# Patient Record
Sex: MALE | Race: WHITE | NOT HISPANIC OR LATINO | Employment: UNEMPLOYED
[De-identification: names, ages, dates, MRNs, and addresses within clinical notes are randomized per-mention and may not be internally consistent; named-entity substitution may affect disease eponyms.]

---

## 2020-02-16 ENCOUNTER — HEALTH MAINTENANCE LETTER (OUTPATIENT)
Age: 35
End: 2020-02-16

## 2021-03-04 ENCOUNTER — TRANSFERRED RECORDS (OUTPATIENT)
Dept: HEALTH INFORMATION MANAGEMENT | Facility: CLINIC | Age: 36
End: 2021-03-04

## 2021-03-05 ENCOUNTER — HOSPITAL ENCOUNTER (INPATIENT)
Facility: CLINIC | Age: 36
LOS: 4 days | Discharge: PSYCHIATRIC OR SUBSTANCE USE FACILITY | End: 2021-03-09
Attending: EMERGENCY MEDICINE | Admitting: PSYCHIATRY & NEUROLOGY
Payer: COMMERCIAL

## 2021-03-05 ENCOUNTER — TELEPHONE (OUTPATIENT)
Dept: BEHAVIORAL HEALTH | Facility: CLINIC | Age: 36
End: 2021-03-05

## 2021-03-05 DIAGNOSIS — Z11.52 ENCOUNTER FOR SCREENING LABORATORY TESTING FOR SEVERE ACUTE RESPIRATORY SYNDROME CORONAVIRUS 2 (SARS-COV-2): ICD-10-CM

## 2021-03-05 DIAGNOSIS — F10.930 ALCOHOL WITHDRAWAL SYNDROME WITHOUT COMPLICATION (H): ICD-10-CM

## 2021-03-05 LAB
ALBUMIN SERPL-MCNC: 4.5 G/DL (ref 3.4–5)
ALCOHOL BREATH TEST: 0 (ref 0–0.01)
ALP SERPL-CCNC: 88 U/L (ref 40–150)
ALT SERPL W P-5'-P-CCNC: 265 U/L (ref 0–70)
AMPHETAMINES UR QL SCN: NEGATIVE
ANION GAP SERPL CALCULATED.3IONS-SCNC: 6 MMOL/L (ref 3–14)
AST SERPL W P-5'-P-CCNC: 248 U/L (ref 0–45)
BARBITURATES UR QL: NEGATIVE
BASOPHILS # BLD AUTO: 0.1 10E9/L (ref 0–0.2)
BASOPHILS NFR BLD AUTO: 1.8 %
BENZODIAZ UR QL: POSITIVE
BILIRUB SERPL-MCNC: 0.9 MG/DL (ref 0.2–1.3)
BUN SERPL-MCNC: 24 MG/DL (ref 7–30)
CALCIUM SERPL-MCNC: 10.7 MG/DL (ref 8.5–10.1)
CANNABINOIDS UR QL SCN: POSITIVE
CHLORIDE SERPL-SCNC: 102 MMOL/L (ref 94–109)
CO2 SERPL-SCNC: 33 MMOL/L (ref 20–32)
COCAINE UR QL: NEGATIVE
CREAT SERPL-MCNC: 0.75 MG/DL (ref 0.66–1.25)
DIFFERENTIAL METHOD BLD: ABNORMAL
EOSINOPHIL # BLD AUTO: 0.1 10E9/L (ref 0–0.7)
EOSINOPHIL NFR BLD AUTO: 2.1 %
ERYTHROCYTE [DISTWIDTH] IN BLOOD BY AUTOMATED COUNT: 11.9 % (ref 10–15)
ETHANOL UR QL SCN: NEGATIVE
GFR SERPL CREATININE-BSD FRML MDRD: >90 ML/MIN/{1.73_M2}
GGT SERPL-CCNC: 365 U/L (ref 0–75)
GLUCOSE SERPL-MCNC: 97 MG/DL (ref 70–99)
HCT VFR BLD AUTO: 44 % (ref 40–53)
HGB BLD-MCNC: 15.4 G/DL (ref 13.3–17.7)
IMM GRANULOCYTES # BLD: 0 10E9/L (ref 0–0.4)
IMM GRANULOCYTES NFR BLD: 0.2 %
LABORATORY COMMENT REPORT: NORMAL
LYMPHOCYTES # BLD AUTO: 1.7 10E9/L (ref 0.8–5.3)
LYMPHOCYTES NFR BLD AUTO: 30.2 %
MCH RBC QN AUTO: 34.8 PG (ref 26.5–33)
MCHC RBC AUTO-ENTMCNC: 35 G/DL (ref 31.5–36.5)
MCV RBC AUTO: 100 FL (ref 78–100)
MONOCYTES # BLD AUTO: 0.5 10E9/L (ref 0–1.3)
MONOCYTES NFR BLD AUTO: 9.6 %
NEUTROPHILS # BLD AUTO: 3.1 10E9/L (ref 1.6–8.3)
NEUTROPHILS NFR BLD AUTO: 56.1 %
NRBC # BLD AUTO: 0 10*3/UL
NRBC BLD AUTO-RTO: 0 /100
OPIATES UR QL SCN: NEGATIVE
PLATELET # BLD AUTO: 166 10E9/L (ref 150–450)
POTASSIUM SERPL-SCNC: 3.5 MMOL/L (ref 3.4–5.3)
PROT SERPL-MCNC: 7.9 G/DL (ref 6.8–8.8)
RBC # BLD AUTO: 4.42 10E12/L (ref 4.4–5.9)
SARS-COV-2 RNA RESP QL NAA+PROBE: NEGATIVE
SODIUM SERPL-SCNC: 141 MMOL/L (ref 133–144)
SPECIMEN SOURCE: NORMAL
TSH SERPL DL<=0.005 MIU/L-ACNC: 2.09 MU/L (ref 0.4–4)
WBC # BLD AUTO: 5.6 10E9/L (ref 4–11)

## 2021-03-05 PROCEDURE — 82607 VITAMIN B-12: CPT | Performed by: EMERGENCY MEDICINE

## 2021-03-05 PROCEDURE — 84443 ASSAY THYROID STIM HORMONE: CPT | Performed by: EMERGENCY MEDICINE

## 2021-03-05 PROCEDURE — 128N000004 HC R&B CD ADULT

## 2021-03-05 PROCEDURE — 250N000011 HC RX IP 250 OP 636: Performed by: EMERGENCY MEDICINE

## 2021-03-05 PROCEDURE — 80053 COMPREHEN METABOLIC PANEL: CPT | Performed by: EMERGENCY MEDICINE

## 2021-03-05 PROCEDURE — 250N000013 HC RX MED GY IP 250 OP 250 PS 637: Performed by: EMERGENCY MEDICINE

## 2021-03-05 PROCEDURE — 99285 EMERGENCY DEPT VISIT HI MDM: CPT | Performed by: EMERGENCY MEDICINE

## 2021-03-05 PROCEDURE — 80320 DRUG SCREEN QUANTALCOHOLS: CPT | Performed by: EMERGENCY MEDICINE

## 2021-03-05 PROCEDURE — 82075 ASSAY OF BREATH ETHANOL: CPT | Performed by: EMERGENCY MEDICINE

## 2021-03-05 PROCEDURE — 85025 COMPLETE CBC W/AUTO DIFF WBC: CPT | Performed by: EMERGENCY MEDICINE

## 2021-03-05 PROCEDURE — 82977 ASSAY OF GGT: CPT | Performed by: EMERGENCY MEDICINE

## 2021-03-05 PROCEDURE — 99284 EMERGENCY DEPT VISIT MOD MDM: CPT | Performed by: EMERGENCY MEDICINE

## 2021-03-05 PROCEDURE — 250N000013 HC RX MED GY IP 250 OP 250 PS 637: Performed by: PSYCHIATRY & NEUROLOGY

## 2021-03-05 PROCEDURE — HZ2ZZZZ DETOXIFICATION SERVICES FOR SUBSTANCE ABUSE TREATMENT: ICD-10-PCS | Performed by: PSYCHIATRY & NEUROLOGY

## 2021-03-05 PROCEDURE — 80307 DRUG TEST PRSMV CHEM ANLYZR: CPT | Performed by: EMERGENCY MEDICINE

## 2021-03-05 PROCEDURE — 87635 SARS-COV-2 COVID-19 AMP PRB: CPT | Performed by: EMERGENCY MEDICINE

## 2021-03-05 PROCEDURE — 82306 VITAMIN D 25 HYDROXY: CPT | Performed by: EMERGENCY MEDICINE

## 2021-03-05 PROCEDURE — C9803 HOPD COVID-19 SPEC COLLECT: HCPCS | Performed by: EMERGENCY MEDICINE

## 2021-03-05 RX ORDER — CLONIDINE HYDROCHLORIDE 0.1 MG/1
0.1 TABLET ORAL 2 TIMES DAILY PRN
Status: DISCONTINUED | OUTPATIENT
Start: 2021-03-05 | End: 2021-03-09 | Stop reason: HOSPADM

## 2021-03-05 RX ORDER — FOLIC ACID 1 MG/1
1 TABLET ORAL DAILY
Status: DISCONTINUED | OUTPATIENT
Start: 2021-03-06 | End: 2021-03-05

## 2021-03-05 RX ORDER — MAGNESIUM HYDROXIDE/ALUMINUM HYDROXICE/SIMETHICONE 120; 1200; 1200 MG/30ML; MG/30ML; MG/30ML
30 SUSPENSION ORAL EVERY 4 HOURS PRN
Status: DISCONTINUED | OUTPATIENT
Start: 2021-03-05 | End: 2021-03-09 | Stop reason: HOSPADM

## 2021-03-05 RX ORDER — MULTIVITAMIN,THERAPEUTIC
1 TABLET ORAL DAILY
Status: ON HOLD | COMMUNITY
End: 2021-03-09

## 2021-03-05 RX ORDER — MULTIPLE VITAMINS W/ MINERALS TAB 9MG-400MCG
1 TAB ORAL DAILY
Status: DISCONTINUED | OUTPATIENT
Start: 2021-03-05 | End: 2021-03-09 | Stop reason: HOSPADM

## 2021-03-05 RX ORDER — ONDANSETRON 4 MG/1
4 TABLET, ORALLY DISINTEGRATING ORAL ONCE
Status: COMPLETED | OUTPATIENT
Start: 2021-03-05 | End: 2021-03-05

## 2021-03-05 RX ORDER — TRAZODONE HYDROCHLORIDE 50 MG/1
50 TABLET, FILM COATED ORAL
Status: DISCONTINUED | OUTPATIENT
Start: 2021-03-05 | End: 2021-03-09

## 2021-03-05 RX ORDER — ONDANSETRON 4 MG/1
4 TABLET, ORALLY DISINTEGRATING ORAL EVERY 6 HOURS PRN
Status: DISCONTINUED | OUTPATIENT
Start: 2021-03-05 | End: 2021-03-09 | Stop reason: HOSPADM

## 2021-03-05 RX ORDER — ATENOLOL 50 MG/1
50 TABLET ORAL DAILY PRN
Status: DISCONTINUED | OUTPATIENT
Start: 2021-03-05 | End: 2021-03-09 | Stop reason: HOSPADM

## 2021-03-05 RX ORDER — AMOXICILLIN 250 MG
1 CAPSULE ORAL 2 TIMES DAILY PRN
Status: DISCONTINUED | OUTPATIENT
Start: 2021-03-05 | End: 2021-03-09 | Stop reason: HOSPADM

## 2021-03-05 RX ORDER — LANOLIN ALCOHOL/MO/W.PET/CERES
100 CREAM (GRAM) TOPICAL DAILY
Status: DISCONTINUED | OUTPATIENT
Start: 2021-03-05 | End: 2021-03-09 | Stop reason: HOSPADM

## 2021-03-05 RX ORDER — DIAZEPAM 5 MG
5-20 TABLET ORAL EVERY 30 MIN PRN
Status: DISCONTINUED | OUTPATIENT
Start: 2021-03-05 | End: 2021-03-05 | Stop reason: CLARIF

## 2021-03-05 RX ORDER — FOLIC ACID 1 MG/1
1 TABLET ORAL DAILY
Status: DISCONTINUED | OUTPATIENT
Start: 2021-03-05 | End: 2021-03-09 | Stop reason: HOSPADM

## 2021-03-05 RX ORDER — OLANZAPINE 10 MG/1
10 TABLET ORAL 3 TIMES DAILY PRN
Status: DISCONTINUED | OUTPATIENT
Start: 2021-03-05 | End: 2021-03-09 | Stop reason: HOSPADM

## 2021-03-05 RX ORDER — HYDROXYZINE HYDROCHLORIDE 25 MG/1
25 TABLET, FILM COATED ORAL EVERY 4 HOURS PRN
Status: DISCONTINUED | OUTPATIENT
Start: 2021-03-05 | End: 2021-03-09 | Stop reason: HOSPADM

## 2021-03-05 RX ORDER — ACETAMINOPHEN 325 MG/1
650 TABLET ORAL EVERY 4 HOURS PRN
Status: DISCONTINUED | OUTPATIENT
Start: 2021-03-05 | End: 2021-03-09 | Stop reason: HOSPADM

## 2021-03-05 RX ORDER — NICOTINE 21 MG/24HR
1 PATCH, TRANSDERMAL 24 HOURS TRANSDERMAL DAILY
Status: DISCONTINUED | OUTPATIENT
Start: 2021-03-05 | End: 2021-03-09 | Stop reason: HOSPADM

## 2021-03-05 RX ORDER — LANOLIN ALCOHOL/MO/W.PET/CERES
100 CREAM (GRAM) TOPICAL DAILY
Status: DISCONTINUED | OUTPATIENT
Start: 2021-03-06 | End: 2021-03-05

## 2021-03-05 RX ORDER — LORAZEPAM 1 MG/1
1-4 TABLET ORAL EVERY 30 MIN PRN
Status: DISCONTINUED | OUTPATIENT
Start: 2021-03-05 | End: 2021-03-06

## 2021-03-05 RX ORDER — METHADONE HYDROCHLORIDE 5 MG/5ML
55 SOLUTION ORAL DAILY
COMMUNITY

## 2021-03-05 RX ORDER — IBUPROFEN 200 MG
800 TABLET ORAL EVERY 8 HOURS PRN
Status: ON HOLD | COMMUNITY
End: 2021-03-09

## 2021-03-05 RX ORDER — MULTIPLE VITAMINS W/ MINERALS TAB 9MG-400MCG
1 TAB ORAL DAILY
Status: DISCONTINUED | OUTPATIENT
Start: 2021-03-06 | End: 2021-03-05

## 2021-03-05 RX ORDER — OLANZAPINE 10 MG/2ML
10 INJECTION, POWDER, FOR SOLUTION INTRAMUSCULAR 3 TIMES DAILY PRN
Status: DISCONTINUED | OUTPATIENT
Start: 2021-03-05 | End: 2021-03-09 | Stop reason: HOSPADM

## 2021-03-05 RX ORDER — DIAZEPAM 10 MG
10 TABLET ORAL ONCE
Status: COMPLETED | OUTPATIENT
Start: 2021-03-05 | End: 2021-03-05

## 2021-03-05 RX ADMIN — NICOTINE POLACRILEX 4 MG: 4 GUM, CHEWING BUCCAL at 15:30

## 2021-03-05 RX ADMIN — DIAZEPAM 10 MG: 10 TABLET ORAL at 17:59

## 2021-03-05 RX ADMIN — DIAZEPAM 10 MG: 5 TABLET ORAL at 20:17

## 2021-03-05 RX ADMIN — NICOTINE POLACRILEX 4 MG: 4 GUM, CHEWING BUCCAL at 20:14

## 2021-03-05 RX ADMIN — MULTIPLE VITAMINS W/ MINERALS TAB 1 TABLET: TAB at 15:32

## 2021-03-05 RX ADMIN — NICOTINE 1 PATCH: 21 PATCH, EXTENDED RELEASE TRANSDERMAL at 20:13

## 2021-03-05 RX ADMIN — HYDROXYZINE HYDROCHLORIDE 25 MG: 25 TABLET, FILM COATED ORAL at 22:15

## 2021-03-05 RX ADMIN — THIAMINE HCL TAB 100 MG 100 MG: 100 TAB at 15:33

## 2021-03-05 RX ADMIN — FOLIC ACID 1 MG: 1 TABLET ORAL at 15:33

## 2021-03-05 RX ADMIN — LORAZEPAM 2 MG: 1 TABLET ORAL at 22:15

## 2021-03-05 RX ADMIN — ONDANSETRON 4 MG: 4 TABLET, ORALLY DISINTEGRATING ORAL at 15:30

## 2021-03-05 RX ADMIN — TRAZODONE HYDROCHLORIDE 50 MG: 50 TABLET ORAL at 22:15

## 2021-03-05 ASSESSMENT — ENCOUNTER SYMPTOMS
TREMORS: 1
ABDOMINAL PAIN: 0
SHORTNESS OF BREATH: 0
HEADACHES: 1
FEVER: 0
NAUSEA: 1

## 2021-03-05 ASSESSMENT — ACTIVITIES OF DAILY LIVING (ADL): ADL_ASSESSMENT: WDL
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
## 2021-03-05 NOTE — ED TRIAGE NOTES
Pt states that he went to treatment a few months ago but wasn't given medical help for his withdrawal sx. Pt left that program and tried to manage his drinking on his own. Pt says he drinks a 1.75 of whiskey a day. Last drink was Wednesday at 2100.

## 2021-03-05 NOTE — TELEPHONE ENCOUNTER
Patient cleared and ready for behavioral bed placement: Yes   S:  Call received from MD in [place] ED requesting inpatient detox admission.    B:  35 year old male with a PMH notable for polysubstance abuse, anxiety, depression, BPD, PTSD who presents for alcohol detox. Patient reports that he has been drinking a gallon of whisky daily for 5 months. He reports that he went to treatment in [place] a month ago, but was not medically detoxed prior to being admitted. He states that they would not help him medically detox so he called his friend for a ride and left. He began drinking again immediately. His last drink was 9:30 pm Wednesday. He had marijuana this morning, but denies other drugs. He is now tremulous, nauseous, and has a headache and some visual disturbances. He has a remote history of withdrawal seizure in the past. He states that he has a Rule 25 set up to go to [facility] for treatment after detox. He denies SI and SIB.  Labs pending.  A: Voluntary

## 2021-03-06 LAB — VIT B12 SERPL-MCNC: 1110 PG/ML (ref 193–986)

## 2021-03-06 PROCEDURE — 99221 1ST HOSP IP/OBS SF/LOW 40: CPT | Performed by: PHYSICIAN ASSISTANT

## 2021-03-06 PROCEDURE — H2035 A/D TX PROGRAM, PER HOUR: HCPCS | Mod: HQ

## 2021-03-06 PROCEDURE — 99207 PR CONSULT E&M CHANGED TO INITIAL LEVEL: CPT | Performed by: PHYSICIAN ASSISTANT

## 2021-03-06 PROCEDURE — 250N000013 HC RX MED GY IP 250 OP 250 PS 637: Performed by: EMERGENCY MEDICINE

## 2021-03-06 PROCEDURE — 99223 1ST HOSP IP/OBS HIGH 75: CPT | Mod: AI | Performed by: PSYCHIATRY & NEUROLOGY

## 2021-03-06 PROCEDURE — 128N000004 HC R&B CD ADULT

## 2021-03-06 PROCEDURE — 250N000013 HC RX MED GY IP 250 OP 250 PS 637: Performed by: PSYCHIATRY & NEUROLOGY

## 2021-03-06 RX ORDER — NALOXONE HYDROCHLORIDE 0.4 MG/ML
0.4 INJECTION, SOLUTION INTRAMUSCULAR; INTRAVENOUS; SUBCUTANEOUS
Status: DISCONTINUED | OUTPATIENT
Start: 2021-03-06 | End: 2021-03-09 | Stop reason: HOSPADM

## 2021-03-06 RX ORDER — METHADONE HYDROCHLORIDE 5 MG/5ML
110 SOLUTION ORAL DAILY
Status: DISCONTINUED | OUTPATIENT
Start: 2021-03-06 | End: 2021-03-09 | Stop reason: HOSPADM

## 2021-03-06 RX ORDER — NALOXONE HYDROCHLORIDE 0.4 MG/ML
0.2 INJECTION, SOLUTION INTRAMUSCULAR; INTRAVENOUS; SUBCUTANEOUS
Status: DISCONTINUED | OUTPATIENT
Start: 2021-03-06 | End: 2021-03-09 | Stop reason: HOSPADM

## 2021-03-06 RX ORDER — DIAZEPAM 5 MG
5-20 TABLET ORAL EVERY 30 MIN PRN
Status: DISCONTINUED | OUTPATIENT
Start: 2021-03-06 | End: 2021-03-09 | Stop reason: HOSPADM

## 2021-03-06 RX ADMIN — MULTIPLE VITAMINS W/ MINERALS TAB 1 TABLET: TAB at 08:53

## 2021-03-06 RX ADMIN — DIAZEPAM 5 MG: 5 TABLET ORAL at 16:19

## 2021-03-06 RX ADMIN — NICOTINE 1 PATCH: 21 PATCH, EXTENDED RELEASE TRANSDERMAL at 08:53

## 2021-03-06 RX ADMIN — NICOTINE POLACRILEX 4 MG: 4 GUM, CHEWING BUCCAL at 18:25

## 2021-03-06 RX ADMIN — FOLIC ACID 1 MG: 1 TABLET ORAL at 08:53

## 2021-03-06 RX ADMIN — NICOTINE POLACRILEX 4 MG: 4 GUM, CHEWING BUCCAL at 12:49

## 2021-03-06 RX ADMIN — CLONIDINE HYDROCHLORIDE 0.1 MG: 0.1 TABLET ORAL at 09:43

## 2021-03-06 RX ADMIN — LORAZEPAM 1 MG: 1 TABLET ORAL at 08:53

## 2021-03-06 RX ADMIN — HYDROXYZINE HYDROCHLORIDE 25 MG: 25 TABLET, FILM COATED ORAL at 20:47

## 2021-03-06 RX ADMIN — NICOTINE POLACRILEX 4 MG: 4 GUM, CHEWING BUCCAL at 08:53

## 2021-03-06 RX ADMIN — NICOTINE POLACRILEX 4 MG: 4 GUM, CHEWING BUCCAL at 20:47

## 2021-03-06 RX ADMIN — OLANZAPINE 10 MG: 10 TABLET, FILM COATED ORAL at 21:55

## 2021-03-06 RX ADMIN — METHADONE HYDROCHLORIDE 110 MG: 5 SOLUTION ORAL at 09:42

## 2021-03-06 RX ADMIN — NICOTINE POLACRILEX 4 MG: 4 GUM, CHEWING BUCCAL at 16:19

## 2021-03-06 RX ADMIN — LORAZEPAM 2 MG: 1 TABLET ORAL at 00:53

## 2021-03-06 RX ADMIN — TRAZODONE HYDROCHLORIDE 50 MG: 50 TABLET ORAL at 21:55

## 2021-03-06 RX ADMIN — LORAZEPAM 2 MG: 1 TABLET ORAL at 05:13

## 2021-03-06 RX ADMIN — THIAMINE HCL TAB 100 MG 100 MG: 100 TAB at 08:53

## 2021-03-06 ASSESSMENT — ACTIVITIES OF DAILY LIVING (ADL)
DRESS: STREET CLOTHES
ORAL_HYGIENE: INDEPENDENT
LAUNDRY: WITH SUPERVISION
DRESS: SCRUBS (BEHAVIORAL HEALTH);INDEPENDENT
HYGIENE/GROOMING: INDEPENDENT
ORAL_HYGIENE: INDEPENDENT
LAUNDRY: WITH SUPERVISION
HYGIENE/GROOMING: INDEPENDENT

## 2021-03-06 NOTE — PLAN OF CARE
Pt MSSA score today is an 8, 2 mg ativan administered. Pt denies suicidal ideation plans or intent. Endorses anxiety and depression both rated 7 of 10. Endorses feeling tense and irritable. States was just in [facility] in [place] but was brought there but he was under the impression he was going to tx in [place] and left [place] and continued to drink. States he is interested in getting into [facility] treatment asap. Will continue to monitor and intervene as warranted.
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
## 2021-03-06 NOTE — PROGRESS NOTES
03/05/21 1928   Patient Belongings   Did you bring any home meds/supplements to the hospital?  No   Patient Belongings other (see comments)  (storage bin, medroom bin, security)   Patient Belongings Put in Hospital Secure Location (Security or Locker, etc.) other (see comments)   Belongings Search Yes   Clothing Search Yes   Second Staff Samir Francisco   Storage bin: black tote bag, shoes, gloves, cap, 4 lighters, 3 cigarettes, disposable razor, manicure set  Medroom bin: cell phone, wallet  Security: Med from home sent in envelope                   Env # [id number]: $100 cash, 2 visa, one mastercard **ENVELOPE BROUGHT UP FROM SECURITY, ALL CARDS REMOVED**    Env # [id number]: $100 cash  A               Admission:  I am responsible for any personal items that are not sent to the safe or pharmacy.  [facility] is not responsible for loss, theft or damage of any property in my possession.    Signature:  _________________________________ Date: _______  Time: _____                                              Staff Signature:  ____________________________ Date: ________  Time: _____      2nd Staff person, if patient is unable/unwilling to sign:    Signature: ________________________________ Date: ________  Time: _____     Discharge:  [facility] has returned all of my personal belongings:    Signature: _________________________________ Date: ________  Time: _____                                          Staff Signature:  ____________________________ Date: ________  Time: _____

## 2021-03-06 NOTE — PROVIDER NOTIFICATION
"Spoke with [facility] at [facility] to verify his methadone dose. Per RN Tawana at [facility] he was last at [facility] on 3/1/2021 and was given 6 take home doses. RN Tawana reports methadone dose as 110 mg daily. Per security belonging envelope pt has brought into the hospital methadone 110 mg x 3 doses. Pt reports he took his last dose of methadone Thursday 3/5/21. Per BOBBY Gilliam he is next due in the clinic Monday 3/8/2021. Explained to him he has 1 extra dose based on the number of take homes \"I must have missed one in there, it's been a rough week\". Reports having been on methadone \"since 2015\". Updated Dr. Ayala and Pharmacist Mahesh about this information.    Addendum: TORMARYLIN received from Dr. Ayala to order 110mg methadone daily.  " Telephone Encounter by Robin Rutledge RN at 02/27/17 09:45 AM     Author:  Robin Rutledge RN Service:  (none) Author Type:  Registered Nurse     Filed:  02/27/17 09:52 AM Encounter Date:  2/27/2017 Status:  Signed     :  Robin Rutledge RN (Registered Nurse)            Patient is complaining of UTI symptoms as follows -     How long has patient had symptoms?[JG1.1T] About a week[JG1.1M]  History of UTI's?[JG1.1T] She has them in the past but none recently[JG1.1M]   Urgency/frequency?[JG1.1T] both[JG1.1M]  Vaginal Discharge?[JG1.1T] no[JG1.1M]  Odor to Urine?[JG1.1T] Little bit[JG1.1M]  Burning/stinging?[JG1.1T] Yes[JG1.1M]   Blood in urine?[JG1.1T] Just a little streak of bright red, menstrual cycle is happening now but does not believe the blood is from this[JG1.1M]  Abdominal pain/pressure?[JG1.1T] Bladder spasms - after/during she is urinating[JG1.1M]  Lower back/flank pain?[JG1.1T] no[JG1.1M]  Able to empty bladder?[JG1.1T] Not emptying completely[JG1.1M]  Fever/chills?[JG1.1T] No[JG1.1M]   Taken anything OTC?[JG1.1T] No, drinking extra fluids.    UTI screen ordered for patient. Will route to stats for results.[JG1.1M] Essie verbalized understanding of information given.[JG1.1T]         Revision History        User Key Date/Time User Provider Type Action    > JG1.1 02/27/17 09:52 AM Robin Rutledge RN Registered Nurse Sign    M - Manual, T - Template
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
## 2021-03-06 NOTE — ED NOTES
Pt appears anxious, stated that he usually takes his anti-anxiety medications at home during this time. Pt was given diazepam for anxiety.
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
## 2021-03-06 NOTE — PHARMACY-ADMISSION MEDICATION HISTORY
Admission Medication History Completed by Pharmacy    See [facility] Admission Navigator for allergy information, preferred outpatient pharmacy, prior to admission medications and immunization status.     Medication History Sources:     Patient    Changes made to PTA medication list (reason):    Added:   o Methadone (per patient)  o Multivitamin (per patient)  o Tiger Balm (per patient)    Deleted:  o Acetaminophen 325 mg tablet - take 325-650 mg by mouth ever 6 hours as needed (per patient)  o Clonazepam 1 mg tablet - take 1 mg by mouth 2 times daily (per patient)  o Clotrimazole-betamethasone lotion - apply topically 2 times daily (per patient)  o Gabapentin 300 mg capsule - take 2 capsules by mouth 3 times daily (per patient)  o Losartan 50 mg tablet - take 50 mg by mouth daily (per patient)  o Prazosin 5 mg capsule - take 15 mg by mouth at bedtime (per patient)  o Viibryd 20 mg tablet - take 20 mg by mouth daily (per patient)    Changed: None    Additional Information:    Patient reported taking methadone 110 mg by mouth daily and he last took it 3/5/2021. He has it filled at [facility] in [place] ([phone]). We called and left a callback number to verify the dose.     Patient reported taking an antihistamine last night to help him sleep that he takes occasionally as needed but he did not remember the name or strength of it.    Patient reported taking ativan 1 mg tablets - 2 mg by mouth three times a day for the past few days that he got from a friend.    Patient denied the use of any other prescription or over the counter medications.     Prior to Admission medications    Medication Sig Last Dose Taking? Auth Provider   Camphor-Menthol (TIGER BALM EX) Externally apply topically as needed Past Week Yes Reported, Patient   ibuprofen (ADVIL/MOTRIN) 200 MG tablet Take 800 mg by mouth every 8 hours as needed for mild pain Past Week Yes Reported, Patient   methadone (DOLPHINE) 5 MG/5ML solution Take 110 mg by mouth  daily 3/5/2021 at AM Yes Reported, Patient   multivitamin, therapeutic (THERA-VIT) TABS tablet Take 1 tablet by mouth daily Past Week Yes Reported, Patient       Date completed: 03/05/21    Medication history completed by: Miguel Rico

## 2021-03-06 NOTE — PROGRESS NOTES
MSSA scores of 8/8, pt receives 4mg of ativan this shift. Bp elevated@ 158/102but no meeting parameters for prn clonidine. Pt s offering no complaints and is observed to sleep throughout the noc.

## 2021-03-07 LAB
ALBUMIN SERPL-MCNC: 3.4 G/DL (ref 3.4–5)
ALP SERPL-CCNC: 70 U/L (ref 40–150)
ALT SERPL W P-5'-P-CCNC: 233 U/L (ref 0–70)
AST SERPL W P-5'-P-CCNC: 187 U/L (ref 0–45)
BILIRUB DIRECT SERPL-MCNC: 0.2 MG/DL (ref 0–0.2)
BILIRUB SERPL-MCNC: 0.6 MG/DL (ref 0.2–1.3)
PROT SERPL-MCNC: 6.5 G/DL (ref 6.8–8.8)

## 2021-03-07 PROCEDURE — 128N000004 HC R&B CD ADULT

## 2021-03-07 PROCEDURE — 36415 COLL VENOUS BLD VENIPUNCTURE: CPT | Performed by: PHYSICIAN ASSISTANT

## 2021-03-07 PROCEDURE — 250N000013 HC RX MED GY IP 250 OP 250 PS 637: Performed by: PSYCHIATRY & NEUROLOGY

## 2021-03-07 PROCEDURE — 250N000013 HC RX MED GY IP 250 OP 250 PS 637: Performed by: EMERGENCY MEDICINE

## 2021-03-07 PROCEDURE — 80076 HEPATIC FUNCTION PANEL: CPT | Performed by: PHYSICIAN ASSISTANT

## 2021-03-07 RX ORDER — LISINOPRIL 10 MG/1
10 TABLET ORAL DAILY
Status: DISCONTINUED | OUTPATIENT
Start: 2021-03-07 | End: 2021-03-09 | Stop reason: HOSPADM

## 2021-03-07 RX ADMIN — NICOTINE POLACRILEX 4 MG: 4 GUM, CHEWING BUCCAL at 16:45

## 2021-03-07 RX ADMIN — NICOTINE 1 PATCH: 21 PATCH, EXTENDED RELEASE TRANSDERMAL at 18:29

## 2021-03-07 RX ADMIN — FOLIC ACID 1 MG: 1 TABLET ORAL at 08:51

## 2021-03-07 RX ADMIN — NICOTINE 1 PATCH: 21 PATCH, EXTENDED RELEASE TRANSDERMAL at 08:51

## 2021-03-07 RX ADMIN — CLONIDINE HYDROCHLORIDE 0.1 MG: 0.1 TABLET ORAL at 00:02

## 2021-03-07 RX ADMIN — TRAZODONE HYDROCHLORIDE 50 MG: 50 TABLET ORAL at 22:07

## 2021-03-07 RX ADMIN — TRAZODONE HYDROCHLORIDE 50 MG: 50 TABLET ORAL at 00:02

## 2021-03-07 RX ADMIN — CLONIDINE HYDROCHLORIDE 0.1 MG: 0.1 TABLET ORAL at 08:50

## 2021-03-07 RX ADMIN — NICOTINE POLACRILEX 4 MG: 4 GUM, CHEWING BUCCAL at 12:55

## 2021-03-07 RX ADMIN — NICOTINE POLACRILEX 4 MG: 4 GUM, CHEWING BUCCAL at 08:50

## 2021-03-07 RX ADMIN — OLANZAPINE 10 MG: 10 TABLET, FILM COATED ORAL at 22:07

## 2021-03-07 RX ADMIN — MULTIPLE VITAMINS W/ MINERALS TAB 1 TABLET: TAB at 08:51

## 2021-03-07 RX ADMIN — NICOTINE POLACRILEX 4 MG: 4 GUM, CHEWING BUCCAL at 18:24

## 2021-03-07 RX ADMIN — ACETAMINOPHEN 650 MG: 325 TABLET, FILM COATED ORAL at 20:24

## 2021-03-07 RX ADMIN — NICOTINE POLACRILEX 4 MG: 4 GUM, CHEWING BUCCAL at 10:25

## 2021-03-07 RX ADMIN — HYDROXYZINE HYDROCHLORIDE 25 MG: 25 TABLET, FILM COATED ORAL at 10:25

## 2021-03-07 RX ADMIN — METHADONE HYDROCHLORIDE 110 MG: 5 SOLUTION ORAL at 08:50

## 2021-03-07 RX ADMIN — THIAMINE HCL TAB 100 MG 100 MG: 100 TAB at 08:50

## 2021-03-07 RX ADMIN — HYDROXYZINE HYDROCHLORIDE 25 MG: 25 TABLET, FILM COATED ORAL at 16:45

## 2021-03-07 ASSESSMENT — ACTIVITIES OF DAILY LIVING (ADL)
HYGIENE/GROOMING: INDEPENDENT
LAUNDRY: WITH SUPERVISION
LAUNDRY: WITH SUPERVISION
HYGIENE/GROOMING: INDEPENDENT
DRESS: STREET CLOTHES
ORAL_HYGIENE: INDEPENDENT
ORAL_HYGIENE: INDEPENDENT
DRESS: INDEPENDENT;SCRUBS (BEHAVIORAL HEALTH)

## 2021-03-07 NOTE — PLAN OF CARE
"S:  Brendan has been visible in the milieu.  He has participated in Art group.  He is eating and drinking normally.  He denies any thoughts of self harm, suicide or thoughts of harming others. He rated his anxiety at a \"10\" and his depression at an \"8\"  He stated that his mood was \"miserable\".    He had an elevated BP reading at 16:00, that improved at the 20:00 check: 140/108 and 152/98 respectively.  He walked around the unit wearing his neck pillow (an order was obtained for this last night).  He was mildly agitated at the end of the night.  B: Pt admitted for alcohol withdrawal and detoxification.  A:  Pt in moderate to minimal alcohol withdrawal AEB MSSA scores of 8 & 5.  R:  Administered diazepam 5 mg once, hydroxyzine 25 mg and one piece of nicotine gum.  For a sleep aide he was given olanzapine 10 mg and trazodone 50 mg.  Continue to monitor and medicate as ordered and indicated.   "

## 2021-03-07 NOTE — PROGRESS NOTES
MSSA scores of 5/5 pt requires no valium this shift. Recieves trazadone and clonidine for HTN with good results and trazadone for sleep, pt is observed to sleep throughout the noc.   N/A

## 2021-03-07 NOTE — PROGRESS NOTES
Brief Medicine Progress Note    Internal Medicine following peripherally for monitoring of patient's transaminitis and alcohol withdrawal.  Patient's liver enzymes elevated on admission with , .  Repeat hepatic panel today with improved function.  , .  Elevation likely secondary to ongoing alcohol use.      Patient also with elevated blood pressures today, highest 179/133.  Elevation secondary to alcohol withdrawal.     -Add lisinopril 10mg daily, hold for SBP <110.   -Continue to use clonidine 0.1mg BID PRN per withdrawal   -Repeat hepatic panel in one week.    -Patient to follow up with PCP on discharge.     Internal medicine will sign off.  Please call or page with any questions or concerns.     Silvia Duarte PA-C  Hospitalist Service  [facility], [place]  Pager: [phone]

## 2021-03-07 NOTE — PROGRESS NOTES
03/06/2021   Groups   Details    (Psychotherapy)   Number of patients attending the group:  9  Group Length:  1 Hours     Group Therapy Type: Psychotherapy     Summary of Group / Topics Discussed:      The  Psychotherapy group goal is to promote insight to positive choice and change. Group processing is within a supportive and safe environment. Patients will process emotions using verbal group and expressive psychotherapy interventions including visual art/writing interventions.     Group interventions support patients by: Support / process group step 1     Subjective -patient report of mood today-   anxious     Group Response- 9 members engaged.       Patient Response-Pt spoke about there being more to an addict than addiction, people are complex.   He was pleasant, cooperative and engaged.     Jonny Collazo, BONIFACIO, ATR-BC

## 2021-03-08 LAB — DEPRECATED CALCIDIOL+CALCIFEROL SERPL-MC: 15 UG/L (ref 20–75)

## 2021-03-08 PROCEDURE — 250N000013 HC RX MED GY IP 250 OP 250 PS 637: Performed by: EMERGENCY MEDICINE

## 2021-03-08 PROCEDURE — H2035 A/D TX PROGRAM, PER HOUR: HCPCS | Mod: HQ

## 2021-03-08 PROCEDURE — 250N000013 HC RX MED GY IP 250 OP 250 PS 637: Performed by: PSYCHIATRY & NEUROLOGY

## 2021-03-08 PROCEDURE — 99232 SBSQ HOSP IP/OBS MODERATE 35: CPT | Performed by: PSYCHIATRY & NEUROLOGY

## 2021-03-08 PROCEDURE — 128N000004 HC R&B CD ADULT

## 2021-03-08 RX ORDER — OLANZAPINE 2.5 MG/1
2.5 TABLET, FILM COATED ORAL 2 TIMES DAILY
Status: DISCONTINUED | OUTPATIENT
Start: 2021-03-08 | End: 2021-03-09 | Stop reason: HOSPADM

## 2021-03-08 RX ORDER — OLANZAPINE 2.5 MG/1
2.5 TABLET, FILM COATED ORAL 2 TIMES DAILY
Status: DISCONTINUED | OUTPATIENT
Start: 2021-03-08 | End: 2021-03-08

## 2021-03-08 RX ADMIN — CLONIDINE HYDROCHLORIDE 0.1 MG: 0.1 TABLET ORAL at 08:31

## 2021-03-08 RX ADMIN — MULTIPLE VITAMINS W/ MINERALS TAB 1 TABLET: TAB at 08:31

## 2021-03-08 RX ADMIN — METHADONE HYDROCHLORIDE 110 MG: 5 SOLUTION ORAL at 08:31

## 2021-03-08 RX ADMIN — ATENOLOL 50 MG: 50 TABLET ORAL at 18:24

## 2021-03-08 RX ADMIN — THIAMINE HCL TAB 100 MG 100 MG: 100 TAB at 08:31

## 2021-03-08 RX ADMIN — NICOTINE POLACRILEX 8 MG: 4 GUM, CHEWING BUCCAL at 18:25

## 2021-03-08 RX ADMIN — NICOTINE POLACRILEX 4 MG: 4 GUM, CHEWING BUCCAL at 12:24

## 2021-03-08 RX ADMIN — OLANZAPINE 2.5 MG: 2.5 TABLET ORAL at 20:24

## 2021-03-08 RX ADMIN — HYDROXYZINE HYDROCHLORIDE 25 MG: 25 TABLET, FILM COATED ORAL at 20:24

## 2021-03-08 RX ADMIN — HYDROXYZINE HYDROCHLORIDE 25 MG: 25 TABLET, FILM COATED ORAL at 16:18

## 2021-03-08 RX ADMIN — HYDROXYZINE HYDROCHLORIDE 25 MG: 25 TABLET, FILM COATED ORAL at 01:15

## 2021-03-08 RX ADMIN — TRAZODONE HYDROCHLORIDE 50 MG: 50 TABLET ORAL at 22:34

## 2021-03-08 RX ADMIN — NICOTINE 1 PATCH: 21 PATCH, EXTENDED RELEASE TRANSDERMAL at 08:33

## 2021-03-08 RX ADMIN — NICOTINE POLACRILEX 4 MG: 4 GUM, CHEWING BUCCAL at 08:31

## 2021-03-08 RX ADMIN — TRAZODONE HYDROCHLORIDE 50 MG: 50 TABLET ORAL at 01:15

## 2021-03-08 RX ADMIN — HYDROXYZINE HYDROCHLORIDE 25 MG: 25 TABLET, FILM COATED ORAL at 12:24

## 2021-03-08 RX ADMIN — DOCUSATE SODIUM 50MG AND SENNOSIDES 8.6MG 1 TABLET: 8.6; 5 TABLET, FILM COATED ORAL at 16:18

## 2021-03-08 RX ADMIN — NICOTINE POLACRILEX 8 MG: 4 GUM, CHEWING BUCCAL at 22:34

## 2021-03-08 RX ADMIN — HYDROXYZINE HYDROCHLORIDE 25 MG: 25 TABLET, FILM COATED ORAL at 08:31

## 2021-03-08 RX ADMIN — TRAZODONE HYDROCHLORIDE 50 MG: 50 TABLET ORAL at 22:37

## 2021-03-08 RX ADMIN — OLANZAPINE 2.5 MG: 2.5 TABLET ORAL at 15:29

## 2021-03-08 RX ADMIN — NICOTINE POLACRILEX 4 MG: 4 GUM, CHEWING BUCCAL at 13:16

## 2021-03-08 RX ADMIN — NICOTINE POLACRILEX 8 MG: 4 GUM, CHEWING BUCCAL at 20:26

## 2021-03-08 RX ADMIN — FOLIC ACID 1 MG: 1 TABLET ORAL at 08:31

## 2021-03-08 ASSESSMENT — ACTIVITIES OF DAILY LIVING (ADL)
LAUNDRY: WITH SUPERVISION
ORAL_HYGIENE: INDEPENDENT
LAUNDRY: WITH SUPERVISION
DRESS: INDEPENDENT
HYGIENE/GROOMING: INDEPENDENT
ORAL_HYGIENE: INDEPENDENT
DRESS: SCRUBS (BEHAVIORAL HEALTH)
HYGIENE/GROOMING: INDEPENDENT

## 2021-03-08 NOTE — PROGRESS NOTES
Pt receives trazadone and vistaril prn as he has each noc of the last several,early in the shift and is observed to sleep throughout the noc.

## 2021-03-08 NOTE — PLAN OF CARE
"Patient has not required any valium for alcohol detox since 03/06/21 1621. All MSSA scores since that time have been less than 8. Pt is now removed from alcohol detox monitoring status. Pt is highly anxious today about discharge and getting into contact with [facility] for treatment (attempts made over the weekend to contact them to no avail). States he \"really, really want a cigarette\". Pt denies suicidal ideation plans or intent. Endorses anxiety and depression both rated 10 of 10 in severity. States wants to discharge today \"no matter what\". Encouraged him to be patient and allow our team time to solidify his discharge plan. States he is also considering [facility] as 2nd option. /126, prn clonidine administered. Pt again declines lisinopril stating he does not want to start this medication. Will recheck vitals in 1 hour. Plan-continue to assess and intervene as warranted.    Addendum: BP recheck 141/96.      BP (!) 141/96   Pulse 57   Temp 97.6  F (36.4  C) (Temporal)   Resp 16   SpO2 96%        BP (!) 164/126   Pulse 76   Temp 97.9  F (36.6  C) (Temporal)   Resp 16   SpO2 96%       "

## 2021-03-08 NOTE — PLAN OF CARE
Behavioral Team Discussion: (3/8/2021)    Continued Stay Criteria/Rationale: Patient admitted for alcohol withdrawal, complicated.  Plan: The following services will be provided to the patient; psychiatric assessment, medication management, therapeutic milieu, individual and group support, and skills groups.   Participants: 3A Provider: Dr. Tomi MD; 3A RN's: Efren Sabillon, RN; 3A CM's: Charlotte Acuna [facility], Leslye Reyna MA [facility] and Milady Perkins [facility]   Summary/Recommendation: Providers will assess today for treatment recommendations, discharge planning, and aftercare plans. CM will meet with pt for discharge planning.   Medical/Physical: Internal medicine consult completed 3/6/2021.  Precautions:   Behavioral Orders   Procedures     Code 1 - Restrict to Unit     Routine Programming     As clinically indicated     Seizure precautions     Status 15     Every 15 minutes.     Withdrawal precautions     Rationale for change in precautions or plan: N/A  Progress: Improving.

## 2021-03-08 NOTE — PROGRESS NOTES
"Met with pt to discuss aftercare plans. Pt states that he was referred to [facility] and [facility]. Pt states he is anxious and frustrated, states he feels as though his needs were ignored all weekend long. Pt is frustrated that phone call was not placed to [facility] when admitted for case management despite being admitted over the weekend. Placed call to [facility] who confirms pt is on the wait list and there is no current availability, reports there are \"a few people\" in front of pt on wait list so did not give specific date for availability. Pt appeared frustrated with this answer. Placed call to [facility] ([facility]) in [facility] who reports immediate availability however they have not yet received his assessment from [facility]. Spoke with intake who will fax ISABEL to [facility]. Will have pt sign ISABEL then fax back to [facility] so they can request copy of assessment from [facility]. Pt appeared to de-escalate once it became obvious that writer was assisting with discharge plans. Pt states he is craving a cigarette and feeling frustrated about not being able to smoke. Pt was encouraged to remain hospitalized until admission date is secured at a treatment facility, [facility] or [facility]. Pt agreeable and thanked writer for assistance. Pt has Mirian MA for insurance. AVS initiated.   CM continues with coordinate intake.     Update: ISABEL received from [facility] for STS. Pt signed and copy sent to [facility] to have the assessment sent to [facility]. Pt reports [facility] is still his first choice. Will follow-up on 3/9.  "

## 2021-03-08 NOTE — PROGRESS NOTES
"[facility], [place]   Psychiatric Progress Note        Interim History:   The patient's care was discussed with the treatment team during the daily team meeting and/or staff's chart notes were reviewed.  Staff report patient had a good weekend, though, complained of severe withdrawal from nicotine. Was disappointed that bed at [facility] was not immediately available and even considered signing himself out. Completed alcohol withdrawal.    Met with patient: he was slightly agitated, but, overall, cooperative. Denied Suicidal ideation, Homicidal thoughts. Said that he needed some meds for his anxiety and depression. Stated that he had tried all SSRIs, SNRIs, Tricyclics and neither of them helped. We went over most of them, he declined trying any of them again. Said that meds didn't work even when he was not drinking. Said that Klonopin was helpful. I reminded him that Klonopin was a controlled substance and could not be prescribed to him in light of his severe chemical addiction. He was somewhat receptive to my words. Said that he would appreciate some info about ECT, however, was not ready to do it now. Declined Gabapentin: \"it is a joke. I have at home few bottles of this med. It doesn't work\". Agreed with my suggestion to try on a small dose of Zyprexa.   Reported above mentioned cravings to smoke. Agreed with my suggestion to increase his nicotine gum dose and start on Zyprexa for severe anxiety and had no further questions or concerns.          Medications:       folic acid  1 mg Oral Daily     lisinopril  10 mg Oral Daily     methadone  110 mg Oral Daily     multivitamin w/minerals  1 tablet Oral Daily     nicotine  1 patch Transdermal Daily     nicotine   Transdermal Q8H     OLANZapine  2.5 mg Oral BID     thiamine  100 mg Oral Daily          Allergies:     Allergies   Allergen Reactions     Penicillins      Possibly rash noted-- unsure     Penicillins      Sulfa Drugs      Possibly " rash - uncertain     Sulfa Drugs           Labs:   No results found for this or any previous visit (from the past 24 hour(s)).       Psychiatric Examination:     BP (!) 141/96   Pulse 57   Temp 97.6  F (36.4  C) (Temporal)   Resp 16   SpO2 96%   Weight is 0 lbs 0 oz  There is no height or weight on file to calculate BMI.  Orthostatic Vitals     None            Appearance: awake, alert, dressed in hospital scrubs and appeared as age stated  Attitude:  somewhat cooperative  Eye Contact:  fair  Mood:  anxious and sad   Affect:  appropriate and in normal range  Speech:  clear, coherent  Psychomotor Behavior:  no evidence of tardive dyskinesia, dystonia, or tics  Throught Process:  linear and goal oriented  Associations:  no loose associations  Thought Content:  no evidence of suicidal ideation or homicidal ideation  Insight:  partial  Judgement:  fair  Oriented to:  time, person, and place  Attention Span and Concentration:  fair  Recent and Remote Memory:  fair    Clinical Global Impressions  First: 5/3 3/8/2021      Most recent:            Precautions:     Behavioral Orders   Procedures     Code 1 - Restrict to Unit     Routine Programming     As clinically indicated     Seizure precautions     Status 15     Every 15 minutes.     Withdrawal precautions          DIagnoses:   Alcohol use disorder severe  Alcohol withdrawal severe  Major depressive disorder recurrent severe without psychosis  PTSD chronic  Anxiety disorder other specified  Nicotine use disorder severe  Opiate use disorder severe on methadone maintenance         Plan:   Will start on Zyprexa 2.5 mg bid. No other med changes. Will, likely, be discharged back to community within 1 day or 2. Will be seen by Dr. Ayala tomorrow.

## 2021-03-09 VITALS
DIASTOLIC BLOOD PRESSURE: 87 MMHG | RESPIRATION RATE: 16 BRPM | OXYGEN SATURATION: 95 % | SYSTOLIC BLOOD PRESSURE: 155 MMHG | HEART RATE: 59 BPM | TEMPERATURE: 97.4 F

## 2021-03-09 PROCEDURE — 93010 ELECTROCARDIOGRAM REPORT: CPT | Performed by: INTERNAL MEDICINE

## 2021-03-09 PROCEDURE — 250N000013 HC RX MED GY IP 250 OP 250 PS 637: Performed by: PSYCHIATRY & NEUROLOGY

## 2021-03-09 PROCEDURE — 99239 HOSP IP/OBS DSCHRG MGMT >30: CPT | Performed by: PSYCHIATRY & NEUROLOGY

## 2021-03-09 PROCEDURE — 250N000013 HC RX MED GY IP 250 OP 250 PS 637: Performed by: EMERGENCY MEDICINE

## 2021-03-09 RX ORDER — TRAZODONE HYDROCHLORIDE 50 MG/1
50-150 TABLET, FILM COATED ORAL
Status: DISCONTINUED | OUTPATIENT
Start: 2021-03-09 | End: 2021-03-09 | Stop reason: HOSPADM

## 2021-03-09 RX ORDER — NICOTINE 21 MG/24HR
1 PATCH, TRANSDERMAL 24 HOURS TRANSDERMAL DAILY
Qty: 30 PATCH | Refills: 0 | Status: SHIPPED | OUTPATIENT
Start: 2021-03-10 | End: 2022-01-12

## 2021-03-09 RX ORDER — OLANZAPINE 2.5 MG/1
2.5 TABLET, FILM COATED ORAL 2 TIMES DAILY
Qty: 60 TABLET | Refills: 0 | Status: SHIPPED | OUTPATIENT
Start: 2021-03-09 | End: 2022-01-12

## 2021-03-09 RX ORDER — TRAZODONE HYDROCHLORIDE 50 MG/1
50-150 TABLET, FILM COATED ORAL
Qty: 60 TABLET | Refills: 1 | Status: SHIPPED | OUTPATIENT
Start: 2021-03-09 | End: 2022-01-12

## 2021-03-09 RX ORDER — MULTIVITAMIN,THERAPEUTIC
1 TABLET ORAL DAILY
Qty: 30 TABLET | Refills: 0 | Status: ON HOLD | OUTPATIENT
Start: 2021-03-09 | End: 2022-04-04

## 2021-03-09 RX ORDER — LISINOPRIL 10 MG/1
10 TABLET ORAL DAILY
Qty: 30 TABLET | Refills: 0 | Status: SHIPPED | OUTPATIENT
Start: 2021-03-09 | End: 2022-01-12

## 2021-03-09 RX ORDER — LANOLIN ALCOHOL/MO/W.PET/CERES
100 CREAM (GRAM) TOPICAL DAILY
Qty: 30 TABLET | Refills: 0 | Status: ON HOLD | OUTPATIENT
Start: 2021-03-10 | End: 2022-04-04

## 2021-03-09 RX ADMIN — FOLIC ACID 1 MG: 1 TABLET ORAL at 08:13

## 2021-03-09 RX ADMIN — THIAMINE HCL TAB 100 MG 100 MG: 100 TAB at 08:13

## 2021-03-09 RX ADMIN — OLANZAPINE 2.5 MG: 2.5 TABLET ORAL at 08:13

## 2021-03-09 RX ADMIN — HYDROXYZINE HYDROCHLORIDE 25 MG: 25 TABLET, FILM COATED ORAL at 01:18

## 2021-03-09 RX ADMIN — NICOTINE 1 PATCH: 21 PATCH, EXTENDED RELEASE TRANSDERMAL at 08:13

## 2021-03-09 RX ADMIN — HYDROXYZINE HYDROCHLORIDE 25 MG: 25 TABLET, FILM COATED ORAL at 08:13

## 2021-03-09 RX ADMIN — NICOTINE POLACRILEX 8 MG: 4 GUM, CHEWING BUCCAL at 08:21

## 2021-03-09 RX ADMIN — METHADONE HYDROCHLORIDE 110 MG: 5 SOLUTION ORAL at 08:14

## 2021-03-09 RX ADMIN — NICOTINE POLACRILEX 4 MG: 4 GUM, CHEWING BUCCAL at 10:40

## 2021-03-09 RX ADMIN — MULTIPLE VITAMINS W/ MINERALS TAB 1 TABLET: TAB at 08:13

## 2021-03-09 ASSESSMENT — ACTIVITIES OF DAILY LIVING (ADL)
LAUNDRY: WITH SUPERVISION
ORAL_HYGIENE: INDEPENDENT
DRESS: STREET CLOTHES
HYGIENE/GROOMING: INDEPENDENT

## 2021-03-09 NOTE — PLAN OF CARE
"Pt states having a poor time sleeping for \"about a week\" \"I just can't sleep taking a normal dose of sleeping pills, reading, even not having slept for a week\".  Per chart review sleep hours are:  3/9  5.5  hours  3/8  6     hours  3/7  6.5  hours  3/6  5.75 hours    Instructed pt if he is awake to alert staff so they can try and help him get a full night of sleep with sleepy time tea, food, books or medications.    Overall pt states feeling better today compared to yesterday. Anxiety rated an 8, depression a 6 of 10 out of severity. Pt denies suicidal ideation plans or intent. Again declines lisinopril, pt given handout from ayo on demand on lisinopril Will continue to monitor and intervene as warranted.  "

## 2021-03-09 NOTE — DISCHARGE INSTRUCTIONS
Behavioral Discharge Planning and Instructions  THANK YOU FOR CHOOSING [facility]  3A  [phone]    Summary: You were admitted to Station 3A on 3/5/2021 for detoxification from alcohol.  A medical exam was performed that included lab work. You have met with a  and opted to attend residential treatment at [facility].  Please take care and make your recovery a daily priority, Shahram! It was a pleasure working with you and the entire treatment team here wishes you the very best in your recovery!     Recommendation:  Residential Treatment    Main Diagnoses:  Per Dr. Toby Ayala;  Alcohol use disorder severe  Alcohol withdrawal severe  Major depressive disorder recurrent severe without psychosis  PTSD chronic  Anxiety disorder other specified  Nicotine use disorder severe  Opiate use disorder severe on methadone maintenance    Major Treatments, Procedures and Findings:  You were treated for alcohol detoxification using [facility] protocol. You previously a chemical dependency assessment. You had labs drawn and those results were reviewed with you. Please take a copy of your lab work with you to your next primary care provider appointment.    Symptoms to Report:  If you experience more anxiety, confusion, sleeplessness, deep sadness or thoughts of suicide, notify your treatment team or notify your primary care provider. IF ANY OF THE SYMPTOMS YOU ARE EXPERIENCING ARE A MEDICAL EMERGENCY CALL 911 IMMEDIATELY.     Lifestyle Adjustment: Adjust your lifestyle to get enough sleep, relaxation, exercise and good nutrition. Continue to develop healthy coping skills to decrease stress and promote a sober living environment. Do not use mood altering substances including alcohol, illegal drugs or addictive medications other than what is currently prescribed.     Disposition: [facility]    Facts about COVID19 at www.cdc.gov/COVID19 and www.[place].gov/covid19    Keeping hands clean is one of the most important steps we can  take to avoid getting sick and spreading germs to others.  Please wash your hands frequently and lather with soap for at least 20 seconds!    Medical Follow-Up:  At noted on page 1 of this document with Dr. Cesar Aldrich on Tuesday Mar 23 @ 1:20 PM at [facility] [address] [facility] [id number] [phone]    Treatment Follow-Up:  [facility]  Admission: Tuesday March 9th at 1:00 pm  [address] [place] [address]  [phone]    Recovery apps for your phone to locate current in person and zoom recovery meetings  [place] - meeting itz  AA  - meeting itz  Meeting guide - meeting itz  Quick NA meeting - meeting itz  Jane- has various apps    Resources:  *due to covid-19 most AA/NA meetings are being held online*  AA meetings online search for them at: https://aa-intergroup.org (worldwide meeting listings)  AA meetings for [place] area can be found online at: https://a[place].org (click local online meetings listings)  NA meetings for [place] area can be found online at: https://www.nam[place].org  (click find a meeting)  AA and NA Sponsors are excellent resources for support and you can find one at any support group meeting.   Alcoholics Anonymous (https://aa.org/): for information 24 hours/day  AA Intergroup service office in [place] (http://www.aastpaul.org/) 951.979.1636  AA Intergroup service office in [facility]: [phone]. (http://www.a[place].org/)  Narcotics Anonymous (www.nam[place].org) (440) 892-6876  https://aafairviewriverside.org/meetings  SMART Recovery - self management for addiction recovery:  www.smartrecovery.org  Pathways ~ A Health Crisis Resource & Support Center:  199.262.5160.  https://prescribetoprevent.org/patient-education/videos/  http://www.harmreduction.org  [facility] 200-127-3534  Support Group:  AA/NA and Sponsor/support.  National [place] on Mental Illness (www.[place].colette.org):  670.319.1930 or 922-024-6008.  Alcoholics Anonymous (www.alcoholics-anonymous.org): Check your phone book for your local chapter.  Suicide Awareness Voices of Education (SAVE) (www.save.org): 869-721-BYCP (3040)  National Suicide Prevention Line (www.mentalhealthmn.org): 819-836-HGIE (8868)  Mental Health Consumer/Survivor Network of [place] (www.mhcsn.net): 337.772.6269 or 822-026-1336  Mental Health Association of [place] (www.mentalhealth.org): 863.512.7855 or 590-278-1060   Substance Abuse and Mental Health Services (www.samhsa.gov)  [place] Opioid Prevention Coalition: www.opioidcoalition.org    [place] Recovery Connection (MRC)  [facility] connects people seeking recovery to resources that help foster and sustain long-term recovery.  Whether you are seeking resources for treatment, transportation, housing, job training, education, health care or other pathways to recovery, [facility] is a great place to start.  645.608.8890.  www.[employer].[place] Pod casts for nutrition and wellness  Listen on Apple Podcasts  [employer]   Nutrition       Understand the connection between what you eat and how you feel. Hosted by licensed nutritionists and dietitians from [employer] we share practical, real-life solutions for healthier living through nutrition.     General Medication Instructions:   See your medication sheet(s) for instructions.   Take all medications as prescribed.  Make no changes unless your primary care provider suggests them.   Go to all your primary care provider visits.  Be sure to have all your required lab tests. This way, your medicines can be refilled on time.  Do not use any forms of alcohol.    Please Note:  If you have any questions at anytime after you are discharged please call [facility] detox unit [facility] at [phone].   Breanna Magaña, Behavioral Intake [phone]  Medical Records call [phone]  Outpatient Behavioral  Intake call [phone]  LP+ Wait List/Bed Availability call [phone]    Please remember to take all of your behavioral discharge planning and lab paperwork to any follow up appointments, it contains your lab results, diagnosis, medication list and discharge recommendations.      THANK YOU FOR CHOOSING [facility]

## 2021-03-09 NOTE — PROGRESS NOTES
" 03/08/2021   Groups   Details    (Psychotherapy)   Number of patients attending the group:  5  Group Length:  1 Hours     Group Therapy Type: Psychotherapy     Summary of Group / Topics Discussed: DBT House      The  Psychotherapy group goal is to promote insight to positive choice and change. Group processing is within a supportive and safe environment. Patients will process emotions using verbal group and expressive psychotherapy interventions including visual art/writing interventions.     Group interventions support patients by: process group-  DBT house     Subjective -patient report of mood today-\"not great, the day sucked, noone is doing anything to help me get to [facility]\"    Group Response- 5 members- engaged       Patient Response-Pt was cooperative, pleasant and engaged in the DBT house activity and therapeutic conversation.  He started out irritable and pessimistic, but he seemed to benefit from the activity and he shifted some of his negative thinking in the exercise. He was future and goal oriented in his responses.    Jonny Collazo, BONIFACIO, ATR-BC              "

## 2021-03-09 NOTE — DISCHARGE SUMMARY
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 for additional details.          Hospital Course:   PATIENT was admitted to Station 3Awith attending  under DR ayala, please review the detailed admit note on 3/6/2021   The patient was placed under status 15 (15 minute checks) to ensure patient safety.   MSSA protocol was initiated due to the patient's history of alcohol abuse and concern for withdrawal symptoms.  CBC, BMP and utox obtained.    All outpatient medications were continued patient for his depression has tried several SSRIs and SNRIs he has not tried ECT and is not open to trying it.    He was seen by Bhavin Kapoor  covering for me on 3/8/2021 Zyprexa was added  Patient was continued on methadone maintenance  Patient was started on Chantix he wants to quit smoking  EKG was done to see and patient does not have any QTC prolongation at this time  PATIENTdid participate in groups and was visible in the milieu.     The patient's symptoms of alcohol withdrawal improved improved.     Patients energy motivation , sleep appetite improved.  Pt completed detox . It was un eventful.      Discussed with patient medications for craving.  Spoke with patient about triggers coping skills relapse prevention.    CONSULTS DONE DURING PATIENTS HOSPITALIZATION.  Patient was seen by medicine on date    This as per their medical consult      Assessment & Recommendations  Shahram COREAS  Hector is a 35 year old man with a past medical history of depression, anxiety, PTSD, polysubstance abuse who is admitted to station 3A with alcohol withdrawal       Alcohol Withdrawal - Endorses drinking a gallon of whiskey daily for 5 months. No history of withdrawal seizures or DTs. Management per psychiatry team.      Transaminitis - , .  Suspect elevation related to alcohol consumption.  Patient denies exposure to hepatitis B or C.   -Repeat hepatic panel tomorrow  -If not downtrending, will consider abdominal ultrasound and hepatitis B and C labs.      Medicine will continue to follow,  please page with any additional concerns.       Pt was seen by cm  As per recommendations from cm  Placed call to [facility], intake reports that there are two beds available today and have been offered to the other individuals ahead of pt on the wait list. If one or both do not show then bed will be offered to pt and can admit today, 3/9. [facility] will call back as soon as more information develops with update. Pt and team notified. Pt appears happy with this update and is hopeful for discharge to [facility]. Will continue with care coordination.      Update: Bed available for pt today, 3/9. Intake scheduled for 1:00 pm. [facility] will send cab to pick-up pt at 1:00 pm and will call the unit when they arrive. Pt and team notified. AVS complete.            Labs:reviewed with patient       Recent Results (from the past 48 hour(s))   EKG 12-lead, complete    Collection Time: 03/09/21 10:07 AM   Result Value Ref Range    Interpretation ECG Click View Image link to view waveform and result          Recent Results (from the past 240 hour(s))   Drug abuse screen 6 urine (chem dep)    Collection Time: 03/05/21  2:27 PM   Result Value Ref Range    Amphetamine Qual Urine Negative NEG^Negative    Barbiturates Qual Urine Negative NEG^Negative    Benzodiazepine Qual Urine Positive (A) NEG^Negative    Cannabinoids Qual Urine Positive (A)  NEG^Negative    Cocaine Qual Urine Negative NEG^Negative    Ethanol Qual Urine Negative NEG^Negative    Opiates Qualitative Urine Negative NEG^Negative   CBC with platelets differential    Collection Time: 03/05/21  3:04 PM   Result Value Ref Range    WBC 5.6 4.0 - 11.0 10e9/L    RBC Count 4.42 4.4 - 5.9 10e12/L    Hemoglobin 15.4 13.3 - 17.7 g/dL    Hematocrit 44.0 40.0 - 53.0 %     78 - 100 fl    MCH 34.8 (H) 26.5 - 33.0 pg    MCHC 35.0 31.5 - 36.5 g/dL    RDW 11.9 10.0 - 15.0 %    Platelet Count 166 150 - 450 10e9/L    Diff Method Automated Method     % Neutrophils 56.1 %    % Lymphocytes 30.2 %    % Monocytes 9.6 %    % Eosinophils 2.1 %    % Basophils 1.8 %    % Immature Granulocytes 0.2 %    Nucleated RBCs 0 0 /100    Absolute Neutrophil 3.1 1.6 - 8.3 10e9/L    Absolute Lymphocytes 1.7 0.8 - 5.3 10e9/L    Absolute Monocytes 0.5 0.0 - 1.3 10e9/L    Absolute Eosinophils 0.1 0.0 - 0.7 10e9/L    Absolute Basophils 0.1 0.0 - 0.2 10e9/L    Abs Immature Granulocytes 0.0 0 - 0.4 10e9/L    Absolute Nucleated RBC 0.0    Comprehensive metabolic panel    Collection Time: 03/05/21  3:04 PM   Result Value Ref Range    Sodium 141 133 - 144 mmol/L    Potassium 3.5 3.4 - 5.3 mmol/L    Chloride 102 94 - 109 mmol/L    Carbon Dioxide 33 (H) 20 - 32 mmol/L    Anion Gap 6 3 - 14 mmol/L    Glucose 97 70 - 99 mg/dL    Urea Nitrogen 24 7 - 30 mg/dL    Creatinine 0.75 0.66 - 1.25 mg/dL    GFR Estimate >90 >60 mL/min/[1.73_m2]    GFR Estimate If Black >90 >60 mL/min/[1.73_m2]    Calcium 10.7 (H) 8.5 - 10.1 mg/dL    Bilirubin Total 0.9 0.2 - 1.3 mg/dL    Albumin 4.5 3.4 - 5.0 g/dL    Protein Total 7.9 6.8 - 8.8 g/dL    Alkaline Phosphatase 88 40 - 150 U/L     (H) 0 - 70 U/L     (H) 0 - 45 U/L   Asymptomatic SARS-CoV-2 COVID-19 Virus (Coronavirus) by PCR    Collection Time: 03/05/21  3:04 PM    Specimen: Nasopharyngeal   Result Value Ref Range    SARS-CoV-2 Virus Specimen Source Nasopharyngeal     SARS-CoV-2 PCR  Result NEGATIVE     SARS-CoV-2 PCR Comment (Note)    GGT    Collection Time: 03/05/21  3:04 PM   Result Value Ref Range     (H) 0 - 75 U/L   TSH with free T4 reflex    Collection Time: 03/05/21  3:04 PM   Result Value Ref Range    TSH 2.09 0.40 - 4.00 mU/L   Vitamin B12    Collection Time: 03/05/21  3:04 PM   Result Value Ref Range    Vitamin B12 1,110 (H) 193 - 986 pg/mL   Vitamin D Deficiency    Collection Time: 03/05/21  3:04 PM   Result Value Ref Range    Vitamin D Deficiency screening 15 (L) 20 - 75 ug/L   Alcohol breath test POCT    Collection Time: 03/05/21  3:13 PM   Result Value Ref Range    Alcohol Breath Test 0.000 0.00 - 0.01   Hepatic panel    Collection Time: 03/07/21  6:45 AM   Result Value Ref Range    Bilirubin Direct 0.2 0.0 - 0.2 mg/dL    Bilirubin Total 0.6 0.2 - 1.3 mg/dL    Albumin 3.4 3.4 - 5.0 g/dL    Protein Total 6.5 (L) 6.8 - 8.8 g/dL    Alkaline Phosphatase 70 40 - 150 U/L     (H) 0 - 70 U/L     (H) 0 - 45 U/L   EKG 12-lead, complete    Collection Time: 03/09/21 10:07 AM   Result Value Ref Range    Interpretation ECG Click View Image link to view waveform and result             Because this patient meets criteria for an Alcohol Use Disorder, I performed the following brief intervention on the date of this note:              1) Expressed concern that the patient is drinking at unhealthy levels known to increase their risk of alcohol related problems              2) Gave feedback linking alcohol use and health, including personalized feedback explaining how alcohol use can interact with their medical and/or psychiatric problems, and with prescribed medications.              3) Advised patient to abstain.    PT counseled on nicotine cessation and nicotine replacement provided    Discussed with patient many issues of addiction,triggers, relapse, and establishing a solid recovery program.    DISCHARGE MENTAL STATUS EXAMINATION:  The patient is alert, oriented x3.  Good  fund of knowledge.  Good use of language.  Recent and remote memory, language, fund of knowledge are all adequate.  Euthymic mood congruent affect  Speech normal rate/rhythm linear tp no loose asso,The patient does not have any active suicidal or homicidal ideation.  Does not have any auditory or visual hallucination.  Fair insight/judgment At this time, the patient was stable to be discharged.        Pt was not determined to not be a danger to himself or others. At the current time of discharge, the patient does not meet criteria for involuntary hospitalization. On the day of discharge, the patient reports that they do not have suicidal or homicidal ideation and would never hurt themselves or others. Steps taken to minimize risk include: assessing patient s behavior and thought process daily during hospital stay, discharging patient with adequate plan for follow up for mental and physical health and discussing safety plan of returning to the hospital should the patient ever have thoughts of harming themselves or others. Therefore, based on all available evidence including the factors cited above, the patient does not appear to be at imminent risk for self-harm, and is appropriate for outpatient level of care.     Educated about side effects/risk vs benefits /alternative including non treatment.Pt consented to be on medication.     .Total time spent on discharge summary more than 35 min  More than  20 min  planning, coordination of care, medication reconciliation and performance of physical exam on day of discharge.Care was coordinated with unit RN and unit therapist       Shahram Young   Home Medication Instructions KUSH:[id number]    Printed on:03/09/21 1032   Medication Information                      lisinopril (ZESTRIL) 10 MG tablet  Take 1 tablet (10 mg) by mouth daily             methadone (DOLPHINE) 5 MG/5ML solution  Take 110 mg by mouth daily             multivitamin, therapeutic (THERA-VIT) TABS  "tablet  Take 1 tablet by mouth daily             nicotine (NICODERM CQ) 21 MG/24HR 24 hr patch  Place 1 patch onto the skin daily             OLANZapine (ZYPREXA) 2.5 MG tablet  Take 1 tablet (2.5 mg) by mouth 2 times daily             thiamine (B-1) 100 MG tablet  Take 1 tablet (100 mg) by mouth daily             traZODone (DESYREL) 50 MG tablet  Take 1-3 tablets ( mg) by mouth nightly as needed for sleep (may repeat after 60 minutes)             varenicline (CHANTIX XAVI) 0.5 MG X 11 & 1 MG X 42 tablet  Take 0.5 mg tab daily for 3 days, THEN 0.5 mg tab twice daily for 4 days, THEN 1 mg twice daily.                  Disposition: [facility] treatment program     Facts about COVID19 at www.cdc.gov/COVID19 and www.[place].gov/covid19     Keeping hands clean is one of the most important steps we can take to avoid getting sick and spreading germs to others.  Please wash your hands frequently and lather with soap for at least 20 seconds!     Medical Follow-Up: He was asked to make a primary care appointment within 7 to 10 days       Treatment Follow-Up: [facility] treatment program will continue methadone at [facility]  .        \"Much or all of the text in this note was generated through the use of Dragon Dictate voice to text software. Errors in spelling or words which appear to be out of contact are unintentional, may be present due having escaped editing\"     "

## 2021-03-09 NOTE — PLAN OF CARE
"Late entry for [id number] shift on 3-8-2021  Patient visible in milieu, social with peers. Patient affect tense, mood is anxious and tense. Patient denies SI, SIB, HI, or hallucinations. Patient is out of acute alcohol detox. Patient Blood pressure remains elevated, however did not meet parameters for prn clonidine. Patient was given prn atenolol 50 mg times one for elevated pulse. Patient continues to rate anxiety as \"so high, it's always this high.\" Patient reports the only thing that worked and kept him from drinking alcohol was Clonazepam. Patient informed that the doctors do not prescribe that here, and patient verbally acknowledged understanding.Patient was started on scheduled Zyprexa 2.5 mg BID. Patient reports not much of an effect on his anxiety. Patient was given 25 mg of prn hydroxyzine times 2 for anxiety, was given 50 mg of trazodone times one this evening for sleep. Patient denies any additional concerns. BP (!) 154/109 (BP Location: Right arm)   Pulse 67   Temp 97.4  F (36.3  C) (Temporal)   Resp 16   SpO2 96%    "

## 2021-03-09 NOTE — PROGRESS NOTES
Placed call to [facility], intake reports that there are two beds available today and have been offered to the other individuals ahead of pt on the wait list. If one or both do not show then bed will be offered to pt and can admit today, 3/9. [facility] will call back as soon as more information develops with update. Pt and team notified. Pt appears happy with this update and is hopeful for discharge to [facility]. Will continue with care coordination.     Update: Bed available for pt today, 3/9. Intake scheduled for 1:00 pm. [facility] will send cab to pick-up pt at 1:00 pm and will call the unit when they arrive. Pt and team notified. AVS complete.

## 2021-03-10 LAB — INTERPRETATION ECG - MUSE: NORMAL

## 2021-03-23 ENCOUNTER — OFFICE VISIT (OUTPATIENT)
Dept: FAMILY MEDICINE | Facility: CLINIC | Age: 36
End: 2021-03-23
Payer: COMMERCIAL

## 2021-03-23 VITALS
DIASTOLIC BLOOD PRESSURE: 86 MMHG | HEART RATE: 80 BPM | SYSTOLIC BLOOD PRESSURE: 120 MMHG | HEIGHT: 71 IN | WEIGHT: 259 LBS | BODY MASS INDEX: 36.26 KG/M2 | TEMPERATURE: 97.6 F

## 2021-03-23 DIAGNOSIS — F11.21 OPIOID DEPENDENCE IN REMISSION (H): ICD-10-CM

## 2021-03-23 DIAGNOSIS — R74.01 TRANSAMINITIS: ICD-10-CM

## 2021-03-23 DIAGNOSIS — E66.01 MORBID OBESITY (H): ICD-10-CM

## 2021-03-23 DIAGNOSIS — G89.29 CHRONIC PAIN OF BOTH SHOULDERS: Primary | ICD-10-CM

## 2021-03-23 DIAGNOSIS — F32.0 MILD MAJOR DEPRESSION (H): ICD-10-CM

## 2021-03-23 DIAGNOSIS — M25.511 CHRONIC PAIN OF BOTH SHOULDERS: Primary | ICD-10-CM

## 2021-03-23 DIAGNOSIS — M25.512 CHRONIC PAIN OF BOTH SHOULDERS: Primary | ICD-10-CM

## 2021-03-23 LAB
BASOPHILS # BLD AUTO: 0.2 10E9/L (ref 0–0.2)
BASOPHILS NFR BLD AUTO: 2.4 %
DIFFERENTIAL METHOD BLD: ABNORMAL
EOSINOPHIL # BLD AUTO: 0.1 10E9/L (ref 0–0.7)
EOSINOPHIL NFR BLD AUTO: 1.8 %
ERYTHROCYTE [DISTWIDTH] IN BLOOD BY AUTOMATED COUNT: 12.3 % (ref 10–15)
HCT VFR BLD AUTO: 42.2 % (ref 40–53)
HGB BLD-MCNC: 14.4 G/DL (ref 13.3–17.7)
LYMPHOCYTES # BLD AUTO: 2.4 10E9/L (ref 0.8–5.3)
LYMPHOCYTES NFR BLD AUTO: 36.5 %
MCH RBC QN AUTO: 33.4 PG (ref 26.5–33)
MCHC RBC AUTO-ENTMCNC: 34.1 G/DL (ref 31.5–36.5)
MCV RBC AUTO: 98 FL (ref 78–100)
MONOCYTES # BLD AUTO: 0.7 10E9/L (ref 0–1.3)
MONOCYTES NFR BLD AUTO: 10.8 %
NEUTROPHILS # BLD AUTO: 3.3 10E9/L (ref 1.6–8.3)
NEUTROPHILS NFR BLD AUTO: 48.5 %
PLATELET # BLD AUTO: 419 10E9/L (ref 150–450)
RBC # BLD AUTO: 4.31 10E12/L (ref 4.4–5.9)
WBC # BLD AUTO: 6.7 10E9/L (ref 4–11)

## 2021-03-23 PROCEDURE — 36415 COLL VENOUS BLD VENIPUNCTURE: CPT | Performed by: FAMILY MEDICINE

## 2021-03-23 PROCEDURE — 99204 OFFICE O/P NEW MOD 45 MIN: CPT | Performed by: FAMILY MEDICINE

## 2021-03-23 PROCEDURE — 85025 COMPLETE CBC W/AUTO DIFF WBC: CPT | Performed by: FAMILY MEDICINE

## 2021-03-23 PROCEDURE — 80053 COMPREHEN METABOLIC PANEL: CPT | Performed by: FAMILY MEDICINE

## 2021-03-23 RX ORDER — NAPROXEN 500 MG/1
500 TABLET ORAL 2 TIMES DAILY PRN
Qty: 60 TABLET | Refills: 1 | Status: SHIPPED | OUTPATIENT
Start: 2021-03-23 | End: 2022-03-31

## 2021-03-23 SDOH — ECONOMIC STABILITY: FOOD INSECURITY: WITHIN THE PAST 12 MONTHS, THE FOOD YOU BOUGHT JUST DIDN'T LAST AND YOU DIDN'T HAVE MONEY TO GET MORE.: NOT ASKED

## 2021-03-23 SDOH — HEALTH STABILITY: MENTAL HEALTH: HOW OFTEN DO YOU HAVE 6 OR MORE DRINKS ON ONE OCCASION?: NOT ASKED

## 2021-03-23 SDOH — ECONOMIC STABILITY: TRANSPORTATION INSECURITY
IN THE PAST 12 MONTHS, HAS THE LACK OF TRANSPORTATION KEPT YOU FROM MEDICAL APPOINTMENTS OR FROM GETTING MEDICATIONS?: NOT ASKED

## 2021-03-23 SDOH — ECONOMIC STABILITY: FOOD INSECURITY: WITHIN THE PAST 12 MONTHS, YOU WORRIED THAT YOUR FOOD WOULD RUN OUT BEFORE YOU GOT MONEY TO BUY MORE.: NOT ASKED

## 2021-03-23 SDOH — HEALTH STABILITY: MENTAL HEALTH: HOW MANY STANDARD DRINKS CONTAINING ALCOHOL DO YOU HAVE ON A TYPICAL DAY?: NOT ASKED

## 2021-03-23 SDOH — HEALTH STABILITY: MENTAL HEALTH: HOW OFTEN DO YOU HAVE A DRINK CONTAINING ALCOHOL?: NOT ASKED

## 2021-03-23 SDOH — ECONOMIC STABILITY: TRANSPORTATION INSECURITY
IN THE PAST 12 MONTHS, HAS LACK OF TRANSPORTATION KEPT YOU FROM MEETINGS, WORK, OR FROM GETTING THINGS NEEDED FOR DAILY LIVING?: NOT ASKED

## 2021-03-23 SDOH — ECONOMIC STABILITY: INCOME INSECURITY: HOW HARD IS IT FOR YOU TO PAY FOR THE VERY BASICS LIKE FOOD, HOUSING, MEDICAL CARE, AND HEATING?: NOT ASKED

## 2021-03-23 ASSESSMENT — MIFFLIN-ST. JEOR: SCORE: 2124.82

## 2021-03-23 NOTE — PROGRESS NOTES
Assessment & Plan     Chronic pain of both shoulders  He has now had 2 to 3 months of pain in the shoulders bilaterally.  The area he points to is consistent with rotator cuff and so is his exam today.  I suggested he could see orthopedics for an opinion and sent a prescription to the pharmacy for naproxen.  Physical therapy would be a great option but hampered a little bit by the patient currently being in inpatient chemical dependency treatment.  - Orthopedic & Spine  Referral; Future  - naproxen (NAPROSYN) 500 MG tablet; Take 1 tablet (500 mg) by mouth 2 times daily as needed for moderate pain    Opioid dependence in remission (H)  The patient was interested in referral and establishment of mental health care outside of treatment.  He has had history with psychiatry in the past.  He reports that he is tried numerous different medications for treatment of depression and other mood disorders without benefit.  He brought up the possibility of an ADHD evaluation as well as genetic testing as these were suggested by somebody in treatment.  I think that those are reasonable options in the future.  I did discuss briefly the limitations of the genetic testing.  I provided a referral to mental health so that he can establish care with psychiatry upon discharge.  I have also provided a care coordination referral because he is some of the things he needs our help with psychosocial issues around transportation, housing, and the care transition from inpatient treatment to an outpatient setting.  - MENTAL HEALTH REFERRAL  - Adult; Psychiatry; [place]iat[place] [facility] [place] Collaborative [place] Psychiatry Service/Bridge to Long-Term Psychiatry as indicated ([phone]); Yes; Chronic Mental Health without improvement; Yes; We will contact you to schedule ...  - CARE COORDINATION REFERRAL    Morbid obesity (H)  Briefly discussed today.    Mild major depression (H)  As above.  - MENTAL HEALTH REFERRAL  - Adult; Psychiatry;  "Psychiatry; [facility]: Collaborative Care Psychiatry Service/Bridge to Long-Term Psychiatry as indicated ([phone]); Yes; Chronic Mental Health without improvement; Yes; We will contact you to schedule ...  - CARE COORDINATION REFERRAL    Transaminitis  Patient had some elevated liver enzymes that were trending down during his hospital stay.  Suggested a recheck today to make sure that those are continuing to improve during this period of abstinence.  If they remain elevated then he should consider evaluation for other possible causes.  - **Comprehensive metabolic panel FUTURE anytime; Future  - CBC with platelets and differential; Future             Tobacco Cessation:   reports that he has been smoking cigarettes. He has a 10.00 pack-year smoking history. He has quit using smokeless tobacco.  Tobacco Cessation Action Plan: Information offered: Patient not interested at this time  I would recommend that we focus on abstinence from alcohol and opiates at this time.    BMI:   Estimated body mass index is 36.26 kg/m  as calculated from the following:    Height as of this encounter: 1.8 m (5' 10.87\").    Weight as of this encounter: 117.5 kg (259 lb).   Weight management plan: Discussed healthy diet and exercise guidelines        Return in about 6 weeks (around 5/4/2021) for Physical Exam.    Cesar Aldrich MD  [facility]    Patrick Omalley is a 35 year old who presents for the following health issues     HPI     Musculoskeletal problem/pain  Onset/Duration: a couple months   Description  Location: shoulders - bilateral  Joint Swelling: no  Redness: no  Pain: YES, have not been able to lift arms pften without feeling pain   Warmth: no  Intensity:  mild, moderate, severe, varies   Progression of Symptoms:  same and constant  Accompanying signs and symptoms:   Fevers: no  Numbness/tingling/weakness: no  History  Trauma to the area: YES- might be from the fall a few months ago   Recent " "illness:  no  Previous similar problem: no  Previous evaluation:  no  Precipitating or alleviating factors:  Aggravating factors include: lifting, exercise and overuse, movement   Therapies tried and outcome: rest/inactivity, stretching and Ibuprofen    Questions/Concerns: would go over a few things with provider     The shoulder pain is been bothering him for a few months.  It is primarily with elevation and abduction.  He does not recall any particular injury but does report that he fell backwards while intoxicated and wonder if he reached backwards to brace his fall.  He has not seen anybody for this.  He is limited to Tylenol for pain control at this time.  He points to the deltoid region as the location of the pain without radiation further down the arm or back into the neck.  This bilateral, but the left shoulder seems to bother him a bit more.    He is also interested in establishing care for his mental health with psychiatry.  He also had a few questions about transitions in care coming up as well as transportation and housing issues.  I have suggested that care coordination referral might be beneficial for that and he is interested in pursuing that.  Right now he is currently in inpatient treatment so it may be difficult to get a hold of him.      Review of Systems   Constitutional, HEENT, cardiovascular, pulmonary, gi and gu systems are negative, except as otherwise noted.      Objective    /86   Pulse 80   Temp 97.6  F (36.4  C) (Temporal)   Ht 1.8 m (5' 10.87\")   Wt 117.5 kg (259 lb)   BMI 36.26 kg/m    Body mass index is 36.26 kg/m .  Physical Exam   GENERAL: healthy, alert and no distress  EYES: Eyes grossly normal to inspection, PERRL and conjunctivae and sclerae normal  HENT: ear canals and TM's normal, nose and mouth without ulcers or lesions  NECK: no adenopathy, no asymmetry, masses, or scars and thyroid normal to palpation  RESP: lungs clear to auscultation - no rales, rhonchi or " wheezes  CV: regular rate and rhythm, normal S1 S2, no S3 or S4, no murmur, click or rub, no peripheral edema and peripheral pulses strong  MS: There is no particular focal tenderness to palpation along the clavicle, AC joint, or proximal humerus bilaterally.  Range of motion through forward elevation, abduction, external rotation, and internal rotation with adduction is grossly normal but he has significant pain through the mid arc with forward elevation and abduction.  There is also reproduction of the patient's pain but relatively preserved strength with isolated bicep tendon and supraspinatus testing.  Some pain with resisted external rotation as well.  SKIN: no suspicious lesions or rashes  NEURO: Normal strength and tone, mentation intact and speech normal  PSYCH: mentation appears normal, affect normal/bright

## 2021-03-24 ENCOUNTER — PATIENT OUTREACH (OUTPATIENT)
Dept: CARE COORDINATION | Facility: CLINIC | Age: 36
End: 2021-03-24

## 2021-03-24 LAB
ALBUMIN SERPL-MCNC: 3.8 G/DL (ref 3.4–5)
ALP SERPL-CCNC: 69 U/L (ref 40–150)
ALT SERPL W P-5'-P-CCNC: 63 U/L (ref 0–70)
ANION GAP SERPL CALCULATED.3IONS-SCNC: 5 MMOL/L (ref 3–14)
AST SERPL W P-5'-P-CCNC: 45 U/L (ref 0–45)
BILIRUB SERPL-MCNC: 0.3 MG/DL (ref 0.2–1.3)
BUN SERPL-MCNC: 18 MG/DL (ref 7–30)
CALCIUM SERPL-MCNC: 9 MG/DL (ref 8.5–10.1)
CHLORIDE SERPL-SCNC: 104 MMOL/L (ref 94–109)
CO2 SERPL-SCNC: 27 MMOL/L (ref 20–32)
CREAT SERPL-MCNC: 0.78 MG/DL (ref 0.66–1.25)
GFR SERPL CREATININE-BSD FRML MDRD: >90 ML/MIN/{1.73_M2}
GLUCOSE SERPL-MCNC: 83 MG/DL (ref 70–99)
POTASSIUM SERPL-SCNC: 4.5 MMOL/L (ref 3.4–5.3)
PROT SERPL-MCNC: 7.1 G/DL (ref 6.8–8.8)
SODIUM SERPL-SCNC: 136 MMOL/L (ref 133–144)

## 2021-03-24 NOTE — PROGRESS NOTES
"Clinic Care Coordination Contact  [facility]/Voicemail       Clinical Data: Care Coordinator Outreach  Outreach attempted x 1.  Left message on patient's voicemail with call back information and requested return call.  Plan: Care Coordinator will try to reach patient again in 1-2 business days.    Routing to provider who saw pt yesterday to ask if there are any other phone numbers or ways to contact the pt. Removed the only number in pt's chart. Person who picked up the phone said \"there is no Shahram here and this isn't his phone number.\" From chart review, appears that pt is currently at [facility] for inpatient treatment.  "

## 2021-03-25 NOTE — PROGRESS NOTES
Clinic Care Coordination Contact  [facility]/Voicemail       Clinical Data: Care Coordinator Outreach  Outreach attempted x 2.  Left message on patient's voicemail with call back information and requested return call.  Plan: Care Coordinator will send care coordination introduction letter with care coordinator contact information and explanation of care coordination services via [employer]. Care Coordinator will do no further outreaches at this time.    Lvm with [facility] staff and asked them to pass it along to pt. Sending pt a [employer] message to see if we can connect.

## 2022-01-01 NOTE — PROGRESS NOTES
PDMP as of 3/8/2021:     Shahram Young   Risk Indicators   NARX SCORES  Narcotic  050  Sedative  110  Stimulant  000  Explanation and Guidance  OVERDOSE RISK SCORE     150    (Range 000-999)  Explanation and Guidance  ADDITIONAL RISK INDICATORS ( 0 )     Explanation and Guidance   This NarxCare report is based on search criteria supplied and the data entered by the dispensing pharmacy. For more information about any prescription, please contact the dispensing pharmacy or the prescriber. NarxCare scores and reports are intended to aid, not replace, medical decision making. None of the information presented should be used as sole justification for providing or refusing to provide medications. The information on this report is not warranted as accurate or complete.   Graphs   RX GRAPH  Narcotic Buprenorphine Sedative Stimulant Other     Prescribers    1 - GRECIA Carlin    Timeline  03/08  2m  6m  1y  2y    Buprenorphine mg    16    4    0  28    Timeline  03/08  2m  6m  1y  2y  Morphine MgEq (MME)    200    80    0  320    Timeline  03/08  2m  6m  1y  2y  Lorazepam MgEq (LME)    10    2    0  18    Timeline  03/08  2m  6m  1y  2y    *Per CDC guidance, the MME conversion factors prescribed or provided as part of the medication-assisted treatment for opioid use disorder should not be used to benchmark against dosage thresholds meant for opioids prescribed for pain. Buprenorphine products have no agreed upon morphine equivalency, and as partial opioid agonists, are not expected to be associated with overdose risk in the same dose-dependent manner as doses for full agonist opioids. MME = morphine milligram equivalents. LME = Lorazepam milligram equivalents. mg = dose in milligrams.     Summary     Summary  Total Prescriptions:     1   Total Prescribers:     1   Total Pharmacies:     1   Narcotics* (excluding Buprenorphine)  Current Qty:     0   Current MME/day:     0.00  30 Day Avg MME/day:     0.00    Sedatives*  Current Qty:     0   Current LME/day:    0.00  30 Day Avg LME/day:    0.00  Buprenorphine*  Current Qty:     0   Current mg/day:    0.00  30 Day Avg mg/day:    0.00  Rx Data   PRESCRIPTIONS  Total Prescriptions:  1  Total Private Pay:  0  Fill Date  ID  Written  Sold  Drug  Qty  Days  Prescriber  Rx #  Pharmacy  Refill  Daily Dose *  Pymt Type    12/30/2020   1   12/30/2020 12/30/2020   Diazepam 10 Mg Tablet   30.00  8  Er Ronquillo   [id number]   Omn (0396)   0/0  3.75 LME  Medicaid   [place]  *Per CDC guidance, the MME conversion factors prescribed or provided as part of the medication-assisted treatment for opioid use disorder should not be used to benchmark against dosage thresholds meant for opioids prescribed for pain. Buprenorphine products have no agreed upon morphine equivalency, and as partial opioid agonists, are not expected to be associated with overdose risk in the same dose-dependent manner as doses for full agonist opioids. MME = morphine milligram equivalents. LME = Lorazepam milligram equivalents. mg = dose in milligrams.     Providers  Total Providers: 1   Name   Address   City   State   Zipcode   Phone   Yunier Sweeney MD  [address]  [place]  [address] [phone]  Pharmacies  Total Pharmacies: 1   Name   Address   City   State   Zipcode   Phone   [facility] [phone]  [place]  [address] [phone]    The report provided is based upon the search criteria entered and the corresponding data as it has been reported by dispenser(s). If erroneous information is identified or additional information is needed, please contact the dispenser or the prescriber provided on the report. Date Sold signifies the date the prescription was sold (left the pharmacy). The absence of Date Sold does not necessarily indicate the prescription was not dispensed. Fill Date represents the date the medication was filled or prepared by the pharmacy. Note,  federal regulation (CFR Title 42: Part 2) requires patient consent prior to releasing certain patient data from federally funded opioid treatment programs (OTPs). As such, controlled substances dispensed from OTPs for medication-assisted treatment may not appear in the [place]  report. Morphine milligram equivalent (MME) conversion factors published by the CDC are used in the MME calculation. Per the CDC, the MME conversion factor is intended only for analytic purposes where prescription data are used to retrospectively calculate daily MME to inform analyses of risks associated with opioid prescribing. This value does not constitute clinical guidance or recommendations for converting patients from one form of opioid analgesic to another. Per the CDC, the conversion factors for drugs prescribed or provided, as part of medication-assisted treatment for opioid use disorder should not be used to benchmark against MME dosage thresholds meant for opioids prescribed for pain. Buprenorphine products listed in the CDC s MME file do not have an associated conversion factor. Lastly, the CDC notes, in clinical practice, calculating MME for methadone often involves a sliding-scale approach, whereby the conversion factor increases with increasing dose. The conversion factor of 3 for methadone presented in this file could underestimate MME for a given patient. This report contains confidential information, including patient identifiers, and is not a public record. The information on this report must be treated as protected health information and is only to be disclosed to others as authorized by applicable state and Federal regulations.         Powered By       [place] Prescription Monitoring Program  [place] [employer]  [address] [place] [address] [address]  [place] [address]  [phone]     [employer] 2021. All Rights Reserved. Privacy Policy    General: well appearing, NAD  Head: NC/AT  Cardiac: RRR, no m/r/g, 3+ pitting edema b/l LEs, extending into buttocks and back  Respiratory: diffuse crackles b/l, equal chest wall expansions, no conversational dyspnea  Abdomen: soft, ND, NT  Neuro: AAOx3,coordinated movement of all 4 extremities  Psych: calm, cooperative, normal affect  Skin/MSK: skin tear L forearm, area of erythema surrounding it and extending superiorly with 1+ pitting edema

## 2022-01-12 ENCOUNTER — OFFICE VISIT (OUTPATIENT)
Dept: FAMILY MEDICINE | Facility: CLINIC | Age: 37
End: 2022-01-12
Payer: COMMERCIAL

## 2022-01-12 VITALS
WEIGHT: 271.6 LBS | SYSTOLIC BLOOD PRESSURE: 156 MMHG | BODY MASS INDEX: 38.02 KG/M2 | DIASTOLIC BLOOD PRESSURE: 102 MMHG | TEMPERATURE: 97.7 F | HEART RATE: 89 BPM | OXYGEN SATURATION: 94 %

## 2022-01-12 DIAGNOSIS — F10.10 ALCOHOL ABUSE: Primary | ICD-10-CM

## 2022-01-12 DIAGNOSIS — Z71.6 ENCOUNTER FOR SMOKING CESSATION COUNSELING: ICD-10-CM

## 2022-01-12 DIAGNOSIS — R10.84 ABDOMINAL PAIN, GENERALIZED: ICD-10-CM

## 2022-01-12 DIAGNOSIS — F32.2 CURRENT SEVERE EPISODE OF MAJOR DEPRESSIVE DISORDER WITHOUT PSYCHOTIC FEATURES, UNSPECIFIED WHETHER RECURRENT (H): ICD-10-CM

## 2022-01-12 DIAGNOSIS — I10 HTN, GOAL BELOW 140/90: ICD-10-CM

## 2022-01-12 DIAGNOSIS — F10.930 ALCOHOL WITHDRAWAL SYNDROME WITHOUT COMPLICATION (H): ICD-10-CM

## 2022-01-12 LAB
ALBUMIN SERPL-MCNC: 4.1 G/DL (ref 3.4–5)
ALP SERPL-CCNC: 63 U/L (ref 40–150)
ALT SERPL W P-5'-P-CCNC: 143 U/L (ref 0–70)
AMPHETAMINES UR QL SCN: ABNORMAL
ANION GAP SERPL CALCULATED.3IONS-SCNC: 9 MMOL/L (ref 3–14)
AST SERPL W P-5'-P-CCNC: 121 U/L (ref 0–45)
BARBITURATES UR QL: ABNORMAL
BENZODIAZ UR QL: ABNORMAL
BILIRUB SERPL-MCNC: 0.3 MG/DL (ref 0.2–1.3)
BUN SERPL-MCNC: 19 MG/DL (ref 7–30)
CALCIUM SERPL-MCNC: 8.9 MG/DL (ref 8.5–10.1)
CANNABINOIDS UR QL SCN: ABNORMAL
CHLORIDE BLD-SCNC: 105 MMOL/L (ref 94–109)
CO2 SERPL-SCNC: 27 MMOL/L (ref 20–32)
COCAINE UR QL: ABNORMAL
CREAT SERPL-MCNC: 0.8 MG/DL (ref 0.66–1.25)
ERYTHROCYTE [DISTWIDTH] IN BLOOD BY AUTOMATED COUNT: 14 % (ref 10–15)
ETHANOL UR QL SCN: ABNORMAL
FOLATE SERPL-MCNC: 4.5 NG/ML
GFR SERPL CREATININE-BSD FRML MDRD: >90 ML/MIN/1.73M2
GGT SERPL-CCNC: 130 U/L (ref 0–75)
GLUCOSE BLD-MCNC: 91 MG/DL (ref 70–99)
HCT VFR BLD AUTO: 44.8 % (ref 40–53)
HGB BLD-MCNC: 15.2 G/DL (ref 13.3–17.7)
LIPASE SERPL-CCNC: 88 U/L (ref 73–393)
MCH RBC QN AUTO: 32.1 PG (ref 26.5–33)
MCHC RBC AUTO-ENTMCNC: 33.9 G/DL (ref 31.5–36.5)
MCV RBC AUTO: 95 FL (ref 78–100)
OPIATES UR QL SCN: ABNORMAL
PLATELET # BLD AUTO: 245 10E3/UL (ref 150–450)
POTASSIUM BLD-SCNC: 4 MMOL/L (ref 3.4–5.3)
PROT SERPL-MCNC: 7.8 G/DL (ref 6.8–8.8)
RBC # BLD AUTO: 4.73 10E6/UL (ref 4.4–5.9)
SODIUM SERPL-SCNC: 141 MMOL/L (ref 133–144)
WBC # BLD AUTO: 6.8 10E3/UL (ref 4–11)

## 2022-01-12 PROCEDURE — 99215 OFFICE O/P EST HI 40 MIN: CPT | Performed by: NURSE PRACTITIONER

## 2022-01-12 PROCEDURE — 36415 COLL VENOUS BLD VENIPUNCTURE: CPT | Performed by: NURSE PRACTITIONER

## 2022-01-12 PROCEDURE — 85027 COMPLETE CBC AUTOMATED: CPT | Performed by: NURSE PRACTITIONER

## 2022-01-12 PROCEDURE — 82746 ASSAY OF FOLIC ACID SERUM: CPT | Performed by: NURSE PRACTITIONER

## 2022-01-12 PROCEDURE — 96127 BRIEF EMOTIONAL/BEHAV ASSMT: CPT | Performed by: NURSE PRACTITIONER

## 2022-01-12 PROCEDURE — 80307 DRUG TEST PRSMV CHEM ANLYZR: CPT | Performed by: NURSE PRACTITIONER

## 2022-01-12 PROCEDURE — 2894A DRUG ABUSE SCREEN 6 CHEM DEP URINE (UMMC): CPT | Performed by: NURSE PRACTITIONER

## 2022-01-12 PROCEDURE — 83690 ASSAY OF LIPASE: CPT | Performed by: NURSE PRACTITIONER

## 2022-01-12 PROCEDURE — 82306 VITAMIN D 25 HYDROXY: CPT | Performed by: NURSE PRACTITIONER

## 2022-01-12 PROCEDURE — 80053 COMPREHEN METABOLIC PANEL: CPT | Performed by: NURSE PRACTITIONER

## 2022-01-12 PROCEDURE — 82607 VITAMIN B-12: CPT | Performed by: NURSE PRACTITIONER

## 2022-01-12 PROCEDURE — 82977 ASSAY OF GGT: CPT | Performed by: NURSE PRACTITIONER

## 2022-01-12 RX ORDER — LISINOPRIL 10 MG/1
10 TABLET ORAL DAILY
Qty: 90 TABLET | Refills: 1 | Status: ON HOLD | OUTPATIENT
Start: 2022-01-12 | End: 2022-03-16

## 2022-01-12 RX ORDER — NICOTINE 21 MG/24HR
1 PATCH, TRANSDERMAL 24 HOURS TRANSDERMAL DAILY
Qty: 30 PATCH | Refills: 0 | Status: ON HOLD | OUTPATIENT
Start: 2022-01-12 | End: 2022-04-04

## 2022-01-12 ASSESSMENT — ANXIETY QUESTIONNAIRES
3. WORRYING TOO MUCH ABOUT DIFFERENT THINGS: NEARLY EVERY DAY
5. BEING SO RESTLESS THAT IT IS HARD TO SIT STILL: NEARLY EVERY DAY
4. TROUBLE RELAXING: NEARLY EVERY DAY
6. BECOMING EASILY ANNOYED OR IRRITABLE: NEARLY EVERY DAY
IF YOU CHECKED OFF ANY PROBLEMS ON THIS QUESTIONNAIRE, HOW DIFFICULT HAVE THESE PROBLEMS MADE IT FOR YOU TO DO YOUR WORK, TAKE CARE OF THINGS AT HOME, OR GET ALONG WITH OTHER PEOPLE: EXTREMELY DIFFICULT
1. FEELING NERVOUS, ANXIOUS, OR ON EDGE: NEARLY EVERY DAY
GAD7 TOTAL SCORE: 21
7. FEELING AFRAID AS IF SOMETHING AWFUL MIGHT HAPPEN: NEARLY EVERY DAY
2. NOT BEING ABLE TO STOP OR CONTROL WORRYING: NEARLY EVERY DAY

## 2022-01-12 ASSESSMENT — PATIENT HEALTH QUESTIONNAIRE - PHQ9: SUM OF ALL RESPONSES TO PHQ QUESTIONS 1-9: 27

## 2022-01-12 NOTE — PROGRESS NOTES
Assessment & Plan     Alcohol abuse  Checking labs, referring to Mental health for possible in patient treatment, OP treatment as indicated. Currently drinking up to 1 L whiskey/day since the weather gotcold.  - Drug abuse screen 6 urine (chem dep) ([facility])  - Vitamin B12  - Folate  - Vitamin D Deficiency  - Adult Mental Health Referral  - Drug abuse screen 6 urine (chem dep) ([facility])  - Vitamin B12  - Folate  - Vitamin D Deficiency    HTN, goal below 140/90  Restart Lisinopril which he has not been taking , home BP monitor, goal BP<140/90, follow back 3 weeks for BP check.  - Home Blood Pressure Monitor Order for DME - ONLY FOR DME  - lisinopril (ZESTRIL) 10 MG tablet  Dispense: 90 tablet; Refill: 1    Current severe episode of major depressive disorder without psychotic features, unspecified whether recurrent (H)  Starting Prozac. Declined to start on Zyprexa as he's on Methadoneand this combination can prolong QTc, referring to psychiatry for stabilization, reviewed safety plan/emergency contact numbers, go to ER for new/worsening symptoms.  - FLUoxetine (PROZAC) 20 MG capsule  Dispense: 90 capsule; Refill: 1  - Adult Mental Health Referral    Abdominal pain, generalized  Large differential- checking labs, I will call with abnormals/plan. Consider imaging based on labs.  - CBC with platelets  - Comprehensive metabolic panel (BMP + Alb, Alk Phos, ALT, AST, Total. Bili, TP)  - GGT  - Lipase  - Fecal colorectal cancer screen (FIT)  - CBC with platelets  - Comprehensive metabolic panel (BMP + Alb, Alk Phos, ALT, AST, Total. Bili, TP)  - GGT  - Lipase    Encounter for smoking cessation counseling  Declined to write for Chantix given severe depression as it can worsen depressive symptoms. Deferring to mental health to restart this, wrote for nicoderm patches, cut out 1 cigarette/day.  - nicotine (NICODERM CQ) 21 MG/24HR 24 hr patch  Dispense: 30 patch; Refill: 0    Ordering of each unique test  Prescription drug  "management  40 minutes spent on the date of the encounter doing chart review, history and exam, documentation and further activities per the note       BMI:   Estimated body mass index is 38.02 kg/m  as calculated from the following:    Height as of 3/23/21: 1.8 m (5' 10.87\").    Weight as of this encounter: 123.2 kg (271 lb 9.6 oz).   Weight management plan: Discussed healthy diet and exercise guidelines    Depression Screening Follow Up    PHQ 1/12/2022   PHQ-9 Total Score 27   Q9: Thoughts of better off dead/self-harm past 2 weeks Nearly every day     Last PHQ-9 1/12/2022   1.  Little interest or pleasure in doing things 3   2.  Feeling down, depressed, or hopeless 3   3.  Trouble falling or staying asleep, or sleeping too much 3   4.  Feeling tired or having little energy 3   5.  Poor appetite or overeating 3   6.  Feeling bad about yourself 3   7.  Trouble concentrating 3   8.  Moving slowly or restless 3   Q9: Thoughts of better off dead/self-harm past 2 weeks 3   PHQ-9 Total Score 27   Difficulty at work, home, or with people Extremely dIfficult           Follow Up Actions Taken  Crisis resource information provided in the After Visit Summary  Referred patient back to mental health provider  Mental Health Referral placed    Discussed the following ways the patient can remain in a safe environment:  remove alcohol, remove drugs, secure medications: ,, remove access to firearms: ,, remove things I could use to hurt myself: , and be around others  Work on weight loss  Regular exercise  See Patient Instructions    Return in about 3 years (around 1/12/2025), or if symptoms worsen or fail to improve, for Follow up.    NACHO Green CNP  [facility]    Patrick Omalley is a 36 year old who presents for the following health issues       HPI     Depression Followup    How are you doing with your depression since your last visit? Worsened - has difficulty during the Winter " "months    Are you having other symptoms that might be associated with depression? Yes:  .    Have you had a significant life event?  Relationship Concerns, Job Concerns, Financial Concerns, Housing Concerns and Health Concerns     Are you feeling anxious or having panic attacks?   Yes:  daily    Do you have any concerns with your use of alcohol or other drugs? Yes:  Will drink up to 1 l WHISKEY/NOC, ALSO SMOKES MARIJUANA DAILY  Requesting refill of Zyprexa- reports he's tried \"all the SSRI's, SNRI's and they do not work.\"  He last took Zyprexa in march after discharge from alcohol treatment program. He endorses suicidal ideation but has no plan or intent, states it's a sin to kill yourself. He has psychiatry appt scheduled for 2/8/21. PHQ-9=20, JALEN-7=27 today.  Social History     Tobacco Use     Smoking status: Current Every Day Smoker     Packs/day: 1.00     Years: 10.00     Pack years: 10.00     Types: Cigarettes     Smokeless tobacco: Former User     Tobacco comment: Planning use of Nicotrol for stopping cigarettes   Substance Use Topics     Alcohol use: Not Currently     Comment: Pt has been drinking 1.75L whiskey per day     Drug use: Yes     Frequency: 7.0 times per week     Types: Marijuana     Comment: usually has 1-2 hits at night to sleep   Requests refill of Chantix to quit smoking.    PHQ 1/12/2022   PHQ-9 Total Score 27   Q9: Thoughts of better off dead/self-harm past 2 weeks Nearly every day     JALEN-7 SCORE 7/28/2011 1/12/2022   Total Score 21 -   Total Score - 21     Last PHQ-9 1/12/2022   1.  Little interest or pleasure in doing things 3   2.  Feeling down, depressed, or hopeless 3   3.  Trouble falling or staying asleep, or sleeping too much 3   4.  Feeling tired or having little energy 3   5.  Poor appetite or overeating 3   6.  Feeling bad about yourself 3   7.  Trouble concentrating 3   8.  Moving slowly or restless 3   Q9: Thoughts of better off dead/self-harm past 2 weeks 3   PHQ-9 Total Score " "27   Difficulty at work, home, or with people Extremely dIfficult     JALEN-7  1/12/2022   1. Feeling nervous, anxious, or on edge 3   2. Not being able to stop or control worrying 3   3. Worrying too much about different things 3   4. Trouble relaxing 3   5. Being so restless that it is hard to sit still 3   6. Becoming easily annoyed or irritable 3   7. Feeling afraid, as if something awful might happen 3   JALEN-7 Total Score 21   If you checked any problems, how difficult have they made it for you to do your work, take care of things at home, or get along with other people? Extremely difficult       Suicide Assessment Five-step Evaluation and Treatment (SAFE-T)      How many servings of fruits and vegetables do you eat daily?  2-3    On average, how many sweetened beverages do you drink each day (Examples: soda, juice, sweet tea, etc.  Do NOT count diet or artificially sweetened beverages)?   0    How many days per week do you exercise enough to make your heart beat faster? 3 or less    How many minutes a day do you exercise enough to make your heart beat faster? 9 or less    How many days per week do you miss taking your medication? 0    ABDOMINAL PAIN- HISTORY OF COLON RESECTION FOR CHRONIC DIVERTICULITIS 2013, complains of intermittent LLQ, LUQ, RUQ abdominal pain X 2 weeks, comes and goes, burning\"cutting pain,\" feels like \"eating broken glass,\" stool was red the other day but no blood in toilet after defecation.  He's had nausea and vomited a few times over the last 2 weeks, appetite is decreased- will not eat for a few days, then overeats/pain increases and then stops eating for several days.  C/o fatigue. No weakness, dizziness, palpitations, or chest pain.        How many servings of fruits and vegetables do you eat daily?  0-1    On average, how many sweetened beverages do you drink each day (Examples: soda, juice, sweet tea, etc.  Do NOT count diet or artificially sweetened beverages)?   0    How many days " per week do you exercise enough to make your heart beat faster? 3 or less    How many minutes a day do you exercise enough to make your heart beat faster? 9 or less    How many days per week do you miss taking your medication? 0      Review of Systems   Constitutional, HEENT, cardiovascular, pulmonary, gi and gu systems are negative, except as otherwise noted.      Objective    BP (!) 156/102   Pulse 89   Temp 97.7  F (36.5  C) (Tympanic)   Wt 123.2 kg (271 lb 9.6 oz)   SpO2 94%   BMI 38.02 kg/m    Body mass index is 38.02 kg/m .  Physical Exam   GENERAL: healthy, alert and no distress  EYES: Eyes grossly normal to inspection, PERRL and conjunctivae and sclerae normal  HENT: ear canals and TM's normal, nose and mouth without ulcers or lesions  NECK: no adenopathy, no asymmetry, masses, or scars and thyroid normal to palpation  RESP: lungs clear to auscultation - no rales, rhonchi or wheezes  CV: regular rate and rhythm, normal S1 S2, no S3 or S4, no murmur, click or rub, no peripheral edema and peripheral pulses strong  ABDOMEN: tenderness RUQ, LUQ and LLQ, no organomegaly or masses, bowel sounds normal and no bruits heard  MS: no gross musculoskeletal defects noted, no edema  SKIN: no suspicious lesions or rashes  NEURO: Normal strength and tone, mentation intact and speech normal  BACK: no CVA tenderness, no paralumbar tenderness  PSYCH: mentation appears normal, affect normal/bright  LYMPH: normal ant/post cervical, supraclavicular nodes    Results for orders placed or performed in visit on 01/12/22 (from the past 24 hour(s))   CBC with platelets   Result Value Ref Range    WBC Count 6.8 4.0 - 11.0 10e3/uL    RBC Count 4.73 4.40 - 5.90 10e6/uL    Hemoglobin 15.2 13.3 - 17.7 g/dL    Hematocrit 44.8 40.0 - 53.0 %    MCV 95 78 - 100 fL    MCH 32.1 26.5 - 33.0 pg    MCHC 33.9 31.5 - 36.5 g/dL    RDW 14.0 10.0 - 15.0 %    Platelet Count 245 150 - 450 10e3/uL

## 2022-01-12 NOTE — PATIENT INSTRUCTIONS
Patient Education     Abdominal Pain  Abdominal pain is pain in the stomach or belly area. Everyone has this pain from time to time. In many cases it goes away on its own. But abdominal pain can sometimes be due to a serious problem, such as appendicitis. So it s important to know when to get help.    Causes of abdominal pain  There are many possible causes of abdominal pain. Common causes in adults include:    Constipation, diarrhea, or gas    Stomach acid flowing back up into the esophagus (acid reflux or heartburn)    Severe acid reflux, called GERD (gastroesophageal reflux disease)    A sore in the lining of the stomach or small intestine (peptic ulcer)    Inflammation of the gallbladder, liver, or pancreas    Gallstones or kidney stones    Appendicitis     Intestinal blockage     An internal organ pushing through a muscle or other tissue (hernia)    Urinary tract infections    In women, menstrual cramps, fibroids, ovarian cysts, pelvic inflammatory disease, or endometriosis    Inflammation or infection of the intestines, including Crohn's disease and ulcerative colitis    Irritable bowel syndrome  Diagnosing the cause of abdominal pain  Your healthcare provider will give you a physical exam help find the cause of your pain. If needed, you will have tests. Belly pain has many possible causes. So it can be hard to find the reason for your pain. Giving details about your pain can help. Tell your provider where and when you feel the pain, and what makes it better or worse. Also let your provider know if you have other symptoms such as:    Fever    Tiredness    Upset stomach (nausea)    Vomiting    Changes in bathroom habits    Blood in the stool or black, tarry stool    Weight loss that you can't explain (involuntary weight loss?)  Also report any family history of stomach or intestinal problems, or cancers. Tell your provider about all your alcohol use and drug use. Tell your provider about all medicines you  use, including herbs, vitamins, and supplements.  Treating abdominal pain  Some causes of pain need emergency medical treatment right away. These include appendicitis or a bowel blockage. Other problems can be treated with rest, fluids, or medicines. Your healthcare provider can give you specific instructions for treatment or self-care based on what is causing your pain.     If you have vomiting or diarrhea, sip water or other clear fluids. When you are ready to eat solid foods again, start with small amounts of easy-to-digest, low-fat foods. These include apple sauce, toast, or crackers.  When to get medical care  Call 911 or go to the hospital right away if you:    Can t pass stool and are vomiting    Are vomiting blood or have bloody diarrhea or black, tarry diarrhea    Have chest, neck, or shoulder pain    Feel like you might pass out    Have pain in your shoulder blades with nausea    Have sudden, severe belly pain    Have new, severe pain unlike any you have felt before    Have a belly that is rigid, hard, and hurts to touch  Call your healthcare provider if you have:    Pain for more than 5 days    Bloating for more than 2 days    Diarrhea for more than 5 days    A fever of 100.4 F (38 C) or higher, or as directed by your healthcare provider    Pain that gets worse    Weight loss for no reason    Continued lack of appetite    Blood in your stool  How to prevent abdominal pain  Here are some tips to help prevent abdominal pain:    Eat smaller amounts of food at each meal.    Don't eat greasy, fried, or other high-fat foods.    Don't eat foods that give you gas.    Exercise regularly.    Drink plenty of fluids.  To help prevent GERD symptoms:    Quit smoking.    Reduce alcohol and foods that increase stomach acid.    Don't use aspirin or over-the-counter pain and fever medicines, if possible. This includes nonsteroidal anti-inflammatory drugs (NSAIDs).    Lose excess weight.    Finish eating at least 2 hours  before you go to bed or lie down.    Raise the head of your bed.  [employer] last reviewed this educational content on 4/1/2019 2000-2021 [employer]. All rights reserved. This information is not intended as a substitute for professional medical care. Always follow your healthcare professional's instructions.           Patient Education     Alcohol Addiction  How many times in the past year have you had 5 drinks in a day (men) or 4 drinks in a day (women)? Does your drinking harm yourself or others? Or has it led to other problems with your daily life? If so, you may be addicted to alcohol.  You may have what's called an alcohol use disorder. Your healthcare provider may make this diagnosis if you have had at least 2 of these problems in a year:    You drink alcohol in larger amounts or for a longer period than you planned.    You often want to cut down or control how much you drink. Or you have often failed to do so.    You spend a lot of time getting alcohol, using it, or recovering from its use.    You crave or have a strong desire or urge to drink.    Your drinking makes it hard for you to be responsible at work, school, or home.    You keep on drinking even though you have had problems in relationships or social settings because of it.    You give up or miss important social, work, or other activities because of your drinking.    You drink alcohol at times when it's not physically safe, such as drinking then driving.    You keep on drinking even though you know it has caused physical or emotional problems.    You need more and more alcohol to get the same effects.    You hide how much you drink from family and friends.    You have withdrawal symptoms or use alcohol to avoid such symptoms.    You have a drink the first thing in the morning to get rid of a hangover or calm yourself  [employer] reviewed this educational content on 6/1/2020 2000-2021 [employer]. All rights  reserved. This information is not intended as a substitute for professional medical care. Always follow your healthcare professional's instructions.           Patient Education     Prozac Oral Capsule 20 mg  Uses  This medicine is used for the following purposes:    anxiety    depression    eating disorders    menopausal symptoms    muscle weakness    obsessive compulsive disorder    post-traumatic stress disorder  Instructions  This medicine may be taken with or without food.  It is very important that you take the medicine at about the same time every day. It will work best if you do this.  Keep the medicine at room temperature. Avoid heat and direct light.  It is important that you keep taking each dose of this medicine on time even if you are feeling well.  If you forget to take a dose on time, take it as soon as you remember. If it is almost time for the next dose, do not take the missed dose. Return to your normal dosing schedule. Do not take 2 doses of this medicine at one time.  Please tell your doctor and pharmacist about all the medicines you take. Include both prescription and over-the-counter medicines. Also tell them about any vitamins, herbal medicines, or anything else you take for your health.  Do not suddenly stop taking this medicine. Check with your doctor before stopping.  Cautions  Tell your doctor and pharmacist if you ever had an allergic reaction to a medicine. Symptoms of an allergic reaction can include trouble breathing, skin rash, itching, swelling, or severe dizziness.  Do not use the medication any more than instructed.  Your ability to stay alert or to react quickly may be impaired by this medicine. Do not drive or operate machinery until you know how this medicine will affect you.  Please check with your doctor before drinking alcohol while on this medicine.  Contact your doctor if you notice a change in the amount or darkening of your urine.  Family should check on the patient often.  Call the doctor if patient becomes more depressed, has thoughts of suicide, or shows changes in behavior.  Call the doctor if there are any signs of confusion or unusual changes in behavior.  Tell the doctor or pharmacist if you are pregnant, planning to be pregnant, or breastfeeding.  Ask your pharmacist if this medicine can interact with any of your other medicines. Be sure to tell them about all the medicines you take.  Do not start or stop any other medicines without first speaking to your doctor or pharmacist.  If you have painful erection or an erection for more than 4 hours, seek medical care right away.  Do not share this medicine with anyone who has not been prescribed this medicine.  This medicine can cause serious side effects in some patients. Important information from the [place] Food and Drug Administration (FDA) is available from your pharmacist. Please review it carefully with your pharmacist to understand the risks associated with this medicine.  Side Effects  The following is a list of some common side effects from this medicine. Please speak with your doctor about what you should do if you experience these or other side effects.    agitated feeling or trouble sleeping    decreased appetite    dizziness    drowsiness or sedation    lack of energy and tiredness    nausea    sweating  Call your doctor or get medical help right away if you notice any of these more serious side effects:    bleeding that is severe or takes longer to stop    unusual bruising or discoloration on skin    confusion    coughing up blood or vomit that looks like coffee grounds    swelling of the legs, feet, and hands    pain in the eye    fainting    hallucinations (unusual thoughts, seeing or hearing things that are not real)    fast or irregular heart beats    muscle aches, spasms or abnormal movements    muscle weakness    dilation of the pupils    problems with sexual functions or desire    blood in stool    dark, tarry  stool    suicidal thoughts    blurring or changes of vision    severe or persistent vomiting    unexpected or extreme weight loss  A few people may have an allergic reactions to this medicine. Symptoms can include difficulty breathing, skin rash, itching, swelling, or severe dizziness. If you notice any of these symptoms, seek medical help quickly.  Extra  Please speak with your doctor, nurse, or pharmacist if you have any questions about this medicine.  [email or web address]  IMPORTANT NOTE: This document tells you briefly how to take your medicine, but it does not tell you all there is to know about it.Your doctor or pharmacist may give you other documents about your medicine. Please talk to them if you have any questions.Always follow their advice. There is a more complete description of this medicine available in English.Scan this code on your smartphone or tablet or use the web address below. You can also ask your pharmacist for a printout. If you have any questions, please ask your pharmacist.     2021 [email or web address].    If you are in crisis, you can call the Crisis Connection Hotline 24/7 at [phone]  [facility] open 24/7 for anyone in crisis for assessment, evaluation and care: [phone]  If you are thinking of hurting yourself or someone else, you can also go to the emergency department or call [phone]

## 2022-01-13 LAB
DEPRECATED CALCIDIOL+CALCIFEROL SERPL-MC: 18 UG/L (ref 20–75)
VIT B12 SERPL-MCNC: 741 PG/ML (ref 193–986)

## 2022-01-13 ASSESSMENT — ANXIETY QUESTIONNAIRES: GAD7 TOTAL SCORE: 21

## 2022-01-17 RX ORDER — FOLIC ACID 1 MG/1
1 TABLET ORAL DAILY
Qty: 30 TABLET | Refills: 1 | Status: ON HOLD | OUTPATIENT
Start: 2022-01-17 | End: 2022-04-04

## 2022-02-08 ENCOUNTER — DOCUMENTATION ONLY (OUTPATIENT)
Dept: PSYCHIATRY | Facility: CLINIC | Age: 37
End: 2022-02-08
Payer: COMMERCIAL

## 2022-02-09 NOTE — PROGRESS NOTES
Patient late vahid for initial [facility] assessment.  He will call to reschedule    Dalila Sorto, MSN, APRN, CNP, [facility],   [facility]

## 2022-03-10 ENCOUNTER — HOSPITAL ENCOUNTER (EMERGENCY)
Facility: CLINIC | Age: 37
Discharge: HOME | End: 2022-03-10
Attending: EMERGENCY MEDICINE | Admitting: EMERGENCY MEDICINE
Payer: COMMERCIAL

## 2022-03-10 ENCOUNTER — TRANSFERRED RECORDS (OUTPATIENT)
Dept: HEALTH INFORMATION MANAGEMENT | Facility: CLINIC | Age: 37
End: 2022-03-10
Payer: COMMERCIAL

## 2022-03-10 VITALS
OXYGEN SATURATION: 95 % | RESPIRATION RATE: 16 BRPM | DIASTOLIC BLOOD PRESSURE: 107 MMHG | HEART RATE: 85 BPM | WEIGHT: 245 LBS | BODY MASS INDEX: 34.3 KG/M2 | TEMPERATURE: 98.1 F | SYSTOLIC BLOOD PRESSURE: 165 MMHG

## 2022-03-10 DIAGNOSIS — F10.10 ALCOHOL ABUSE: ICD-10-CM

## 2022-03-10 LAB — ALCOHOL BREATH TEST: 0.27 (ref 0–0.01)

## 2022-03-10 PROCEDURE — 99283 EMERGENCY DEPT VISIT LOW MDM: CPT | Performed by: EMERGENCY MEDICINE

## 2022-03-10 PROCEDURE — 99282 EMERGENCY DEPT VISIT SF MDM: CPT | Performed by: EMERGENCY MEDICINE

## 2022-03-10 PROCEDURE — 250N000013 HC RX MED GY IP 250 OP 250 PS 637: Performed by: EMERGENCY MEDICINE

## 2022-03-10 PROCEDURE — 82075 ASSAY OF BREATH ETHANOL: CPT | Performed by: EMERGENCY MEDICINE

## 2022-03-10 RX ORDER — NICOTINE 21 MG/24HR
1 PATCH, TRANSDERMAL 24 HOURS TRANSDERMAL ONCE
Status: DISCONTINUED | OUTPATIENT
Start: 2022-03-10 | End: 2022-03-11 | Stop reason: HOSPADM

## 2022-03-10 RX ADMIN — NICOTINE 1 PATCH: 21 PATCH, EXTENDED RELEASE TRANSDERMAL at 21:30

## 2022-03-11 ENCOUNTER — HOSPITAL ENCOUNTER (INPATIENT)
Facility: CLINIC | Age: 37
LOS: 1 days | Discharge: ANOTHER HOSPITAL | End: 2022-03-13
Attending: FAMILY MEDICINE | Admitting: PSYCHIATRY & NEUROLOGY
Payer: COMMERCIAL

## 2022-03-11 ENCOUNTER — PATIENT OUTREACH (OUTPATIENT)
Dept: CARE COORDINATION | Facility: CLINIC | Age: 37
End: 2022-03-11
Payer: COMMERCIAL

## 2022-03-11 DIAGNOSIS — Z20.822 COVID-19 RULED OUT BY LABORATORY TESTING: ICD-10-CM

## 2022-03-11 DIAGNOSIS — Z71.89 OTHER SPECIFIED COUNSELING: ICD-10-CM

## 2022-03-11 DIAGNOSIS — F10.29 ALCOHOL DEPENDENCE WITH UNSPECIFIED ALCOHOL-INDUCED DISORDER (H): ICD-10-CM

## 2022-03-11 LAB
ALBUMIN SERPL-MCNC: 4.2 G/DL (ref 3.4–5)
ALCOHOL BREATH TEST: 0.26 (ref 0–0.01)
ALP SERPL-CCNC: 82 U/L (ref 40–150)
ALT SERPL W P-5'-P-CCNC: 263 U/L (ref 0–70)
AMPHETAMINES UR QL SCN: ABNORMAL
ANION GAP SERPL CALCULATED.3IONS-SCNC: 10 MMOL/L (ref 3–14)
AST SERPL W P-5'-P-CCNC: 364 U/L (ref 0–45)
BARBITURATES UR QL: ABNORMAL
BASOPHILS # BLD AUTO: 0.1 10E3/UL (ref 0–0.2)
BASOPHILS NFR BLD AUTO: 1 %
BENZODIAZ UR QL: ABNORMAL
BILIRUB SERPL-MCNC: 0.8 MG/DL (ref 0.2–1.3)
BUN SERPL-MCNC: 13 MG/DL (ref 7–30)
CALCIUM SERPL-MCNC: 9.4 MG/DL (ref 8.5–10.1)
CANNABINOIDS UR QL SCN: ABNORMAL
CHLORIDE BLD-SCNC: 100 MMOL/L (ref 94–109)
CO2 SERPL-SCNC: 30 MMOL/L (ref 20–32)
COCAINE UR QL: ABNORMAL
CREAT SERPL-MCNC: 0.79 MG/DL (ref 0.66–1.25)
EOSINOPHIL # BLD AUTO: 0.1 10E3/UL (ref 0–0.7)
EOSINOPHIL NFR BLD AUTO: 1 %
ERYTHROCYTE [DISTWIDTH] IN BLOOD BY AUTOMATED COUNT: 13.6 % (ref 10–15)
ETHANOL SERPL-MCNC: 0.33 G/DL
GFR SERPL CREATININE-BSD FRML MDRD: >90 ML/MIN/1.73M2
GLUCOSE BLD-MCNC: 175 MG/DL (ref 70–99)
HCT VFR BLD AUTO: 43.1 % (ref 40–53)
HGB BLD-MCNC: 15 G/DL (ref 13.3–17.7)
IMM GRANULOCYTES # BLD: 0 10E3/UL
IMM GRANULOCYTES NFR BLD: 0 %
LIPASE SERPL-CCNC: 100 U/L (ref 73–393)
LYMPHOCYTES # BLD AUTO: 1.2 10E3/UL (ref 0.8–5.3)
LYMPHOCYTES NFR BLD AUTO: 16 %
MCH RBC QN AUTO: 33.5 PG (ref 26.5–33)
MCHC RBC AUTO-ENTMCNC: 34.8 G/DL (ref 31.5–36.5)
MCV RBC AUTO: 96 FL (ref 78–100)
MONOCYTES # BLD AUTO: 0.9 10E3/UL (ref 0–1.3)
MONOCYTES NFR BLD AUTO: 12 %
NEUTROPHILS # BLD AUTO: 5 10E3/UL (ref 1.6–8.3)
NEUTROPHILS NFR BLD AUTO: 70 %
NRBC # BLD AUTO: 0 10E3/UL
NRBC BLD AUTO-RTO: 0 /100
OPIATES UR QL SCN: ABNORMAL
PLATELET # BLD AUTO: 223 10E3/UL (ref 150–450)
POTASSIUM BLD-SCNC: 3.1 MMOL/L (ref 3.4–5.3)
PROT SERPL-MCNC: 7.7 G/DL (ref 6.8–8.8)
RBC # BLD AUTO: 4.48 10E6/UL (ref 4.4–5.9)
SARS-COV-2 RNA RESP QL NAA+PROBE: NEGATIVE
SODIUM SERPL-SCNC: 140 MMOL/L (ref 133–144)
WBC # BLD AUTO: 7.3 10E3/UL (ref 4–11)

## 2022-03-11 PROCEDURE — 99285 EMERGENCY DEPT VISIT HI MDM: CPT | Performed by: FAMILY MEDICINE

## 2022-03-11 PROCEDURE — 96361 HYDRATE IV INFUSION ADD-ON: CPT | Performed by: FAMILY MEDICINE

## 2022-03-11 PROCEDURE — 82040 ASSAY OF SERUM ALBUMIN: CPT | Performed by: FAMILY MEDICINE

## 2022-03-11 PROCEDURE — 99285 EMERGENCY DEPT VISIT HI MDM: CPT | Mod: 25 | Performed by: FAMILY MEDICINE

## 2022-03-11 PROCEDURE — 250N000013 HC RX MED GY IP 250 OP 250 PS 637: Performed by: FAMILY MEDICINE

## 2022-03-11 PROCEDURE — 80053 COMPREHEN METABOLIC PANEL: CPT | Performed by: FAMILY MEDICINE

## 2022-03-11 PROCEDURE — 36415 COLL VENOUS BLD VENIPUNCTURE: CPT | Performed by: FAMILY MEDICINE

## 2022-03-11 PROCEDURE — 82077 ASSAY SPEC XCP UR&BREATH IA: CPT | Performed by: FAMILY MEDICINE

## 2022-03-11 PROCEDURE — 82075 ASSAY OF BREATH ETHANOL: CPT | Performed by: FAMILY MEDICINE

## 2022-03-11 PROCEDURE — 85025 COMPLETE CBC W/AUTO DIFF WBC: CPT | Performed by: FAMILY MEDICINE

## 2022-03-11 PROCEDURE — 83690 ASSAY OF LIPASE: CPT | Performed by: FAMILY MEDICINE

## 2022-03-11 PROCEDURE — C9803 HOPD COVID-19 SPEC COLLECT: HCPCS | Performed by: FAMILY MEDICINE

## 2022-03-11 PROCEDURE — U0005 INFEC AGEN DETEC AMPLI PROBE: HCPCS | Performed by: FAMILY MEDICINE

## 2022-03-11 PROCEDURE — 96360 HYDRATION IV INFUSION INIT: CPT | Performed by: FAMILY MEDICINE

## 2022-03-11 PROCEDURE — 80307 DRUG TEST PRSMV CHEM ANLYZR: CPT | Performed by: FAMILY MEDICINE

## 2022-03-11 PROCEDURE — 258N000003 HC RX IP 258 OP 636: Performed by: FAMILY MEDICINE

## 2022-03-11 RX ORDER — LORAZEPAM 1 MG/1
1-4 TABLET ORAL EVERY 30 MIN PRN
Status: DISCONTINUED | OUTPATIENT
Start: 2022-03-11 | End: 2022-03-12

## 2022-03-11 RX ORDER — NICOTINE 21 MG/24HR
1 PATCH, TRANSDERMAL 24 HOURS TRANSDERMAL ONCE
Status: DISCONTINUED | OUTPATIENT
Start: 2022-03-11 | End: 2022-03-12

## 2022-03-11 RX ORDER — POTASSIUM CHLORIDE 1.5 G/1.58G
40 POWDER, FOR SOLUTION ORAL ONCE
Status: COMPLETED | OUTPATIENT
Start: 2022-03-11 | End: 2022-03-11

## 2022-03-11 RX ADMIN — POTASSIUM CHLORIDE 40 MEQ: 1.5 FOR SOLUTION ORAL at 15:34

## 2022-03-11 RX ADMIN — LORAZEPAM 2 MG: 1 TABLET ORAL at 20:01

## 2022-03-11 RX ADMIN — SODIUM CHLORIDE 1000 ML: 9 INJECTION, SOLUTION INTRAVENOUS at 18:39

## 2022-03-11 RX ADMIN — LORAZEPAM 2 MG: 1 TABLET ORAL at 18:30

## 2022-03-11 RX ADMIN — NICOTINE 1 PATCH: 21 PATCH, EXTENDED RELEASE TRANSDERMAL at 17:09

## 2022-03-11 RX ADMIN — LORAZEPAM 1 MG: 1 TABLET ORAL at 23:06

## 2022-03-11 ASSESSMENT — ENCOUNTER SYMPTOMS
HALLUCINATIONS: 1
DYSPHORIC MOOD: 1

## 2022-03-11 ASSESSMENT — ACTIVITIES OF DAILY LIVING (ADL)
DRESS: INDEPENDENT
HYGIENE/GROOMING: INDEPENDENT
LAUNDRY: WITH SUPERVISION
ORAL_HYGIENE: INDEPENDENT

## 2022-03-11 NOTE — ED NOTES
Pt complained he is missing $146. Searched by Cesar SMITH. Per Cesar pt had no money. All belongings returned to pt.
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
## 2022-03-11 NOTE — PROGRESS NOTES
Clinic Care Coordination Contact  [facility]: Post-Discharge Note  SITUATION                                                      Admission:    Admission Date: 03/10/22   Reason for Admission: Alcohol Problem  Discharge:   Discharge Date: 03/10/22  Discharge Diagnosis: Alcohol Problem    BACKGROUND                                                      Per hospital discharge summary and inpatient provider notes:    Shahram Young is a 36 year old male with history of  polysubstance abuse, anxiety, depression, BPD, PTSD presenting to the ED seeking alcohol detox.  Patient states he drinks about a liter of whiskey daily.  He recently tried to detox and experienced hallucinations or started drinking again.  Last intake was just prior to arrival.  He also uses cannabis, no other drugs.  No suicidal or homicidal ideation.  No recent illness.  No other symptoms noted.       ASSESSMENT      Enrollment  Primary Care Care Coordination Status: Declined    Discharge Assessment  How are you doing now that you are home?: Patient reports he is doing fine.  He is headed back to the ED to hopefully get admitted for detox. He claims he did not get his money returned to him.  Offered number for Patient Relations but he said he already has it and will call but right now he is focused on detoxing  How are your symptoms? (Red Flag symptoms escalate to triage hotline per guidelines): Improved  Do you feel your condition is stable enough to be safe at home until your provider visit?: No (see comment) (plans to return to the ed to detox)  Does the patient have their discharge instructions? : Yes  Does the patient have questions regarding their discharge instructions? : No  Were you started on any new medications or were there changes to any of your previous medications? : No  Discharge follow-up appointment scheduled within 14 calendar days? : No  Is patient agreeable to assistance with scheduling? : No                  PLAN                                                       Outpatient Plan:     Follow-up care  Follow up with your healthcare provider, or as advised.    No future appointments.      For any urgent concerns, please contact our 24 hour nurse triage line: [phone] ([phone])         MARCUS Pathak  [phone]  [facility]

## 2022-03-12 PROBLEM — F10.29 ALCOHOL DEPENDENCE WITH UNSPECIFIED ALCOHOL-INDUCED DISORDER (H): Status: ACTIVE | Noted: 2022-03-12

## 2022-03-12 LAB — POTASSIUM BLD-SCNC: 3.9 MMOL/L (ref 3.4–5.3)

## 2022-03-12 PROCEDURE — 36415 COLL VENOUS BLD VENIPUNCTURE: CPT | Performed by: PHYSICIAN ASSISTANT

## 2022-03-12 PROCEDURE — 250N000013 HC RX MED GY IP 250 OP 250 PS 637: Performed by: PSYCHIATRY & NEUROLOGY

## 2022-03-12 PROCEDURE — 250N000011 HC RX IP 250 OP 636: Performed by: PSYCHIATRY & NEUROLOGY

## 2022-03-12 PROCEDURE — 250N000013 HC RX MED GY IP 250 OP 250 PS 637: Performed by: CLINICAL NURSE SPECIALIST

## 2022-03-12 PROCEDURE — 99207 PR CONSULT E&M CHANGED TO INITIAL LEVEL: CPT | Performed by: PHYSICIAN ASSISTANT

## 2022-03-12 PROCEDURE — 250N000013 HC RX MED GY IP 250 OP 250 PS 637: Performed by: PHYSICIAN ASSISTANT

## 2022-03-12 PROCEDURE — 128N000004 HC R&B CD ADULT

## 2022-03-12 PROCEDURE — 99222 1ST HOSP IP/OBS MODERATE 55: CPT | Mod: AI | Performed by: CLINICAL NURSE SPECIALIST

## 2022-03-12 PROCEDURE — 99222 1ST HOSP IP/OBS MODERATE 55: CPT | Performed by: PHYSICIAN ASSISTANT

## 2022-03-12 PROCEDURE — 84132 ASSAY OF SERUM POTASSIUM: CPT | Performed by: PHYSICIAN ASSISTANT

## 2022-03-12 RX ORDER — METHADONE HYDROCHLORIDE 5 MG/5ML
125 SOLUTION ORAL DAILY
Status: DISCONTINUED | OUTPATIENT
Start: 2022-03-12 | End: 2022-03-13 | Stop reason: HOSPADM

## 2022-03-12 RX ORDER — OLANZAPINE 10 MG/1
10 TABLET ORAL 3 TIMES DAILY PRN
Status: DISCONTINUED | OUTPATIENT
Start: 2022-03-12 | End: 2022-03-12

## 2022-03-12 RX ORDER — ONDANSETRON 4 MG/1
4 TABLET, FILM COATED ORAL EVERY 6 HOURS PRN
Status: DISCONTINUED | OUTPATIENT
Start: 2022-03-12 | End: 2022-03-13 | Stop reason: HOSPADM

## 2022-03-12 RX ORDER — IBUPROFEN 400 MG/1
400 TABLET, FILM COATED ORAL EVERY 6 HOURS PRN
Status: DISCONTINUED | OUTPATIENT
Start: 2022-03-12 | End: 2022-03-13 | Stop reason: HOSPADM

## 2022-03-12 RX ORDER — HYDROXYZINE HYDROCHLORIDE 25 MG/1
25 TABLET, FILM COATED ORAL EVERY 4 HOURS PRN
Status: DISCONTINUED | OUTPATIENT
Start: 2022-03-12 | End: 2022-03-13 | Stop reason: HOSPADM

## 2022-03-12 RX ORDER — OLANZAPINE 10 MG/2ML
5 INJECTION, POWDER, FOR SOLUTION INTRAMUSCULAR 3 TIMES DAILY PRN
Status: DISCONTINUED | OUTPATIENT
Start: 2022-03-12 | End: 2022-03-13 | Stop reason: HOSPADM

## 2022-03-12 RX ORDER — TRAZODONE HYDROCHLORIDE 50 MG/1
50 TABLET, FILM COATED ORAL
Status: DISCONTINUED | OUTPATIENT
Start: 2022-03-12 | End: 2022-03-13 | Stop reason: HOSPADM

## 2022-03-12 RX ORDER — POLYETHYLENE GLYCOL 3350 17 G/17G
17 POWDER, FOR SOLUTION ORAL DAILY
Status: DISCONTINUED | OUTPATIENT
Start: 2022-03-12 | End: 2022-03-13 | Stop reason: HOSPADM

## 2022-03-12 RX ORDER — AMOXICILLIN 250 MG
1 CAPSULE ORAL 2 TIMES DAILY PRN
Status: DISCONTINUED | OUTPATIENT
Start: 2022-03-12 | End: 2022-03-13 | Stop reason: HOSPADM

## 2022-03-12 RX ORDER — DIAZEPAM 5 MG
5-20 TABLET ORAL EVERY 30 MIN PRN
Status: DISCONTINUED | OUTPATIENT
Start: 2022-03-12 | End: 2022-03-12

## 2022-03-12 RX ORDER — MAGNESIUM HYDROXIDE/ALUMINUM HYDROXICE/SIMETHICONE 120; 1200; 1200 MG/30ML; MG/30ML; MG/30ML
30 SUSPENSION ORAL EVERY 4 HOURS PRN
Status: DISCONTINUED | OUTPATIENT
Start: 2022-03-12 | End: 2022-03-13 | Stop reason: HOSPADM

## 2022-03-12 RX ORDER — NICOTINE 21 MG/24HR
1 PATCH, TRANSDERMAL 24 HOURS TRANSDERMAL DAILY
Status: DISCONTINUED | OUTPATIENT
Start: 2022-03-12 | End: 2022-03-13 | Stop reason: HOSPADM

## 2022-03-12 RX ORDER — MULTIPLE VITAMINS W/ MINERALS TAB 9MG-400MCG
1 TAB ORAL DAILY
Status: DISCONTINUED | OUTPATIENT
Start: 2022-03-12 | End: 2022-03-13 | Stop reason: HOSPADM

## 2022-03-12 RX ORDER — LISINOPRIL 10 MG/1
10 TABLET ORAL DAILY
Status: DISCONTINUED | OUTPATIENT
Start: 2022-03-12 | End: 2022-03-13 | Stop reason: HOSPADM

## 2022-03-12 RX ORDER — NALOXONE HYDROCHLORIDE 0.4 MG/ML
0.4 INJECTION, SOLUTION INTRAMUSCULAR; INTRAVENOUS; SUBCUTANEOUS
Status: DISCONTINUED | OUTPATIENT
Start: 2022-03-12 | End: 2022-03-13 | Stop reason: HOSPADM

## 2022-03-12 RX ORDER — FOLIC ACID 1 MG/1
1 TABLET ORAL DAILY
Status: DISCONTINUED | OUTPATIENT
Start: 2022-03-12 | End: 2022-03-13 | Stop reason: HOSPADM

## 2022-03-12 RX ORDER — OLANZAPINE 10 MG/2ML
10 INJECTION, POWDER, FOR SOLUTION INTRAMUSCULAR 3 TIMES DAILY PRN
Status: DISCONTINUED | OUTPATIENT
Start: 2022-03-12 | End: 2022-03-12

## 2022-03-12 RX ORDER — OLANZAPINE 10 MG/2ML
5 INJECTION, POWDER, FOR SOLUTION INTRAMUSCULAR 3 TIMES DAILY PRN
Status: DISCONTINUED | OUTPATIENT
Start: 2022-03-12 | End: 2022-03-12

## 2022-03-12 RX ORDER — POLYETHYLENE GLYCOL 3350 17 G
2-4 POWDER IN PACKET (EA) ORAL
Status: DISCONTINUED | OUTPATIENT
Start: 2022-03-12 | End: 2022-03-13 | Stop reason: HOSPADM

## 2022-03-12 RX ORDER — NALOXONE HYDROCHLORIDE 0.4 MG/ML
0.2 INJECTION, SOLUTION INTRAMUSCULAR; INTRAVENOUS; SUBCUTANEOUS
Status: DISCONTINUED | OUTPATIENT
Start: 2022-03-12 | End: 2022-03-13 | Stop reason: HOSPADM

## 2022-03-12 RX ORDER — BENZOCAINE/MENTHOL 6 MG-10 MG
LOZENGE MUCOUS MEMBRANE 2 TIMES DAILY PRN
Status: DISCONTINUED | OUTPATIENT
Start: 2022-03-12 | End: 2022-03-13 | Stop reason: HOSPADM

## 2022-03-12 RX ORDER — OLANZAPINE 2.5 MG/1
2.5-5 TABLET, FILM COATED ORAL 3 TIMES DAILY PRN
Status: DISCONTINUED | OUTPATIENT
Start: 2022-03-12 | End: 2022-03-13 | Stop reason: HOSPADM

## 2022-03-12 RX ORDER — ATENOLOL 50 MG/1
50 TABLET ORAL DAILY PRN
Status: DISCONTINUED | OUTPATIENT
Start: 2022-03-12 | End: 2022-03-13 | Stop reason: HOSPADM

## 2022-03-12 RX ORDER — IBUPROFEN 200 MG
200 TABLET ORAL EVERY 6 HOURS PRN
Status: DISCONTINUED | OUTPATIENT
Start: 2022-03-12 | End: 2022-03-12

## 2022-03-12 RX ORDER — LORAZEPAM 1 MG/1
1-4 TABLET ORAL EVERY 30 MIN PRN
Status: DISCONTINUED | OUTPATIENT
Start: 2022-03-12 | End: 2022-03-13

## 2022-03-12 RX ORDER — ACETAMINOPHEN 325 MG/1
650 TABLET ORAL EVERY 4 HOURS PRN
Status: DISCONTINUED | OUTPATIENT
Start: 2022-03-12 | End: 2022-03-12

## 2022-03-12 RX ADMIN — LISINOPRIL 10 MG: 10 TABLET ORAL at 09:31

## 2022-03-12 RX ADMIN — OLANZAPINE 5 MG: 2.5 TABLET, FILM COATED ORAL at 16:30

## 2022-03-12 RX ADMIN — THIAMINE HCL TAB 100 MG 100 MG: 100 TAB at 08:17

## 2022-03-12 RX ADMIN — NICOTINE POLACRILEX 4 MG: 2 LOZENGE ORAL at 20:14

## 2022-03-12 RX ADMIN — HYDROXYZINE HYDROCHLORIDE 25 MG: 25 TABLET, FILM COATED ORAL at 01:19

## 2022-03-12 RX ADMIN — METHADONE HYDROCHLORIDE 125 MG: 5 SOLUTION ORAL at 09:52

## 2022-03-12 RX ADMIN — FOLIC ACID 1 MG: 1 TABLET ORAL at 08:17

## 2022-03-12 RX ADMIN — TRAZODONE HYDROCHLORIDE 50 MG: 50 TABLET ORAL at 23:05

## 2022-03-12 RX ADMIN — HYDROXYZINE HYDROCHLORIDE 25 MG: 25 TABLET, FILM COATED ORAL at 20:14

## 2022-03-12 RX ADMIN — HYDROXYZINE HYDROCHLORIDE 25 MG: 25 TABLET, FILM COATED ORAL at 08:17

## 2022-03-12 RX ADMIN — OLANZAPINE 5 MG: 2.5 TABLET, FILM COATED ORAL at 20:59

## 2022-03-12 RX ADMIN — FLUOXETINE 20 MG: 20 CAPSULE ORAL at 10:28

## 2022-03-12 RX ADMIN — HYDROXYZINE HYDROCHLORIDE 25 MG: 25 TABLET, FILM COATED ORAL at 12:18

## 2022-03-12 RX ADMIN — TRAZODONE HYDROCHLORIDE 50 MG: 50 TABLET ORAL at 01:19

## 2022-03-12 RX ADMIN — LORAZEPAM 2 MG: 1 TABLET ORAL at 16:30

## 2022-03-12 RX ADMIN — NICOTINE 1 PATCH: 21 PATCH, EXTENDED RELEASE TRANSDERMAL at 09:31

## 2022-03-12 RX ADMIN — TRAZODONE HYDROCHLORIDE 50 MG: 50 TABLET ORAL at 20:59

## 2022-03-12 RX ADMIN — IBUPROFEN 400 MG: 400 TABLET, FILM COATED ORAL at 23:05

## 2022-03-12 RX ADMIN — ONDANSETRON HYDROCHLORIDE 4 MG: 4 TABLET, FILM COATED ORAL at 03:48

## 2022-03-12 RX ADMIN — MULTIPLE VITAMINS W/ MINERALS TAB 1 TABLET: TAB at 08:17

## 2022-03-12 RX ADMIN — DIAZEPAM 10 MG: 5 TABLET ORAL at 03:48

## 2022-03-12 RX ADMIN — HYDROCORTISONE: 1 CREAM TOPICAL at 12:20

## 2022-03-12 RX ADMIN — POLYETHYLENE GLYCOL 3350 17 G: 17 POWDER, FOR SOLUTION ORAL at 12:38

## 2022-03-12 RX ADMIN — IBUPROFEN 400 MG: 400 TABLET, FILM COATED ORAL at 16:30

## 2022-03-12 ASSESSMENT — ACTIVITIES OF DAILY LIVING (ADL)
HYGIENE/GROOMING: INDEPENDENT
DRESS: INDEPENDENT
DRESS: INDEPENDENT
HYGIENE/GROOMING: INDEPENDENT
ORAL_HYGIENE: INDEPENDENT
LAUNDRY: WITH SUPERVISION
ORAL_HYGIENE: INDEPENDENT
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
## 2022-03-12 NOTE — PROGRESS NOTES
03/11/22 8826   Patient Belongings   Did you bring any home meds/supplements to the hospital?  Yes   Disposition of meds  Other (see comment)   Patient Belongings other (see comments)   Patient Belongings Put in Hospital Secure Location (Security or Locker, etc.) cash/credit card;cell phone/electronics;clothing;medication(s);wallet;suitcase;shoes   Belongings Search Yes   Clothing Search Yes   Second Staff Dru ROSALES and Johny   Comment Medication given to nurse, items searched   MED BIN: Lighters (x2), wallet, cellphone and ciggs   SECURITY: #[id number]  Visa....[id number]  Visa....[id number]  Master card ....[id number]  [employer].... [id number]    BIN:   Jacket, sweatpants, socks (x13), sneakers, jeans and belt, twizzlers, floss, body wash, tooth paste and tooth brush (x2), Deoderant (x2), pants and belt, sweatpants, tshirt (x4), long sleeve (x2), notebook, cord, underwear (x7), mask, shorts and lock box (unable to unlock) pt stated its ciggs- RN oked.     A               Admission:  I am responsible for any personal items that are not sent to the safe or pharmacy.  [facility] is not responsible for loss, theft or damage of any property in my possession.    Signature:  _________________________________ Date: _______  Time: _____                                              Staff Signature:  ____________________________ Date: ________  Time: _____      2nd Staff person, if patient is unable/unwilling to sign:    Signature: ________________________________ Date: ________  Time: _____     Discharge:  [facility] has returned all of my personal belongings:    Signature: _________________________________ Date: ________  Time: _____                                          Staff Signature:  ____________________________ Date: ________  Time: _____

## 2022-03-12 NOTE — PROVIDER NOTIFICATION
"Had patient sign ISABEL for [facility]. Verified Methadone dose as 125 mg daily, last dose 3/11/2022 with \"Astrid.\" Paged on call provider per Bob \"[facility] [employer],\" have not yet received return call.       "

## 2022-03-12 NOTE — H&P
"Admitted: 03/11/2022    CHIEF COMPLAINT:  Seeking detox from alcohol.    IDENTIFYING INFORMATION:  Shahram Young is a 36-year-old  male seeking detox from alcohol.    HISTORY OF PRESENT ILLNESS:  Shahram Yuong is a 36-year-old  male with a history of polysubstance abuse, depression, anxiety, PTSD, and is seeking detox from alcohol.  The patient reports that he has been drinking a liter of alcohol daily during the winter months.  The patient states, \"I hate winter.\"  The patient reports prior to coming to the hospital, he tried to \"go cold turkey.\" The patient states that for 3 days, he started hallucinating.  He was hearing things.  The patient reports he then thought he needed to come into the hospital.  The patient also is on methadone 125 mg.  The patient reports he has been on methadone for the past 5 years.  The patient denies seizure history.  The patient is displaying hand tremors.    The patient has tolerance, withdrawal, progressive use, loss of control, spending more time using alcohol, and spending more time than intended using alcohol.  The patient has made attempts to quit, experiencing cravings and negative consequences.    Provider reviewed admission labs with the patient.  Goal for this hospitalization is detox from alcohol.    PSYCHIATRIC REVIEW OF SYSTEMS:  The patient reports that he is depressed.  The patient denies suicidal ideation.  The patient denies homicidal ideation.  The patient does not endorse any symptoms of sheng.  Does not endorse any symptoms of psychosis, including auditory or visual hallucinations or feelings of paranoia.  The patient reports anxiety is very high.  The patient has a PTSD history.  The patient denies any symptoms at this time.  The patient denies any symptoms corresponding to eating disorder or OCD.    PSYCHIATRIC HISTORY:  The patient reports being hospitalized for suicidal ideation as a young adult.  The patient denies any suicide attempts.  The " patient denies any self-injurious behavior history.    PAST MEDICAL HISTORY:  The patient reports chronic pain.  Reviewed admission labs.  Potassium level 3.1, replaced in the ED, 40 mEq.  , , both significantly elevated.    ALLERGIES:  PENICILLIN, SULFA DRUGS.    SUBSTANCE ABUSE HISTORY:  The patient is positive for cannabinoids.  Alcohol 0.263.  The patient reports he is a daily user of cannabis.  The patient states it helps with a lot of things, especially his pain.  The patient denies any gambling history.  The patient denies any other drug use.  The patient reports he was in detox about 1 year ago.    FAMILY HISTORY:  The patient reports maternal side of his family endorsed alcohol use disorder.  The patient reports father endorses depression.    SOCIAL HISTORY:  The patient is homeless.  He is single and unemployed.    MEDICAL REVIEW OF SYSTEMS:  A complete review of systems was performed with pertinent positives and negatives noted in the HPI, and all other systems negative as documented by Justen Garcia MD, dated 03/10/2022.  No changes noted.    PHYSICAL EXAMINATION:  VITAL SIGNS:  Blood pressure 140/107, pulse 95, temperature 98.6 Fahrenheit, respirations 16, weight 245 pounds, SpO2 at 95%.  Reviewed documentation for physical examination completed by Justen Garcia MD, dated 03/10/2022.  No changes noted.    MENTAL STATUS EXAMINATION:  The patient appears his stated age.  He is disheveled.  He is wearing scrubs.  The patient was cooperative in meeting with provider in the interview room.  He was calm and cooperative throughout the interview.  Eye contact is adequate.  The patient displayed hand tremors.  Speech was spontaneous, very soft in volume.  He elaborated appropriately.  Mood is described as depressed.  Affect:  Blunted, congruent.  Thought process:  Linear and logical.  Associations: Intact.  Thought content: Did not display any evidence of psychosis.  He denies passive suicidal  thinking.  No active intent.  He denies homicidal thinking.  Insight and judgment appear to be fair.  Cognition appears intact to interviewing including orientation to person, place, time, and situation, use of language, and fund of knowledge.  Recent and remote memory are grossly intact.  Muscle strength, tone, and gait appeared to be within normal upon observation.    ASSESSMENT:  1.  Alcohol use disorder, severe.  2.  Alcohol withdrawal, severe, complicated with hypertension and possible DT's.  3.  Opiate use disorder, severe, methadone maintenance.  4.  History of major depressive disorder, severe, without psychosis.  5.  History of posttraumatic stress disorder.  6.  History of anxiety disorder, unspecified.  7.  Nicotine use disorder, severe.    PLAN:  1.  The patient has been admitted to Detox Unit 3A on a voluntary basis.  2.  Discussed medications with the patient.  Provider discussed fluoxetine or other options to treat depression.  The patient decided he wanted to remain on fluoxetine 20 mg to treat both depressive and anxiety symptoms.  We discussed risks, benefits, and side effects of medication with the patient.  3.  The patient will continue on methadone 125 mg as methadone maintenance.  4.  The patient is placed on MSSA protocol.  The patient will be using lorazepam due to elevated liver enzymes.  5.  Psychosocial treatments to be addressed with CTC.  6.  Estimated length of stay 3 to 5 days.    Debra A. Naegele, APRN, CNS        D: 2022   T: 2022   MT: manuel    Name:     GAY MILNER  MRN:      [id number]        Account:     [id number]   :      1985           Admitted:    2022       Document: [id number]

## 2022-03-12 NOTE — PROVIDER NOTIFICATION
"Pt on PTA lisinopril, still waiting on return from [facility] as this was not ordered on admission.   BP (!) 163/126   Pulse 92   Temp 97.7  F (36.5  C) (Temporal)   Resp 16   Ht 1.803 m (5' 11\")   Wt 111.1 kg (245 lb)   SpO2 92%   BMI 34.17 kg/m      Paged internal medicine and requested order to start lisinopril ASAP.  "

## 2022-03-12 NOTE — PROGRESS NOTES
SPIRITUAL HEALTH SERVICES  SPIRITUAL ASSESSMENT Progress Note  [facility] ([facility]) [facility]   ON-CALL    REFERRAL SOURCE: Admission request    Spoke with pt's nurse and determined that Sunday or Monday would be a better day for a visit.     PLAN: Will communicate request to spiritual care team. .Spiritual health services remains available for any follow-up or requests    Brigida Tenorio MTS  Associate   Pager: [phone]

## 2022-03-12 NOTE — ED NOTES
ED to Behavioral Floor Handoff    SITUATION  Shahram Young is a 36 year old male who speaks English and lives in a home with others The patient arrived in the ED by private car from home with a complaint of Alcohol Problem  .The patient's current symptoms started/worsened 6 day(s) ago and during this time the symptoms have stayed the same   In the ED, pt was diagnosed with   Final diagnoses:   Alcohol dependence with unspecified alcohol-induced disorder (H)        Initial vitals were: BP: (!) 143/79  Pulse: 89  Temp: 98.8  F (37.1  C)  Resp: 18  SpO2: 96 %   --------  Is the patient diabetic? No   If yes, last blood glucose? --     If yes, was this treated in the ED? --  --------  Is the patient inebriated (ETOH) Yes or Impaired on other substances? Yes  MSSA done? Yes  Last MSSA score: --    Were withdrawal symptoms treated? yes  Does the patient have a seizure history? No. If yes, date of most recent seizure--  --------  Is the patient patient experiencing suicidal ideation? denies current or recent suicidal ideation     Homicidal ideation? denies current or recent homicidal ideation or behaviors.    Self-injurious behavior/urges? denies current or recent self injurious behavior or ideation.  ------  Was pt aggressive in the ED No  Was a code called No  Is the pt now cooperative? Yes  -------  Meds given in ED:   Medications   nicotine (NICODERM CQ) 21 MG/24HR 24 hr patch 1 patch (1 patch Transdermal Patch/Med Applied 3/11/22 1709)   LORazepam (ATIVAN) tablet 1-4 mg (2 mg Oral Given 3/11/22 2001)   potassium chloride (KLOR-CON) Packet 40 mEq (40 mEq Oral Given 3/11/22 1024)   0.9% sodium chloride BOLUS (0 mLs Intravenous Stopped 3/11/22 2148)      Family present during ED course? No  Family currently present? No    BACKGROUND  Does the patient have a cognitive impairment or developmental disability? No  Allergies:   Allergies   Allergen Reactions     Pcn [Penicillins]      Possibly rash noted-- unsure     Pcn  [Penicillins]      Sulfa Drugs      Possibly rash - uncertain     Sulfa Drugs    .   Social demographics are   Social History     Socioeconomic History     Marital status: Single     Spouse name: None     Number of children: None     Years of education: None     Highest education level: None   Occupational History     Comment: Student [employer]     Occupation: [employer]     Employer: [employer]   Tobacco Use     Smoking status: Current Every Day Smoker     Packs/day: 1.00     Years: 10.00     Pack years: 10.00     Types: Cigarettes     Smokeless tobacco: Former User     Tobacco comment: Planning use of Nicotrol for stopping cigarettes   Substance and Sexual Activity     Alcohol use: Yes     Comment: Pt has been drinking 1L whiskey per day     Drug use: Yes     Frequency: 7.0 times per week     Types: Marijuana     Comment: usually has 1-2 hits at night to sleep     Sexual activity: Yes     Partners: Female     Birth control/protection: Pill   Other Topics Concern     Parent/sibling w/ CABG, MI or angioplasty before 65F 55M? Not Asked      Service No     Blood Transfusions No     Caffeine Concern No     Occupational Exposure No     Hobby Hazards Yes     Comment: motorcycle and snow boarding      Sleep Concern No     Stress Concern No     Weight Concern No     Special Diet No     Comment: watches what he eats      Back Care Yes     Comment: currently seeing chiro      Exercise Yes     Bike Helmet Yes     Seat Belt Yes     Self-Exams Yes   Social History Narrative    ** Merged History Encounter **          Social Determinants of Health     Financial Resource Strain: Not on file   Food Insecurity: Not on file   Transportation Needs: Not on file   Physical Activity: Not on file   Stress: Not on file   Social Connections: Not on file   Intimate Partner Violence: Not on file   Housing Stability: Not on file        ASSESSMENT  Labs results   Labs Ordered and Resulted from Time of ED Arrival to Time of ED Departure    COMPREHENSIVE METABOLIC PANEL - Abnormal       Result Value    Sodium 140      Potassium 3.1 (*)     Chloride 100      Carbon Dioxide (CO2) 30      Anion Gap 10      Urea Nitrogen 13      Creatinine 0.79      Calcium 9.4      Glucose 175 (*)     Alkaline Phosphatase 82       (*)      (*)     Protein Total 7.7      Albumin 4.2      Bilirubin Total 0.8      GFR Estimate >90     ETHYL ALCOHOL LEVEL - Abnormal    Alcohol ethyl 0.33 (*)    CBC WITH PLATELETS AND DIFFERENTIAL - Abnormal    WBC Count 7.3      RBC Count 4.48      Hemoglobin 15.0      Hematocrit 43.1      MCV 96      MCH 33.5 (*)     MCHC 34.8      RDW 13.6      Platelet Count 223      % Neutrophils 70      % Lymphocytes 16      % Monocytes 12      % Eosinophils 1      % Basophils 1      % Immature Granulocytes 0      NRBCs per 100 WBC 0      Absolute Neutrophils 5.0      Absolute Lymphocytes 1.2      Absolute Monocytes 0.9      Absolute Eosinophils 0.1      Absolute Basophils 0.1      Absolute Immature Granulocytes 0.0      Absolute NRBCs 0.0     DRUG ABUSE SCREEN 1 URINE (ED) - Abnormal    Amphetamines Urine Screen Negative      Barbiturates Urine Screen Negative      Benzodiazepines Urine Screen Negative      Cannabinoids Urine Screen Positive (*)     Cocaine Urine Screen Negative      Opiates Urine Screen Negative     ALCOHOL BREATH TEST POCT - Abnormal    Alcohol Breath Test 0.263 (*)    LIPASE - Normal    Lipase 100     COVID-19 VIRUS (CORONAVIRUS) BY PCR - Normal    SARS CoV2 PCR Negative        Imaging Studies: No results found for this or any previous visit (from the past 24 hour(s)).   Most recent vital signs BP (!) 142/84   Pulse 78   Temp 98.7  F (37.1  C)   Resp 14   SpO2 99%    Abnormal labs/tests/findings requiring intervention:---   Pain control: pt had none  Nausea control: pt had none    RECOMMENDATION  Are any infection precautions needed (MRSA, VRE, etc.)? No If yes, what infection? --  ---  Does the patient have  mobility issues? independently. If yes, what device does the pt use? ---  ---  Is patient on 72 hour hold or commitment? No If on 72 hour hold, have hold and rights been given to patient? N/A  Are admitting orders written if after 10 p.m. ?N/A  Tasks needing to be completed:---     Sada Lopez RN   [facility]   [phone] [place] [facility]   [phone] [facility] ED

## 2022-03-12 NOTE — PLAN OF CARE
Goal Outcome Evaluation:    Patient had trouble sleeping; trazodone 50mg and vistaril 25mg was administered.    MSSA=8; 1mg of ativan was administered. Ativan was changed to valium. MSSA=10; 10mg  valium was administered.  BP was elevated at 152/100.     We'll continue to monitor.
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
## 2022-03-12 NOTE — PLAN OF CARE
"Goal Outcome Evaluation:    Plan of Care Reviewed With: patient        Pt admitted for alcohol withdrawal, MSSA 4 & 3 not requiring medications for withdrawal on this shift.  He was given his methadone maintenance dose and lisinopril was restarted and his vital signs have improved. BP (!) 153/100   Pulse 65   Temp 97.7  F (36.5  C) (Temporal)   Resp 16   Ht 1.803 m (5' 11\")   Wt 111.1 kg (245 lb)   SpO2 92%   BMI 34.17 kg/m  He reports anxiety 4/10, denies depression or SI. He was given vistaril X 2 for anxiety and reported it was helpful.  He was asking for IV fluids, he is eating and drinking well and labs are WNL.  I discussed with internal medicine and IV fluids are not warranted and this was discussed with patient.  OK to given Gatorade supplement per patient request.  He has been visible off on the unit, he did not attend groups and not social with peers.  He is hoping to get set up with [facility] and housing as he is homeless.   "

## 2022-03-13 ENCOUNTER — HOSPITAL ENCOUNTER (INPATIENT)
Facility: CLINIC | Age: 37
LOS: 4 days | Discharge: HOME | End: 2022-03-17
Attending: INTERNAL MEDICINE | Admitting: INTERNAL MEDICINE
Payer: COMMERCIAL

## 2022-03-13 VITALS
WEIGHT: 245 LBS | HEIGHT: 71 IN | BODY MASS INDEX: 34.3 KG/M2 | DIASTOLIC BLOOD PRESSURE: 112 MMHG | TEMPERATURE: 97.4 F | SYSTOLIC BLOOD PRESSURE: 162 MMHG | OXYGEN SATURATION: 96 % | RESPIRATION RATE: 18 BRPM | HEART RATE: 97 BPM

## 2022-03-13 DIAGNOSIS — F10.930 ALCOHOL WITHDRAWAL SYNDROME WITHOUT COMPLICATION (H): ICD-10-CM

## 2022-03-13 DIAGNOSIS — F41.9 ANXIETY DISORDER, UNSPECIFIED TYPE: Primary | ICD-10-CM

## 2022-03-13 DIAGNOSIS — F10.29 ALCOHOL DEPENDENCE WITH UNSPECIFIED ALCOHOL-INDUCED DISORDER (H): ICD-10-CM

## 2022-03-13 PROBLEM — F10.939 ALCOHOL WITHDRAWAL (H): Status: ACTIVE | Noted: 2022-03-13

## 2022-03-13 LAB
ALBUMIN SERPL-MCNC: 3.7 G/DL (ref 3.4–5)
ALP SERPL-CCNC: 70 U/L (ref 40–150)
ALT SERPL W P-5'-P-CCNC: 180 U/L (ref 0–70)
ANION GAP SERPL CALCULATED.3IONS-SCNC: 10 MMOL/L (ref 3–14)
AST SERPL W P-5'-P-CCNC: 145 U/L (ref 0–45)
BILIRUB DIRECT SERPL-MCNC: 0.3 MG/DL (ref 0–0.2)
BILIRUB SERPL-MCNC: 1 MG/DL (ref 0.2–1.3)
BUN SERPL-MCNC: 15 MG/DL (ref 7–30)
CALCIUM SERPL-MCNC: 10.2 MG/DL (ref 8.5–10.1)
CHLORIDE BLD-SCNC: 102 MMOL/L (ref 94–109)
CO2 SERPL-SCNC: 24 MMOL/L (ref 20–32)
CREAT SERPL-MCNC: 0.67 MG/DL (ref 0.66–1.25)
GFR SERPL CREATININE-BSD FRML MDRD: >90 ML/MIN/1.73M2
GLUCOSE BLD-MCNC: 95 MG/DL (ref 70–99)
GLUCOSE BLDC GLUCOMTR-MCNC: 102 MG/DL (ref 70–99)
HOLD SPECIMEN: NORMAL
MAGNESIUM SERPL-MCNC: 1.6 MG/DL (ref 1.6–2.3)
MAGNESIUM SERPL-MCNC: 1.7 MG/DL (ref 1.6–2.3)
PHOSPHATE SERPL-MCNC: 3.7 MG/DL (ref 2.5–4.5)
POTASSIUM BLD-SCNC: 3.9 MMOL/L (ref 3.4–5.3)
POTASSIUM BLD-SCNC: 4.1 MMOL/L (ref 3.4–5.3)
PROT SERPL-MCNC: 7 G/DL (ref 6.8–8.8)
SODIUM SERPL-SCNC: 136 MMOL/L (ref 133–144)

## 2022-03-13 PROCEDURE — 250N000013 HC RX MED GY IP 250 OP 250 PS 637: Performed by: PHYSICIAN ASSISTANT

## 2022-03-13 PROCEDURE — 120N000002 HC R&B MED SURG/OB UMMC

## 2022-03-13 PROCEDURE — 36415 COLL VENOUS BLD VENIPUNCTURE: CPT | Performed by: PHYSICIAN ASSISTANT

## 2022-03-13 PROCEDURE — 82310 ASSAY OF CALCIUM: CPT | Performed by: PHYSICIAN ASSISTANT

## 2022-03-13 PROCEDURE — 82248 BILIRUBIN DIRECT: CPT | Performed by: PHYSICIAN ASSISTANT

## 2022-03-13 PROCEDURE — 99207 PR CDG-DOWN CODE MED NECESSITY: CPT | Performed by: PHYSICIAN ASSISTANT

## 2022-03-13 PROCEDURE — 99222 1ST HOSP IP/OBS MODERATE 55: CPT | Mod: AI | Performed by: INTERNAL MEDICINE

## 2022-03-13 PROCEDURE — 83735 ASSAY OF MAGNESIUM: CPT | Performed by: PHYSICIAN ASSISTANT

## 2022-03-13 PROCEDURE — 250N000011 HC RX IP 250 OP 636: Performed by: PHYSICIAN ASSISTANT

## 2022-03-13 PROCEDURE — 258N000003 HC RX IP 258 OP 636: Performed by: PHYSICIAN ASSISTANT

## 2022-03-13 PROCEDURE — 250N000013 HC RX MED GY IP 250 OP 250 PS 637: Performed by: CLINICAL NURSE SPECIALIST

## 2022-03-13 PROCEDURE — 84100 ASSAY OF PHOSPHORUS: CPT | Performed by: PHYSICIAN ASSISTANT

## 2022-03-13 PROCEDURE — 99239 HOSP IP/OBS DSCHRG MGMT >30: CPT | Performed by: CLINICAL NURSE SPECIALIST

## 2022-03-13 PROCEDURE — HZ2ZZZZ DETOXIFICATION SERVICES FOR SUBSTANCE ABUSE TREATMENT: ICD-10-PCS | Performed by: PSYCHIATRY & NEUROLOGY

## 2022-03-13 PROCEDURE — 84132 ASSAY OF SERUM POTASSIUM: CPT | Performed by: PHYSICIAN ASSISTANT

## 2022-03-13 PROCEDURE — 250N000013 HC RX MED GY IP 250 OP 250 PS 637: Performed by: PSYCHIATRY & NEUROLOGY

## 2022-03-13 PROCEDURE — 99207 PR APP CREDIT; MD BILLING SHARED VISIT: CPT | Performed by: PHYSICIAN ASSISTANT

## 2022-03-13 RX ORDER — DIAZEPAM 10 MG
10 TABLET ORAL EVERY 30 MIN PRN
Status: DISCONTINUED | OUTPATIENT
Start: 2022-03-13 | End: 2022-03-17 | Stop reason: HOSPADM

## 2022-03-13 RX ORDER — CLONIDINE HYDROCHLORIDE 0.1 MG/1
0.1 TABLET ORAL 3 TIMES DAILY PRN
Status: DISCONTINUED | OUTPATIENT
Start: 2022-03-13 | End: 2022-03-17 | Stop reason: HOSPADM

## 2022-03-13 RX ORDER — NALOXONE HYDROCHLORIDE 0.4 MG/ML
0.2 INJECTION, SOLUTION INTRAMUSCULAR; INTRAVENOUS; SUBCUTANEOUS
Status: DISCONTINUED | OUTPATIENT
Start: 2022-03-13 | End: 2022-03-17 | Stop reason: HOSPADM

## 2022-03-13 RX ORDER — HALOPERIDOL 5 MG/ML
2.5-5 INJECTION INTRAMUSCULAR EVERY 6 HOURS PRN
Status: DISCONTINUED | OUTPATIENT
Start: 2022-03-13 | End: 2022-03-17 | Stop reason: HOSPADM

## 2022-03-13 RX ORDER — MULTIPLE VITAMINS W/ MINERALS TAB 9MG-400MCG
1 TAB ORAL DAILY
Status: DISCONTINUED | OUTPATIENT
Start: 2022-03-14 | End: 2022-03-17 | Stop reason: HOSPADM

## 2022-03-13 RX ORDER — FLUMAZENIL 0.1 MG/ML
0.2 INJECTION, SOLUTION INTRAVENOUS
Status: DISCONTINUED | OUTPATIENT
Start: 2022-03-13 | End: 2022-03-17 | Stop reason: HOSPADM

## 2022-03-13 RX ORDER — NALOXONE HYDROCHLORIDE 0.4 MG/ML
0.4 INJECTION, SOLUTION INTRAMUSCULAR; INTRAVENOUS; SUBCUTANEOUS
Status: DISCONTINUED | OUTPATIENT
Start: 2022-03-13 | End: 2022-03-17 | Stop reason: HOSPADM

## 2022-03-13 RX ORDER — ONDANSETRON 2 MG/ML
4 INJECTION INTRAMUSCULAR; INTRAVENOUS EVERY 6 HOURS PRN
Status: DISCONTINUED | OUTPATIENT
Start: 2022-03-13 | End: 2022-03-17 | Stop reason: HOSPADM

## 2022-03-13 RX ORDER — DIAZEPAM 10 MG/2ML
5-10 INJECTION, SOLUTION INTRAMUSCULAR; INTRAVENOUS EVERY 30 MIN PRN
Status: DISCONTINUED | OUTPATIENT
Start: 2022-03-13 | End: 2022-03-17 | Stop reason: HOSPADM

## 2022-03-13 RX ORDER — NICOTINE 21 MG/24HR
1 PATCH, TRANSDERMAL 24 HOURS TRANSDERMAL DAILY
Status: DISCONTINUED | OUTPATIENT
Start: 2022-03-14 | End: 2022-03-17 | Stop reason: HOSPADM

## 2022-03-13 RX ORDER — SODIUM CHLORIDE, SODIUM LACTATE, POTASSIUM CHLORIDE, CALCIUM CHLORIDE 600; 310; 30; 20 MG/100ML; MG/100ML; MG/100ML; MG/100ML
INJECTION, SOLUTION INTRAVENOUS CONTINUOUS
Status: DISCONTINUED | OUTPATIENT
Start: 2022-03-13 | End: 2022-03-16

## 2022-03-13 RX ORDER — POLYETHYLENE GLYCOL 3350 17 G/17G
17 POWDER, FOR SOLUTION ORAL DAILY
Status: DISCONTINUED | OUTPATIENT
Start: 2022-03-14 | End: 2022-03-17 | Stop reason: HOSPADM

## 2022-03-13 RX ORDER — GABAPENTIN 300 MG/1
300 CAPSULE ORAL 3 TIMES DAILY
Status: DISCONTINUED | OUTPATIENT
Start: 2022-03-13 | End: 2022-03-13 | Stop reason: HOSPADM

## 2022-03-13 RX ORDER — GABAPENTIN 300 MG/1
600 CAPSULE ORAL EVERY 8 HOURS
Status: DISCONTINUED | OUTPATIENT
Start: 2022-03-16 | End: 2022-03-16

## 2022-03-13 RX ORDER — NAPROXEN 500 MG/1
500 TABLET ORAL 2 TIMES DAILY PRN
Status: DISCONTINUED | OUTPATIENT
Start: 2022-03-13 | End: 2022-03-13

## 2022-03-13 RX ORDER — OLANZAPINE 5 MG/1
5-10 TABLET, ORALLY DISINTEGRATING ORAL EVERY 6 HOURS PRN
Status: DISCONTINUED | OUTPATIENT
Start: 2022-03-13 | End: 2022-03-17 | Stop reason: HOSPADM

## 2022-03-13 RX ORDER — FOLIC ACID 1 MG/1
1 TABLET ORAL DAILY
Status: CANCELLED | OUTPATIENT
Start: 2022-03-14

## 2022-03-13 RX ORDER — GABAPENTIN 100 MG/1
100 CAPSULE ORAL EVERY 8 HOURS
Status: DISCONTINUED | OUTPATIENT
Start: 2022-03-20 | End: 2022-03-16

## 2022-03-13 RX ORDER — LISINOPRIL 10 MG/1
10 TABLET ORAL DAILY
Status: DISCONTINUED | OUTPATIENT
Start: 2022-03-14 | End: 2022-03-17 | Stop reason: HOSPADM

## 2022-03-13 RX ORDER — LIDOCAINE 40 MG/G
CREAM TOPICAL
Status: DISCONTINUED | OUTPATIENT
Start: 2022-03-13 | End: 2022-03-17 | Stop reason: HOSPADM

## 2022-03-13 RX ORDER — HYDRALAZINE HYDROCHLORIDE 25 MG/1
25 TABLET, FILM COATED ORAL ONCE
Status: COMPLETED | OUTPATIENT
Start: 2022-03-13 | End: 2022-03-13

## 2022-03-13 RX ORDER — GABAPENTIN 600 MG/1
1200 TABLET ORAL ONCE
Status: COMPLETED | OUTPATIENT
Start: 2022-03-13 | End: 2022-03-13

## 2022-03-13 RX ORDER — CLONIDINE HYDROCHLORIDE 0.1 MG/1
0.1 TABLET ORAL 3 TIMES DAILY PRN
Status: DISCONTINUED | OUTPATIENT
Start: 2022-03-13 | End: 2022-03-13 | Stop reason: HOSPADM

## 2022-03-13 RX ORDER — GABAPENTIN 300 MG/1
300 CAPSULE ORAL EVERY 8 HOURS
Status: DISCONTINUED | OUTPATIENT
Start: 2022-03-18 | End: 2022-03-16

## 2022-03-13 RX ORDER — MULTIVITAMIN,THERAPEUTIC
1 TABLET ORAL DAILY
Status: CANCELLED | OUTPATIENT
Start: 2022-03-14

## 2022-03-13 RX ORDER — FOLIC ACID 1 MG/1
1 TABLET ORAL DAILY
Status: DISCONTINUED | OUTPATIENT
Start: 2022-03-14 | End: 2022-03-17 | Stop reason: HOSPADM

## 2022-03-13 RX ORDER — METHADONE HYDROCHLORIDE 5 MG/5ML
125 SOLUTION ORAL DAILY
Status: DISCONTINUED | OUTPATIENT
Start: 2022-03-14 | End: 2022-03-17 | Stop reason: HOSPADM

## 2022-03-13 RX ORDER — NICOTINE 21 MG/24HR
1 PATCH, TRANSDERMAL 24 HOURS TRANSDERMAL DAILY
Status: DISCONTINUED | OUTPATIENT
Start: 2022-03-13 | End: 2022-03-13

## 2022-03-13 RX ORDER — GABAPENTIN 300 MG/1
900 CAPSULE ORAL EVERY 8 HOURS
Status: DISCONTINUED | OUTPATIENT
Start: 2022-03-13 | End: 2022-03-16

## 2022-03-13 RX ORDER — ACETAMINOPHEN 500 MG
500 TABLET ORAL EVERY 6 HOURS PRN
Status: DISCONTINUED | OUTPATIENT
Start: 2022-03-13 | End: 2022-03-17 | Stop reason: HOSPADM

## 2022-03-13 RX ORDER — ONDANSETRON 4 MG/1
4 TABLET, ORALLY DISINTEGRATING ORAL EVERY 6 HOURS PRN
Status: DISCONTINUED | OUTPATIENT
Start: 2022-03-13 | End: 2022-03-17 | Stop reason: HOSPADM

## 2022-03-13 RX ORDER — BENZOCAINE/MENTHOL 6 MG-10 MG
LOZENGE MUCOUS MEMBRANE 2 TIMES DAILY PRN
Status: DISCONTINUED | OUTPATIENT
Start: 2022-03-13 | End: 2022-03-17 | Stop reason: HOSPADM

## 2022-03-13 RX ORDER — DIAZEPAM 5 MG
5-20 TABLET ORAL EVERY 30 MIN PRN
Status: DISCONTINUED | OUTPATIENT
Start: 2022-03-13 | End: 2022-03-13 | Stop reason: HOSPADM

## 2022-03-13 RX ADMIN — OLANZAPINE 5 MG: 2.5 TABLET, FILM COATED ORAL at 01:25

## 2022-03-13 RX ADMIN — HYDROXYZINE HYDROCHLORIDE 25 MG: 25 TABLET, FILM COATED ORAL at 08:19

## 2022-03-13 RX ADMIN — SODIUM CHLORIDE, POTASSIUM CHLORIDE, SODIUM LACTATE AND CALCIUM CHLORIDE: 600; 310; 30; 20 INJECTION, SOLUTION INTRAVENOUS at 16:00

## 2022-03-13 RX ADMIN — METHADONE HYDROCHLORIDE 125 MG: 5 SOLUTION ORAL at 08:33

## 2022-03-13 RX ADMIN — NICOTINE POLACRILEX 4 MG: 2 LOZENGE ORAL at 10:00

## 2022-03-13 RX ADMIN — SODIUM CHLORIDE, POTASSIUM CHLORIDE, SODIUM LACTATE AND CALCIUM CHLORIDE: 600; 310; 30; 20 INJECTION, SOLUTION INTRAVENOUS at 15:59

## 2022-03-13 RX ADMIN — DIAZEPAM 10 MG: 5 INJECTION, SOLUTION INTRAMUSCULAR; INTRAVENOUS at 23:43

## 2022-03-13 RX ADMIN — DIAZEPAM 5 MG: 5 INJECTION, SOLUTION INTRAMUSCULAR; INTRAVENOUS at 15:39

## 2022-03-13 RX ADMIN — GABAPENTIN 300 MG: 300 CAPSULE ORAL at 13:15

## 2022-03-13 RX ADMIN — GABAPENTIN 300 MG: 300 CAPSULE ORAL at 10:00

## 2022-03-13 RX ADMIN — GABAPENTIN 1200 MG: 600 TABLET, FILM COATED ORAL at 15:38

## 2022-03-13 RX ADMIN — LORAZEPAM 2 MG: 1 TABLET ORAL at 04:46

## 2022-03-13 RX ADMIN — GABAPENTIN 900 MG: 300 CAPSULE ORAL at 22:34

## 2022-03-13 RX ADMIN — LORAZEPAM 2 MG: 1 TABLET ORAL at 06:35

## 2022-03-13 RX ADMIN — NICOTINE POLACRILEX 4 MG: 2 LOZENGE ORAL at 08:16

## 2022-03-13 RX ADMIN — THIAMINE HCL TAB 100 MG 100 MG: 100 TAB at 07:48

## 2022-03-13 RX ADMIN — FOLIC ACID 1 MG: 1 TABLET ORAL at 07:48

## 2022-03-13 RX ADMIN — DIAZEPAM 20 MG: 5 TABLET ORAL at 10:34

## 2022-03-13 RX ADMIN — MULTIPLE VITAMINS W/ MINERALS TAB 1 TABLET: TAB at 07:48

## 2022-03-13 RX ADMIN — DIAZEPAM 10 MG: 10 TABLET ORAL at 22:42

## 2022-03-13 RX ADMIN — DIAZEPAM 10 MG: 5 TABLET ORAL at 08:59

## 2022-03-13 RX ADMIN — DIAZEPAM 10 MG: 5 TABLET ORAL at 11:10

## 2022-03-13 RX ADMIN — LISINOPRIL 10 MG: 10 TABLET ORAL at 07:48

## 2022-03-13 RX ADMIN — CLONIDINE HYDROCHLORIDE 0.1 MG: 0.1 TABLET ORAL at 11:10

## 2022-03-13 RX ADMIN — HYDRALAZINE HYDROCHLORIDE 25 MG: 25 TABLET ORAL at 13:15

## 2022-03-13 RX ADMIN — NICOTINE 1 PATCH: 21 PATCH, EXTENDED RELEASE TRANSDERMAL at 07:49

## 2022-03-13 RX ADMIN — DIAZEPAM 10 MG: 5 TABLET ORAL at 13:15

## 2022-03-13 RX ADMIN — POLYETHYLENE GLYCOL 3350 17 G: 17 POWDER, FOR SOLUTION ORAL at 07:49

## 2022-03-13 RX ADMIN — CLONIDINE HYDROCHLORIDE 0.1 MG: 0.1 TABLET ORAL at 08:59

## 2022-03-13 ASSESSMENT — ACTIVITIES OF DAILY LIVING (ADL)
WEAR_GLASSES_OR_BLIND: NO
WALKING_OR_CLIMBING_STAIRS_DIFFICULTY: NO
ADLS_ACUITY_SCORE: 5
CONCENTRATING,_REMEMBERING_OR_MAKING_DECISIONS_DIFFICULTY: NO
HYGIENE/GROOMING: PROMPTS
ADLS_ACUITY_SCORE: 5
DOING_ERRANDS_INDEPENDENTLY_DIFFICULTY: NO
ADLS_ACUITY_SCORE: 5
ORAL_HYGIENE: PROMPTS
DRESS: PROMPTS
ADLS_ACUITY_SCORE: 5
DRESSING/BATHING_DIFFICULTY: NO
DIFFICULTY_EATING/SWALLOWING: NO
ADLS_ACUITY_SCORE: 5
TOILETING_ISSUES: NO
FALL_HISTORY_WITHIN_LAST_SIX_MONTHS: YES
ADLS_ACUITY_SCORE: 5
ADLS_ACUITY_SCORE: 5
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
## 2022-03-13 NOTE — PROGRESS NOTES
Pt hyper verbal, insisting on getting out of the unit, and agitated - pacing in the halls; pt stated there are bed bugs in his room and in his mattress. Pt insisted he could not return to room with bugs. Pt became visibility agitated -pacing and increased volume of voice. Pt agreeable to move rooms - and that there was no bugs in his new room.  MSSA 11 - 2 mg of ativan given.

## 2022-03-13 NOTE — PLAN OF CARE
Goal Outcome Evaluation:      Pt will remain safe during stay, pt withdraw symptoms will improve.

## 2022-03-13 NOTE — PROVIDER NOTIFICATION
"BP (!) 174/115   Pulse 103   Temp 97.8  F (36.6  C) (Temporal)   Resp 18   Ht 1.803 m (5' 11\")   Wt 111.1 kg (245 lb)   SpO2 95%   BMI 34.17 kg/m    Informed internal medicine    "

## 2022-03-13 NOTE — PLAN OF CARE
"  Problem: Alcohol Withdrawal  Goal: Alcohol Withdrawal Symptom Control  Outcome: Ongoing, Progressing     Problem: Acute Neurologic Deterioration (Alcohol Withdrawal)  Goal: Optimal Neurologic Function  Outcome: Ongoing, Progressing   Goal Outcome Evaluation:    Plan of Care Reviewed With: patient      Patient isolative to room, did attend offered group this evening. Patient affect tense, mood is anxious. Patient denies SI, SIB, HI. Patient reported some auditory and visual hallucinations, during 1600 alcohol withdrawal assessment. Denies hallucinations at 2000 assessment. Patient continues to be monitored for alcohol withdrawal. Patient MSSA scores 8 and 6. Patient was given 2 mg of ativan for withdrawal symptoms. Patient VSS, appetite good. Reports ongoing chronic pain. Patient also was reporting frustration with medications and care. Made statement of wanting to discharge. Was informed that he must speak with the physician tomorrow if he is interested in discharge. Patient was prn Zyprexa 5 mg times 2 for hallucinations and agitation. Patient was also given prn hydroxyzine 25 mg times 2 for anxiety. Was given prn ibuprofen 400 mg times 2 for pain. Patient reported minimal relief. Patient also requested trazodone 50 mg, and had a repeat dose due to not sleeping. Patient still plans to attend [facility] with residential services. Patient denies any further concerns. BP (!) 153/98 (BP Location: Left arm)   Pulse 61   Temp 97.6  F (36.4  C) (Temporal)   Resp 16   Ht 1.803 m (5' 11\")   Wt 111.1 kg (245 lb)   SpO2 92%   BMI 34.17 kg/m              "
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
## 2022-03-13 NOTE — PROVIDER NOTIFICATION
Patient will be transferred to a medical unit, waiting on bed placement.  Informed Deb Naegele and pt will be placed on a 72 hour hold to transfer to a medical unit.  No further medications should be given on 3A per consult with Deb Naegele. Will contact provider if transfer is delayed.

## 2022-03-13 NOTE — PROVIDER NOTIFICATION
Spoke with internal medicine and instructed to given hydrALAZINE for HTN and valium per [facility] protocol as transfer to  maybe delayed.  Will also give schedule gabapentin now.

## 2022-03-13 NOTE — PLAN OF CARE
Goal Outcome Evaluation:    Plan of Care Reviewed With: patient     Overall Patient Progress: declining      MSSA 12, 20, 17 received valium 10 mg, 20 mg and 10 mg.  Pt remains hypertensive despite lisinopril and clonidine.  He is agitated attempting to leave the unit, placed on elopement precautions and remains on SIO.  Pt presentation much different then yesterday.  He is orientated at times and then other times he thinks he is at a dentist office. Focused on leaving, going to work, going outside to smoke.  He has tried to come behind the desk, pacing the unit and not improving.  Will notified providers.

## 2022-03-13 NOTE — PLAN OF CARE
Goal Outcome Evaluation:    Problem: Alcohol Withdrawal  Goal: Alcohol Withdrawal Symptom Control  Outcome: Ongoing, Not Progressing    Patient has been up for the majority of the night. He slept a total of 3.25 hours.    Patient's initial assessment was 4. He endorsed feeling nauseous, but appeared to be asleep shortly after interacting with staff. Patient later approached staff stating that he was not being accommodated and started demanding to leave the unit. He needed to be redirected multiple times.     Patient was given zyprexa 5mg for agitation; no change in status. Patient appeared for decompensate.    MSSA=10 and 11; visual hallucinations of bugs in his room and agitation. Ativan 2mg x2 was administered.    Patient was delusional, paranoid, intrusive, and agitated.  Pt was difficult to redirect, loud, and at times posturing towards staff.  Pt had difficulty processing and following directions. Pt demanded to leave and had to be redirected from walking towards the exit door. Patient was placed on SIO due to the mentioned behaviors and for him to be able to stay safe on the unit.     Plan to assess patient and redirect him when needed.     Internal medicine was informed about the patient deteroriating and they plan to assess him during the day.    We'll continue to monitor patient closely.

## 2022-03-13 NOTE — PROGRESS NOTES
"Brief Medicine Note    Contacted by nursing this morning that patient is having worsening withdrawal symptoms.  Overnight the patient became more agitated, anxious, restless, and had some hallucinations.  He was reporting bugs in his room and once his insistence that he could not be in that room anymore.  He moved rooms and his hallucination of bugs resolved.  He received 5 mg of Zyprexa x2 overnight. He also received as needed hydroxyzine x2.    This morning he continues to be very restless, agitated, and anxious.  He has seemed more generally out of it.  He has been pacing around the unit and making nonsensical statements such as saying that his mother is here even though she is not.    Blood pressure is 174/129 this morning.  This was taken before his morning dose of lisinopril.  Heart rate is in the 70s.  He scored a 14 on the MSSA protocol this morning.    Both myself and Dr. Radha Lobato evaluated the patient this morning.  He expresses to me that his primary concerns are nicotine withdrawal and a myriad of medical complaints.  He tells me that he primarily came here to have his medical issues of abdominal pain and joint pain addressed.  He then proceeds to tangentially describe various random medical problems including pain near one of his elbows.  We did discuss his chronic abdominal pain yesterday and he told me that this has been unchanged for at least 1 year. He is able to tell me that his mother and wife brought him to the hospital. He describes going to a  for his grandmother recently.     He denies a history of withdrawal seizures and delirium tremens.  He notes he has maybe had some mild hallucinations with past periods of alcohol withdrawal but his description of this is difficult to follow. He is making claims to staff that he wants to discharge.       BP (!) 157/115   Pulse 88   Temp 98.1  F (36.7  C) (Temporal)   Resp 16   Ht 1.803 m (5' 11\")   Wt 111.1 kg (245 lb)   SpO2 97%   BMI " 34.17 kg/m    General: Awake and alert. Found pacing around the unit. During our conversation he is clearly restless, anxious, and agitated.    Eyes: Sclera clear, anicteric   Respiratory: Breathing comfortably on room air.   GI: Non-distended.  Skin:  Good color. No jaundice. No visible rashes, lesions, or bruising of concern.   Neurologic: Alert and oriented to person, place of [facility], month/year. He is able to state the days of the week backwards without issue. Moves all extremities. Resting tremor of bilateral upper extremities.    Neuropsychiatric: His speech is pressured and tangential. Not consistently tracking our conversation.       Plan:   - Switch from lorazepam to PRN diazepam administered per [facility] protocol. The patient does not have known underlying liver disease and his LFTs have improved.   - Consider starting high-dose gabapentin taper   - Patient is appropriate to remain on station 3A. While he is having severe withdrawal symptoms he is able to take PO medications and does not seem to be in active DTs at this time.    - Continue to closely monitor   - Clonidine PRN for high blood pressure    Plan of care discussed with Dr. Lobato. Verbally discussed recommendations with Deb Naegele, CNP, of Psychiatry as well.     Addendum: Notified by RN later this morning that his condition has continued to worsen. He is more agitated and restless. Delusional. Not redirectable. No improvement despite 40 mg PO diazepam. BP elevated despite receiving 0.2 mg PO clonidine. Discussed with Dr. Lobato, will transfer patient to Medicine for ongoing management. Psychiatry notified.     Yanet Waddell PA-C  Hospitalist Service  Pager: [phone]
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
## 2022-03-13 NOTE — PROVIDER NOTIFICATION
"BP (!) 183/138   Pulse 111   Temp 97.6  F (36.4  C) (Temporal)   Resp 16   Ht 1.803 m (5' 11\")   Wt 111.1 kg (245 lb)   SpO2 95%   BMI 34.17 kg/m    Notified internal medicine and gave clonodine  "

## 2022-03-13 NOTE — PROGRESS NOTES
When pt given medication for withdrawal- pt stated he would not take it until he was given a different room. Then he stated he wouldn't take the medication (2 mg of ativan) unless it was 8 mg. The pt then stamped back and forth demanding more medication.

## 2022-03-13 NOTE — PROGRESS NOTES
Pt transferred to  via wheelchair with security and 2 staff.  Reports was called to charge nurse and nurse accepting patient. Pt informed that he is now on a 72 hour hold and I attempted to read him his rights but he became extremely agitated, made illogical statements and not able to listen. Paperwork sent with patient to  and nurse informed. Pt had signed ISABEL for his girl friend, #[phone]. I left a message for her letting her know that he was transferred to

## 2022-03-14 LAB
ERYTHROCYTE [DISTWIDTH] IN BLOOD BY AUTOMATED COUNT: 13.3 % (ref 10–15)
HCT VFR BLD AUTO: 35.5 % (ref 40–53)
HGB BLD-MCNC: 12.1 G/DL (ref 13.3–17.7)
HOLD SPECIMEN: NORMAL
HOLD SPECIMEN: NORMAL
MAGNESIUM SERPL-MCNC: 1.8 MG/DL (ref 1.6–2.3)
MCH RBC QN AUTO: 33.3 PG (ref 26.5–33)
MCHC RBC AUTO-ENTMCNC: 34.1 G/DL (ref 31.5–36.5)
MCV RBC AUTO: 98 FL (ref 78–100)
PLATELET # BLD AUTO: 146 10E3/UL (ref 150–450)
POTASSIUM BLD-SCNC: 3.6 MMOL/L (ref 3.4–5.3)
RBC # BLD AUTO: 3.63 10E6/UL (ref 4.4–5.9)
WBC # BLD AUTO: 5 10E3/UL (ref 4–11)

## 2022-03-14 PROCEDURE — 258N000003 HC RX IP 258 OP 636: Performed by: PHYSICIAN ASSISTANT

## 2022-03-14 PROCEDURE — 250N000013 HC RX MED GY IP 250 OP 250 PS 637: Performed by: INTERNAL MEDICINE

## 2022-03-14 PROCEDURE — 250N000013 HC RX MED GY IP 250 OP 250 PS 637: Performed by: PEDIATRICS

## 2022-03-14 PROCEDURE — 99232 SBSQ HOSP IP/OBS MODERATE 35: CPT | Performed by: PSYCHIATRY & NEUROLOGY

## 2022-03-14 PROCEDURE — 120N000002 HC R&B MED SURG/OB UMMC

## 2022-03-14 PROCEDURE — 36415 COLL VENOUS BLD VENIPUNCTURE: CPT | Performed by: PHYSICIAN ASSISTANT

## 2022-03-14 PROCEDURE — 250N000013 HC RX MED GY IP 250 OP 250 PS 637: Performed by: PHYSICIAN ASSISTANT

## 2022-03-14 PROCEDURE — 85027 COMPLETE CBC AUTOMATED: CPT | Performed by: PHYSICIAN ASSISTANT

## 2022-03-14 PROCEDURE — 83735 ASSAY OF MAGNESIUM: CPT | Performed by: INTERNAL MEDICINE

## 2022-03-14 PROCEDURE — 99232 SBSQ HOSP IP/OBS MODERATE 35: CPT | Performed by: INTERNAL MEDICINE

## 2022-03-14 PROCEDURE — 36415 COLL VENOUS BLD VENIPUNCTURE: CPT | Performed by: INTERNAL MEDICINE

## 2022-03-14 PROCEDURE — 84132 ASSAY OF SERUM POTASSIUM: CPT | Performed by: INTERNAL MEDICINE

## 2022-03-14 RX ORDER — MAGNESIUM OXIDE 400 MG/1
400 TABLET ORAL 2 TIMES DAILY
Status: COMPLETED | OUTPATIENT
Start: 2022-03-14 | End: 2022-03-15

## 2022-03-14 RX ORDER — POTASSIUM CHLORIDE 750 MG/1
20 TABLET, EXTENDED RELEASE ORAL ONCE
Status: COMPLETED | OUTPATIENT
Start: 2022-03-14 | End: 2022-03-14

## 2022-03-14 RX ADMIN — DIAZEPAM 10 MG: 10 TABLET ORAL at 20:12

## 2022-03-14 RX ADMIN — DIAZEPAM 10 MG: 10 TABLET ORAL at 23:19

## 2022-03-14 RX ADMIN — POLYETHYLENE GLYCOL 3350 17 G: 17 POWDER, FOR SOLUTION ORAL at 10:10

## 2022-03-14 RX ADMIN — SODIUM CHLORIDE, POTASSIUM CHLORIDE, SODIUM LACTATE AND CALCIUM CHLORIDE: 600; 310; 30; 20 INJECTION, SOLUTION INTRAVENOUS at 01:27

## 2022-03-14 RX ADMIN — SODIUM CHLORIDE, POTASSIUM CHLORIDE, SODIUM LACTATE AND CALCIUM CHLORIDE: 600; 310; 30; 20 INJECTION, SOLUTION INTRAVENOUS at 10:11

## 2022-03-14 RX ADMIN — NICOTINE 1 PATCH: 21 PATCH, EXTENDED RELEASE TRANSDERMAL at 00:24

## 2022-03-14 RX ADMIN — NICOTINE 1 PATCH: 21 PATCH, EXTENDED RELEASE TRANSDERMAL at 10:00

## 2022-03-14 RX ADMIN — THIAMINE HCL TAB 100 MG 100 MG: 100 TAB at 10:04

## 2022-03-14 RX ADMIN — DIAZEPAM 10 MG: 10 TABLET ORAL at 10:32

## 2022-03-14 RX ADMIN — GABAPENTIN 900 MG: 300 CAPSULE ORAL at 20:12

## 2022-03-14 RX ADMIN — Medication 10 MG: at 03:12

## 2022-03-14 RX ADMIN — DIAZEPAM 10 MG: 10 TABLET ORAL at 01:26

## 2022-03-14 RX ADMIN — POTASSIUM CHLORIDE 20 MEQ: 750 TABLET, EXTENDED RELEASE ORAL at 10:40

## 2022-03-14 RX ADMIN — DIAZEPAM 10 MG: 10 TABLET ORAL at 16:02

## 2022-03-14 RX ADMIN — NICOTINE POLACRILEX 4 MG: 4 LOZENGE ORAL at 21:37

## 2022-03-14 RX ADMIN — Medication 1 MG: at 02:12

## 2022-03-14 RX ADMIN — GABAPENTIN 900 MG: 300 CAPSULE ORAL at 11:40

## 2022-03-14 RX ADMIN — LISINOPRIL 10 MG: 10 TABLET ORAL at 10:04

## 2022-03-14 RX ADMIN — Medication 400 MG: at 10:40

## 2022-03-14 RX ADMIN — DIAZEPAM 10 MG: 10 TABLET ORAL at 22:42

## 2022-03-14 RX ADMIN — DIAZEPAM 10 MG: 10 TABLET ORAL at 03:12

## 2022-03-14 RX ADMIN — DIAZEPAM 10 MG: 10 TABLET ORAL at 02:10

## 2022-03-14 RX ADMIN — METHADONE HYDROCHLORIDE 125 MG: 5 SOLUTION ORAL at 10:04

## 2022-03-14 RX ADMIN — OLANZAPINE 10 MG: 5 TABLET, ORALLY DISINTEGRATING ORAL at 23:51

## 2022-03-14 RX ADMIN — DIAZEPAM 10 MG: 10 TABLET ORAL at 13:11

## 2022-03-14 RX ADMIN — MULTIPLE VITAMINS W/ MINERALS TAB 1 TABLET: TAB at 10:04

## 2022-03-14 RX ADMIN — FOLIC ACID 1 MG: 1 TABLET ORAL at 10:04

## 2022-03-14 RX ADMIN — Medication 400 MG: at 20:12

## 2022-03-14 RX ADMIN — NICOTINE POLACRILEX 4 MG: 4 LOZENGE ORAL at 00:27

## 2022-03-14 ASSESSMENT — ACTIVITIES OF DAILY LIVING (ADL)
ADLS_ACUITY_SCORE: 5

## 2022-03-14 NOTE — CONSULTS
Consult Date: 03/14/2022    PSYCHIATRIC CONSULTATION    IDENTIFICATION:  Mr. Shahram Young is a 36-year-old white male who is hospitalized for detoxification.  He was initially admitted by Debra Naegele, APRN, to Psychiatry for detoxification, but his detox became  increasingly difficult with auditory and visual hallucinations and agitated behaviors.  I am asked to provide followup by Dr. Lobato.    Prior to interviewing this patient, I had an opportunity to review the history and physical completed by Debra Naegele, as well as the more recent H and P completed by Dr. Lobato.  I note that the patient has been treated appropriately on a CIWA program.  He is also receiving methadone for his opiate addiction and he is clearly improved.  On my interview, the patient tells me he is fine and wants to go home.  He says the alternative would be for him to be appropriately medicated.  When I asked what that would include, he said marijuana.  It appears that he would like me to write him a prescription for marijuana, which I refused.  I am concerned that he is not quite ready for discharge as he fell asleep twice during my interview.  He claimed he was not sleeping, but simply thinking; however, these were brief episodes and when he woke up, he would mumble incoherently for a brief period of time.  It appears to me that this is likely secondary to his sedating medications.  Nursing staff has been very diligent in watching his symptoms and treating appropriately with benzodiazepines.    MENTAL STATUS EXAMINATION:  On my interview, as mentioned above, the patient was sedated and would occasionally drift off to sleep.  His mood was somewhat dysphoric.  His affect restricted.  His speech was usually coherent and goal-oriented, except when he woke up from his brief dosing and then he was somewhat incoherent.  In general, when awake, his associations were tight and thought processes logical and linear, though requesting marijuana  was perhaps a tad illogical, given that he is in the hospital for detoxification.  He is intermittently alert and oriented x 3.  Recent and remote memory, concentration, fund of knowledge and use of language appear to be at baseline when he is awake.  Muscle strength and tone appear to be at baseline.  Insight and judgment are still quite guarded.      RECENT VITAL SIGNS:  Include a blood pressure of 158/111, temperature of 96, pulse of 65, respiration rate of 18 with 93% oxygen saturation.    This patient has received Valium this morning on the CIWA protocol.  He continues to have hypertension and appears to still be in withdrawal.  Although he is requesting to go home.  I do not believe he is yet safe for discharge, basically based on his intermittent mental status changes.    ASSESSMENT:    1.  Alcohol use disorder.  2.  Alcohol withdrawal.  3.  Opiate use disorder.    RECOMMENDATIONS:  For now, I would continue his current medications.  He definitely seems to be improving, but I do not believe he is quite at the point that he would be safe to discharge due to the fact that he keeps falling asleep, but then intermittently requires Valium.  I will discuss the case with the treatment team.    Fernandez Pimentel MD        D: 2022   T: 2022   MT: JOSHUA    Name:     GAY MILNER  MRN:      [id number]        Account:      [id number]   :      1985           Consult Date: 2022     Document: [id number]

## 2022-03-14 NOTE — PLAN OF CARE
Alert, disoriented to situation and confused intermittently. Ambulating independently. Bedside attendant present.  Continent of bowel and bladder.  Denied pain during night.  Received 10mg valium x4 during night shift. Pt can be anxious, agitated and restless. Can get very frustrated and will state that he will leave the floor. Eventually fell asleep at ~04:00.

## 2022-03-14 NOTE — PLAN OF CARE
Pt disoriented to time. Stated it was a Thursday or Friday and the month was February. Forgetful. Was alert and talkative at 1545, restless, tremors, sweating, stated generalized pain, /67 (improved from morning), disoriented to time, mildly nauseated (no appetite), and stated he was anxious. Pt scored 12 on CIWA. Valium given. Pt slept after Valium, occasionally waking up mumbling to self (illogical) then falling back asleep.

## 2022-03-14 NOTE — CONSULTS
"Brief Note    SW consult in for \"Chemical dependency. Rule 25 assessment.\" CD consulted. SW will defer to them and get involved if necessary.     Apirl BALBUENA, ARNULFO  [facility]   Phone: [phone]  Pager: [phone]      "

## 2022-03-14 NOTE — SUMMARY OF CARE
Hand off report given to night RN Tiffany, pt resting in bed, bed low and lock, call bell is in reach, safety maintained.

## 2022-03-14 NOTE — PROGRESS NOTES
[facility]    Medicine Progress Note - Hospitalist Service, GOLD TEAM 18    Date of Admission:  3/13/2022    Assessment & Plan               Shahram Young is a 36 year old year old male with a history of alcohol use disorder, opiate use disorder on methadone maintenance, hypertension, diverticulitis s/p partial colectomy, who was admitted to station 3A seeking detoxification from alcohol. l. He was transferred to Internal Medicine for closer monitoring and ongoing management. On 3/13    Severe Alcohol withdrawal   Severe Alcohol use disorder  Drinking about 1 liter per day. Last drink on AM of 3/11. No history of withdrawal seizures or DTs. Worsening 3/12 with delusions, agitation, restlessness, anxiety.  Deb Naegele, [place], of Psychiatry placed the patient on a 72-hour hold effective 3/13/22 at 1200.    - SW, LADC, Psychiatry consults   - CIWA protocol with Valium PRN   - Start high-dose gabapentin taper   - Zyprexa, Haldol PRN for agitation   - Folic acid, MVI, thiamine   - LR mIVF at 100 mL/hr   - Replete K to >4 and mag to >2   - CMP, mag, phos in AM   - Telemetry monitoring   - Seizure precautions   - Bedside attendant   - Remains on a 72 hr hold    Opiate use disorder: On methadone maintenance of 125 mg daily.   - Pharmacy consult to confirm methadone dose   - Continue methadone 125 mg daily    Anxiety:   - Hold fluoxetine for now as per psych       Hypertension: BP elevated in the setting of acute withdrawal and agitation compounded on underlying essential hypertension.   - PTA lisinopril 10 mg daily with hold parameters   - Clonidine 0.1 mg TID PRN for SBP >170 or DBP >110    Transaminitis, improved: ,  on admission with normal Tbili and alk phos. Prior labs with intermittent transaminase elevations. 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 Asymptomatic. Labs improved today.    - CMP     Tobacco use disorder: Nicotine patch + lozenges.     Hx  "of hemorrhoids: Continue topical hydrocortisone cream.         Diet: Combination Diet Regular Diet Adult    DVT Prophylaxis: Anti-embolisim stockings (TEDs)  Hedrick Catheter: Not present  Central Lines: None  Cardiac Monitoring: ACTIVE order. Indication: severe alcohol withdrawal  Code Status: Full Code      Disposition Plan   Expected Discharge:    Anticipated discharge location:  Awaiting care coordination huddle  Delays:           The patient's care was discussed with the Bedside Nurse, Care Coordinator/ and Patient.    Radha Lobato MD  Hospitalist Service, [facility] 18  [facility]  Securely message with the Vocera Web Console (learn more here)  Text page via [facility] Paging/Directory   Please see signed in provider for up to date coverage information      Clinically Significant Risk Factors Present on Admission          # Hypercalcemia: Ca = 10.2 mg/dL (Ref range: 8.5 - 10.1 mg/dL) and/or iCa = N/A on admission, will monitor as appropriate        # Obesity: Estimated body mass index is 34.17 kg/m  as calculated from the following:    Height as of 3/11/22: 1.803 m (5' 11\").    Weight as of 3/11/22: 111.1 kg (245 lb).      ______________________________________________________________________    Interval History   Remained agitated   Sleeping currently   Tolerating gabapentin     Data reviewed today: I reviewed all medications, new labs and imaging results over the last 24 hours. .    Physical Exam   Vital Signs: Temp: 98  F (36.7  C) Temp src: Oral BP: (!) 143/102 Pulse: 76   Resp: 19 SpO2: 94 % O2 Device: None (Room air)    Anxious  Neck ; supple  Chest ; clear   GI ; soft abdomen, obese , BS positive . Non tender   CVS ; RRR, no m/r/g  Neuro ; alert , pressured speech . Non focal   Skin  No rash on exposed skin     Data   Recent Labs   Lab 03/14/22  0526 03/13/22  1459 03/13/22  0600 03/13/22  0505 03/12/22  1101 03/11/22  1409   WBC 5.0  --   --   -- "   --  7.3   HGB 12.1*  --   --   --   --  15.0   MCV 98  --   --   --   --  96   *  --   --   --   --  223   NA  --   --  136  --   --  140   POTASSIUM  --  4.1 3.9  --  3.9 3.1*   CHLORIDE  --   --  102  --   --  100   CO2  --   --  24  --   --  30   BUN  --   --  15  --   --  13   CR  --   --  0.67  --   --  0.79   ANIONGAP  --   --  10  --   --  10   LINDA  --   --  10.2*  --   --  9.4   GLC  --   --  95 102*  --  175*   ALBUMIN  --   --  3.7  --   --  4.2   PROTTOTAL  --   --  7.0  --   --  7.7   BILITOTAL  --   --  1.0  --   --  0.8   ALKPHOS  --   --  70  --   --  82   ALT  --   --  180*  --   --  263*   AST  --   --  145*  --   --  364*   LIPASE  --   --   --   --   --  100

## 2022-03-14 NOTE — PLAN OF CARE
"Goal Outcome Evaluation:        Report given at 1830, pt was sleeping hard, snoring loud. Could not wake up for meds, woke up and gave meds, pt scored 8 on CIWA gave 10 mg valium. Pt woke up 2245 agitated, threatened to take out IV and run away. Pt upset because he doesn't have his cell phone and feels like \"he is being held prisoner.\" Explained here for safety. 1:1 calmed pt down. Back in bed and talking. Pt now has refused night 1:1, accusing him of false situations. Report given to night nurse, monitor for safety and behavior.              "

## 2022-03-14 NOTE — PLAN OF CARE
Writer acted as RN and 1:1.    Pt alert but disoriented to time. Ind. Cont. R PIV infusing LR at 100 ml/hr. Denied pain. Denies hallucinations or disturbances but while resting in bed w/ eyes closed will mimic movements of reaching for something, thinking he's at work, and will talk to himself/mumble about something incoherent. At times will be alert but can drift off w/ eyes closed. Easy to arouse. CIWA scores 9 and 12, Valium given x2 during shift. K and Mag replaced. 1:1 to cont due to 72 hr hold. Continue w/ plan of care

## 2022-03-15 LAB
ALBUMIN SERPL-MCNC: 3.1 G/DL (ref 3.4–5)
ALP SERPL-CCNC: 60 U/L (ref 40–150)
ALT SERPL W P-5'-P-CCNC: 292 U/L (ref 0–70)
ANION GAP SERPL CALCULATED.3IONS-SCNC: 5 MMOL/L (ref 3–14)
AST SERPL W P-5'-P-CCNC: 258 U/L (ref 0–45)
BILIRUB SERPL-MCNC: 0.6 MG/DL (ref 0.2–1.3)
BUN SERPL-MCNC: 12 MG/DL (ref 7–30)
CALCIUM SERPL-MCNC: 8.9 MG/DL (ref 8.5–10.1)
CHLORIDE BLD-SCNC: 111 MMOL/L (ref 94–109)
CO2 SERPL-SCNC: 25 MMOL/L (ref 20–32)
CREAT SERPL-MCNC: 0.71 MG/DL (ref 0.66–1.25)
ERYTHROCYTE [DISTWIDTH] IN BLOOD BY AUTOMATED COUNT: 13.4 % (ref 10–15)
GFR SERPL CREATININE-BSD FRML MDRD: >90 ML/MIN/1.73M2
GLUCOSE BLD-MCNC: 91 MG/DL (ref 70–99)
HCT VFR BLD AUTO: 39.1 % (ref 40–53)
HGB BLD-MCNC: 12.9 G/DL (ref 13.3–17.7)
MCH RBC QN AUTO: 33.7 PG (ref 26.5–33)
MCHC RBC AUTO-ENTMCNC: 33 G/DL (ref 31.5–36.5)
MCV RBC AUTO: 102 FL (ref 78–100)
PLATELET # BLD AUTO: 128 10E3/UL (ref 150–450)
POTASSIUM BLD-SCNC: 4 MMOL/L (ref 3.4–5.3)
PROT SERPL-MCNC: 6.4 G/DL (ref 6.8–8.8)
RBC # BLD AUTO: 3.83 10E6/UL (ref 4.4–5.9)
SODIUM SERPL-SCNC: 141 MMOL/L (ref 133–144)
WBC # BLD AUTO: 4.4 10E3/UL (ref 4–11)

## 2022-03-15 PROCEDURE — 250N000013 HC RX MED GY IP 250 OP 250 PS 637: Performed by: INTERNAL MEDICINE

## 2022-03-15 PROCEDURE — 250N000013 HC RX MED GY IP 250 OP 250 PS 637: Performed by: PHYSICIAN ASSISTANT

## 2022-03-15 PROCEDURE — 85027 COMPLETE CBC AUTOMATED: CPT | Performed by: INTERNAL MEDICINE

## 2022-03-15 PROCEDURE — 80053 COMPREHEN METABOLIC PANEL: CPT | Performed by: INTERNAL MEDICINE

## 2022-03-15 PROCEDURE — 250N000013 HC RX MED GY IP 250 OP 250 PS 637: Performed by: PEDIATRICS

## 2022-03-15 PROCEDURE — 120N000002 HC R&B MED SURG/OB UMMC

## 2022-03-15 PROCEDURE — 36415 COLL VENOUS BLD VENIPUNCTURE: CPT | Performed by: INTERNAL MEDICINE

## 2022-03-15 PROCEDURE — 99232 SBSQ HOSP IP/OBS MODERATE 35: CPT | Performed by: PSYCHIATRY & NEUROLOGY

## 2022-03-15 PROCEDURE — 99233 SBSQ HOSP IP/OBS HIGH 50: CPT | Performed by: INTERNAL MEDICINE

## 2022-03-15 RX ORDER — IBUPROFEN 600 MG/1
600 TABLET, FILM COATED ORAL EVERY 6 HOURS PRN
Status: DISCONTINUED | OUTPATIENT
Start: 2022-03-15 | End: 2022-03-17 | Stop reason: HOSPADM

## 2022-03-15 RX ADMIN — NICOTINE POLACRILEX 4 MG: 4 LOZENGE ORAL at 16:01

## 2022-03-15 RX ADMIN — GABAPENTIN 900 MG: 300 CAPSULE ORAL at 15:43

## 2022-03-15 RX ADMIN — MULTIPLE VITAMINS W/ MINERALS TAB 1 TABLET: TAB at 10:56

## 2022-03-15 RX ADMIN — DIAZEPAM 10 MG: 10 TABLET ORAL at 11:08

## 2022-03-15 RX ADMIN — Medication 10 MG: at 01:18

## 2022-03-15 RX ADMIN — FOLIC ACID 1 MG: 1 TABLET ORAL at 10:56

## 2022-03-15 RX ADMIN — DIAZEPAM 10 MG: 10 TABLET ORAL at 05:38

## 2022-03-15 RX ADMIN — NICOTINE POLACRILEX 4 MG: 4 LOZENGE ORAL at 22:10

## 2022-03-15 RX ADMIN — DIAZEPAM 10 MG: 10 TABLET ORAL at 21:41

## 2022-03-15 RX ADMIN — METHADONE HYDROCHLORIDE 125 MG: 5 SOLUTION ORAL at 10:58

## 2022-03-15 RX ADMIN — GABAPENTIN 900 MG: 300 CAPSULE ORAL at 21:28

## 2022-03-15 RX ADMIN — DIAZEPAM 10 MG: 10 TABLET ORAL at 16:52

## 2022-03-15 RX ADMIN — Medication 400 MG: at 10:56

## 2022-03-15 RX ADMIN — NICOTINE 1 PATCH: 21 PATCH, EXTENDED RELEASE TRANSDERMAL at 10:58

## 2022-03-15 RX ADMIN — LISINOPRIL 10 MG: 10 TABLET ORAL at 10:56

## 2022-03-15 RX ADMIN — GABAPENTIN 900 MG: 300 CAPSULE ORAL at 05:16

## 2022-03-15 RX ADMIN — DIAZEPAM 10 MG: 10 TABLET ORAL at 01:06

## 2022-03-15 RX ADMIN — POLYETHYLENE GLYCOL 3350 17 G: 17 POWDER, FOR SOLUTION ORAL at 10:57

## 2022-03-15 RX ADMIN — THIAMINE HCL TAB 100 MG 100 MG: 100 TAB at 10:56

## 2022-03-15 RX ADMIN — CLONIDINE HYDROCHLORIDE 0.1 MG: 0.1 TABLET ORAL at 05:38

## 2022-03-15 RX ADMIN — Medication 400 MG: at 21:28

## 2022-03-15 ASSESSMENT — ACTIVITIES OF DAILY LIVING (ADL)
ADLS_ACUITY_SCORE: 5

## 2022-03-15 NOTE — PROVIDER NOTIFICATION
Patient ask sitter to call writer to talk about 72 hr hold. Writer went into room and patient preceded to go on a tangent about RN's on detox lying about going home. Then talked about how patient isn't receiving enough medication for detox and wanting to leave. Writer reminded patient about why he is here and ask patient if he remembers any of the events leading up to his admission here. Patient brushed off the question and loosely remembers the events. Writer explained the withdrawal protocol to patient, but still insist on leaving. Writer called a Code green. Mental health team & security members preceded to reason w/ patient and remind him of the hold in place. Eventually patient stayed in his room, but was still very agitated. Valium was not given long ago before this event happened. Valium was then given again. Possible that patient is seeking? 10 - 15 min after personal left, patient was seen with the sitter walking outside the room towards the end of the hallway. Writer called code 21, as writer called code, patient returned to room. Writer talked with psych response team and reinforced the 72 hr hold and that if patient where to try to leave again restraints where to be place. Writer handed off to next RN. FLIP nieto, MD updated and paged to get something for more for agitation.

## 2022-03-15 NOTE — CONSULTS
Psychiatry Consultation; Follow up              Reason for Consult, requesting source:    Alcohol withdrawal, on 72 hour hold placed 1200 on 3/13. He was initially seen by Dr Pimentel from our service yesterday.   Requesting source: Radha Sharyn Luis Alfredo    Labs and imaging reviewed, reviewed with nursing                Interim history:    He is admitted for alcohol withdrawal and this has been quite severe requiring a lot of benzodiazepine, clonidine and gabapentin. He was having hallucinations for which he received Zyprexa 5 mg several times on the 12th and 13th. As of today he has had 30 mg of Valium under CIWA, and per nursing he may be exaggerating symptoms to receive more Valium.   When I came to see him he was sleeping soundly and cannot be easily awakened.  According to nursing he does still become intermittently agitated and would like to leave the hospital.  However, he has been redirectable when he is reminded he is on a 72-hour hold.  When Dr. Pimentel saw him he was receiving quite a bit of Valium for withdrawal and still had significantly altered mental status, so he did not think that he was safe to leave at that point.          Current Medications:       folic acid  1 mg Oral Daily     [START ON 3/20/2022] gabapentin  100 mg Oral Q8H     [START ON 3/18/2022] gabapentin  300 mg Oral Q8H     [START ON 3/16/2022] gabapentin  600 mg Oral Q8H     gabapentin  900 mg Oral Q8H     lisinopril  10 mg Oral Daily     magnesium oxide  400 mg Oral BID     methadone  125 mg Oral Daily     multivitamin w/minerals  1 tablet Oral Daily     nicotine  1 patch Transdermal Daily     nicotine   Transdermal Q8H     polyethylene glycol  17 g Oral Daily     sodium chloride (PF)  3 mL Intracatheter Q8H     thiamine  100 mg Oral Daily              MSE:   Lying on his back asleep and snoring. Grooming ok. He did not respond to a verbal cue to wake up, so he was left alone.     Vital signs:  Temp: 97.1  F (36.2  C) Temp src: Oral  "BP: (!) 152/101 Pulse: 74   Resp: 18 SpO2: 97 % O2 Device: None (Room air)        Estimated body mass index is 34.17 kg/m  as calculated from the following:    Height as of 3/11/22: 1.803 m (5' 11\").    Weight as of 3/11/22: 111.1 kg (245 lb).            DSM-5 Diagnosis:   Alcohol use disorder and withdrawal   Opioid use disorder on maintenance          Assessment:   In my opinion, he should be safe for discharge by later today or tomorrow.  It would not be appropriate to do a petition for commitment, so the 72-hour hold can be discontinued when he is stable for discharge.  The 72 hour hold will run out Thursday morning on the 17th at 9 AM, but I do not think we need to keep him that long.            Summary of Recommendations:   Continue current withdrawal regimen  May discharge when medically stable.   Prior to discharge he should be provided with some resources to assist with sobriety if he is interested.  Page me or re-consult psychiatry as needed.     Chandler Varghese M.D.   [facility]   Contact information available via [facility] Paging/Directory.  If I am not available, then [facility] intake ([phone]) should know who   Is on call        \"Much or all of the text in this note was generated through the use of Dragon Dictate voice to text software. Errors in spelling or words which appear to be out of contact are unintentional, may be present due having escaped editing\"           "

## 2022-03-15 NOTE — PROGRESS NOTES
"Pt has been sleeping for most of the shift.  When aroused he is agitated and continues to claim that the hospital has \"kidnapped him\" and forced him to lose his job.      Took tele off to let pt take a shower-- after shower pt refused to put it back on unless he is medicated more so he can withstand the tele cords.      Pt is scoring on the CIWA when awake-- agitation and anxiety are the highest contributing factors to the CIWA score and he  Claims that these feelings come from being at the hospital.  He \"\"wouldn't need the medications if you guys let me out\".     Complains of pain on all parts of his body. Independent in the room.       "

## 2022-03-15 NOTE — PLAN OF CARE
Assumed care of patient at 0000 after patient had Code 21 called x2 this shift. Constant observer at bedside. Pt symptoms treated per ETOH protocol and CIWA scoring. Patient slept well 1480-2374. Last NOC shift CIWA 13 with elevated BP's x2. Valium given per protocol as well as PRN Clonidine.     Pt cooperative with lucid conversation.     Goal Outcome Evaluation:    Plan of Care Reviewed With: patient     Melissa Castorena RN

## 2022-03-16 ENCOUNTER — APPOINTMENT (OUTPATIENT)
Dept: ULTRASOUND IMAGING | Facility: CLINIC | Age: 37
End: 2022-03-16
Attending: INTERNAL MEDICINE
Payer: COMMERCIAL

## 2022-03-16 ENCOUNTER — APPOINTMENT (OUTPATIENT)
Dept: GENERAL RADIOLOGY | Facility: CLINIC | Age: 37
End: 2022-03-16
Attending: PEDIATRICS
Payer: COMMERCIAL

## 2022-03-16 ENCOUNTER — APPOINTMENT (OUTPATIENT)
Dept: CT IMAGING | Facility: CLINIC | Age: 37
End: 2022-03-16
Attending: PEDIATRICS
Payer: COMMERCIAL

## 2022-03-16 LAB
ALBUMIN SERPL-MCNC: 3.1 G/DL (ref 3.4–5)
ALBUMIN SERPL-MCNC: 3.1 G/DL (ref 3.4–5)
ALP SERPL-CCNC: 56 U/L (ref 40–150)
ALP SERPL-CCNC: 61 U/L (ref 40–150)
ALT SERPL W P-5'-P-CCNC: 291 U/L (ref 0–70)
ALT SERPL W P-5'-P-CCNC: 304 U/L (ref 0–70)
AMMONIA PLAS-SCNC: 31 UMOL/L (ref 10–50)
AST SERPL W P-5'-P-CCNC: 209 U/L (ref 0–45)
AST SERPL W P-5'-P-CCNC: ABNORMAL U/L
ATRIAL RATE - MUSE: 65 BPM
BASOPHILS # BLD AUTO: 0 10E3/UL (ref 0–0.2)
BASOPHILS NFR BLD AUTO: 1 %
BILIRUB DIRECT SERPL-MCNC: 0.1 MG/DL (ref 0–0.2)
BILIRUB DIRECT SERPL-MCNC: 0.1 MG/DL (ref 0–0.2)
BILIRUB SERPL-MCNC: 0.3 MG/DL (ref 0.2–1.3)
BILIRUB SERPL-MCNC: 0.4 MG/DL (ref 0.2–1.3)
DIASTOLIC BLOOD PRESSURE - MUSE: NORMAL MMHG
EOSINOPHIL # BLD AUTO: 0.4 10E3/UL (ref 0–0.7)
EOSINOPHIL NFR BLD AUTO: 8 %
ERYTHROCYTE [DISTWIDTH] IN BLOOD BY AUTOMATED COUNT: 13.3 % (ref 10–15)
HAV IGG SER QL IA: REACTIVE
HBV CORE AB SERPL QL IA: NONREACTIVE
HBV SURFACE AB SERPL IA-ACNC: 20.28 M[IU]/ML
HCT VFR BLD AUTO: 37.2 % (ref 40–53)
HCV AB SERPL QL IA: NONREACTIVE
HGB BLD-MCNC: 12.4 G/DL (ref 13.3–17.7)
HOLD SPECIMEN: NORMAL
IMM GRANULOCYTES # BLD: 0 10E3/UL
IMM GRANULOCYTES NFR BLD: 0 %
INTERPRETATION ECG - MUSE: NORMAL
LYMPHOCYTES # BLD AUTO: 2 10E3/UL (ref 0.8–5.3)
LYMPHOCYTES NFR BLD AUTO: 43 %
MCH RBC QN AUTO: 33.4 PG (ref 26.5–33)
MCHC RBC AUTO-ENTMCNC: 33.3 G/DL (ref 31.5–36.5)
MCV RBC AUTO: 100 FL (ref 78–100)
MONOCYTES # BLD AUTO: 0.7 10E3/UL (ref 0–1.3)
MONOCYTES NFR BLD AUTO: 16 %
NEUTROPHILS # BLD AUTO: 1.5 10E3/UL (ref 1.6–8.3)
NEUTROPHILS NFR BLD AUTO: 32 %
NRBC # BLD AUTO: 0 10E3/UL
NRBC BLD AUTO-RTO: 0 /100
P AXIS - MUSE: 37 DEGREES
PLATELET # BLD AUTO: 159 10E3/UL (ref 150–450)
PR INTERVAL - MUSE: 178 MS
PROT SERPL-MCNC: 6.2 G/DL (ref 6.8–8.8)
PROT SERPL-MCNC: 6.3 G/DL (ref 6.8–8.8)
QRS DURATION - MUSE: 92 MS
QT - MUSE: 456 MS
QTC - MUSE: 474 MS
R AXIS - MUSE: 15 DEGREES
RBC # BLD AUTO: 3.71 10E6/UL (ref 4.4–5.9)
SYSTOLIC BLOOD PRESSURE - MUSE: NORMAL MMHG
T AXIS - MUSE: 24 DEGREES
TROPONIN I SERPL HS-MCNC: 4 NG/L
VENTRICULAR RATE- MUSE: 65 BPM
WBC # BLD AUTO: 4.6 10E3/UL (ref 4–11)

## 2022-03-16 PROCEDURE — 93005 ELECTROCARDIOGRAM TRACING: CPT

## 2022-03-16 PROCEDURE — 86704 HEP B CORE ANTIBODY TOTAL: CPT | Performed by: INTERNAL MEDICINE

## 2022-03-16 PROCEDURE — 76705 ECHO EXAM OF ABDOMEN: CPT | Mod: 26 | Performed by: RADIOLOGY

## 2022-03-16 PROCEDURE — 76705 ECHO EXAM OF ABDOMEN: CPT

## 2022-03-16 PROCEDURE — 82248 BILIRUBIN DIRECT: CPT | Performed by: INTERNAL MEDICINE

## 2022-03-16 PROCEDURE — 71045 X-RAY EXAM CHEST 1 VIEW: CPT

## 2022-03-16 PROCEDURE — 250N000013 HC RX MED GY IP 250 OP 250 PS 637: Performed by: INTERNAL MEDICINE

## 2022-03-16 PROCEDURE — 86708 HEPATITIS A ANTIBODY: CPT | Performed by: INTERNAL MEDICINE

## 2022-03-16 PROCEDURE — 36415 COLL VENOUS BLD VENIPUNCTURE: CPT | Performed by: INTERNAL MEDICINE

## 2022-03-16 PROCEDURE — 85025 COMPLETE CBC W/AUTO DIFF WBC: CPT | Performed by: INTERNAL MEDICINE

## 2022-03-16 PROCEDURE — 82140 ASSAY OF AMMONIA: CPT | Performed by: INTERNAL MEDICINE

## 2022-03-16 PROCEDURE — 71275 CT ANGIOGRAPHY CHEST: CPT | Mod: 26 | Performed by: RADIOLOGY

## 2022-03-16 PROCEDURE — 86803 HEPATITIS C AB TEST: CPT | Performed by: INTERNAL MEDICINE

## 2022-03-16 PROCEDURE — 71045 X-RAY EXAM CHEST 1 VIEW: CPT | Mod: 26 | Performed by: RADIOLOGY

## 2022-03-16 PROCEDURE — 84484 ASSAY OF TROPONIN QUANT: CPT | Performed by: PEDIATRICS

## 2022-03-16 PROCEDURE — 120N000002 HC R&B MED SURG/OB UMMC

## 2022-03-16 PROCEDURE — 71275 CT ANGIOGRAPHY CHEST: CPT

## 2022-03-16 PROCEDURE — 93010 ELECTROCARDIOGRAM REPORT: CPT | Performed by: INTERNAL MEDICINE

## 2022-03-16 PROCEDURE — 84155 ASSAY OF PROTEIN SERUM: CPT | Performed by: INTERNAL MEDICINE

## 2022-03-16 PROCEDURE — 250N000009 HC RX 250: Performed by: INTERNAL MEDICINE

## 2022-03-16 PROCEDURE — 250N000013 HC RX MED GY IP 250 OP 250 PS 637: Performed by: NURSE PRACTITIONER

## 2022-03-16 PROCEDURE — 258N000003 HC RX IP 258 OP 636: Performed by: PHYSICIAN ASSISTANT

## 2022-03-16 PROCEDURE — 250N000011 HC RX IP 250 OP 636: Performed by: INTERNAL MEDICINE

## 2022-03-16 PROCEDURE — 250N000013 HC RX MED GY IP 250 OP 250 PS 637: Performed by: PEDIATRICS

## 2022-03-16 PROCEDURE — 99233 SBSQ HOSP IP/OBS HIGH 50: CPT | Performed by: INTERNAL MEDICINE

## 2022-03-16 PROCEDURE — 250N000013 HC RX MED GY IP 250 OP 250 PS 637: Performed by: PHYSICIAN ASSISTANT

## 2022-03-16 PROCEDURE — 86706 HEP B SURFACE ANTIBODY: CPT | Performed by: INTERNAL MEDICINE

## 2022-03-16 RX ORDER — HYDROXYZINE HYDROCHLORIDE 25 MG/1
25 TABLET, FILM COATED ORAL EVERY 6 HOURS PRN
Status: DISCONTINUED | OUTPATIENT
Start: 2022-03-16 | End: 2022-03-17 | Stop reason: HOSPADM

## 2022-03-16 RX ORDER — LISINOPRIL 10 MG/1
20 TABLET ORAL DAILY
Qty: 30 TABLET | Refills: 0 | Status: ON HOLD | OUTPATIENT
Start: 2022-03-16 | End: 2022-04-04

## 2022-03-16 RX ORDER — IOPAMIDOL 755 MG/ML
100 INJECTION, SOLUTION INTRAVASCULAR ONCE
Status: COMPLETED | OUTPATIENT
Start: 2022-03-16 | End: 2022-03-16

## 2022-03-16 RX ORDER — GABAPENTIN 100 MG/1
100 CAPSULE ORAL EVERY 8 HOURS
Status: DISCONTINUED | OUTPATIENT
Start: 2022-03-16 | End: 2022-03-16

## 2022-03-16 RX ORDER — TRAZODONE HYDROCHLORIDE 50 MG/1
50 TABLET, FILM COATED ORAL AT BEDTIME
Status: DISCONTINUED | OUTPATIENT
Start: 2022-03-16 | End: 2022-03-17 | Stop reason: HOSPADM

## 2022-03-16 RX ORDER — HYDROXYZINE HYDROCHLORIDE 50 MG/1
50 TABLET, FILM COATED ORAL EVERY 6 HOURS PRN
Status: DISCONTINUED | OUTPATIENT
Start: 2022-03-16 | End: 2022-03-17 | Stop reason: HOSPADM

## 2022-03-16 RX ORDER — LISINOPRIL 10 MG/1
10 TABLET ORAL DAILY
Status: DISCONTINUED | OUTPATIENT
Start: 2022-03-16 | End: 2022-03-16

## 2022-03-16 RX ADMIN — DIAZEPAM 10 MG: 10 TABLET ORAL at 03:04

## 2022-03-16 RX ADMIN — Medication 10 MG: at 03:02

## 2022-03-16 RX ADMIN — Medication 10 MG: at 22:28

## 2022-03-16 RX ADMIN — MULTIPLE VITAMINS W/ MINERALS TAB 1 TABLET: TAB at 08:50

## 2022-03-16 RX ADMIN — NICOTINE POLACRILEX 4 MG: 4 LOZENGE ORAL at 03:02

## 2022-03-16 RX ADMIN — SODIUM CHLORIDE 60 ML: 9 INJECTION, SOLUTION INTRAVENOUS at 05:22

## 2022-03-16 RX ADMIN — IOPAMIDOL 100 ML: 755 INJECTION, SOLUTION INTRAVENOUS at 05:22

## 2022-03-16 RX ADMIN — NICOTINE 1 PATCH: 21 PATCH, EXTENDED RELEASE TRANSDERMAL at 08:49

## 2022-03-16 RX ADMIN — FOLIC ACID 1 MG: 1 TABLET ORAL at 08:50

## 2022-03-16 RX ADMIN — OLANZAPINE 10 MG: 5 TABLET, ORALLY DISINTEGRATING ORAL at 00:23

## 2022-03-16 RX ADMIN — HYDROXYZINE HYDROCHLORIDE 50 MG: 50 TABLET, FILM COATED ORAL at 10:52

## 2022-03-16 RX ADMIN — THIAMINE HCL TAB 100 MG 100 MG: 100 TAB at 08:50

## 2022-03-16 RX ADMIN — TRAZODONE HYDROCHLORIDE 50 MG: 50 TABLET ORAL at 00:40

## 2022-03-16 RX ADMIN — METHADONE HYDROCHLORIDE 125 MG: 5 SOLUTION ORAL at 08:49

## 2022-03-16 RX ADMIN — NICOTINE POLACRILEX 4 MG: 4 LOZENGE ORAL at 00:40

## 2022-03-16 RX ADMIN — IBUPROFEN 600 MG: 600 TABLET ORAL at 00:23

## 2022-03-16 RX ADMIN — DIAZEPAM 10 MG: 10 TABLET ORAL at 22:44

## 2022-03-16 RX ADMIN — POLYETHYLENE GLYCOL 3350 17 G: 17 POWDER, FOR SOLUTION ORAL at 22:28

## 2022-03-16 RX ADMIN — LISINOPRIL 10 MG: 10 TABLET ORAL at 12:31

## 2022-03-16 RX ADMIN — TRAZODONE HYDROCHLORIDE 50 MG: 50 TABLET ORAL at 22:28

## 2022-03-16 RX ADMIN — GABAPENTIN 900 MG: 300 CAPSULE ORAL at 05:23

## 2022-03-16 RX ADMIN — SODIUM CHLORIDE, POTASSIUM CHLORIDE, SODIUM LACTATE AND CALCIUM CHLORIDE: 600; 310; 30; 20 INJECTION, SOLUTION INTRAVENOUS at 00:23

## 2022-03-16 RX ADMIN — FLUOXETINE 20 MG: 20 CAPSULE ORAL at 11:59

## 2022-03-16 RX ADMIN — LISINOPRIL 10 MG: 10 TABLET ORAL at 08:50

## 2022-03-16 ASSESSMENT — ACTIVITIES OF DAILY LIVING (ADL)
ADLS_ACUITY_SCORE: 5

## 2022-03-16 NOTE — PROVIDER NOTIFICATION
Oncall cross-over MD notified: pt reported sharp stabbing pain in left chest area. EKG, troponin, chest xray ordered.

## 2022-03-16 NOTE — PLAN OF CARE
Patient Aox4. Independent in room. Continent of bowel and bladder; LMB 3/15/21.   Per my other note at the beginning of the shift, the patient left the unit soon after 1520 and didn't come back to the unit until around 1700. He is no longer on a hold, so he is able to leave the unit, but I educated him regarding communicating when he does so. CIWA-AR score of 2. Hypertensive, but not above the limit for clonidine.   No acute changes in this four hour period.

## 2022-03-16 NOTE — PROGRESS NOTES
[facility]    Medicine Progress Note - Hospitalist Service, GOLD TEAM 18    Date of Admission:  3/13/2022    Assessment & Plan          Shahram Young is a 36 year old year old male with a history of alcohol use disorder, opiate use disorder on methadone maintenance, hypertension, diverticulitis s/p partial colectomy, who was admitted to station 3A seeking detoxification from alcohol. l. He was transferred to Internal Medicine for closer monitoring and ongoing management. On 3/13     Severe Alcohol withdrawal   Severe Alcohol use disorder  Drinking about 1 liter per day. Last drink on AM of 3/11. No history of withdrawal seizures or DTs. Worsening 3/12 with delusions, agitation, restlessness, anxiety.  Deb Naegele, [place], of Psychiatry placed the patient on a 72-hour hold effective 3/13/22 at 1200.   -still has periods of agitation and wanting to leave , has required quiet a bit of valium . was not felt to be safe to leave as was actively withdrawing and needing valium   - continue CIWA protocol, vitamins  -seen by psychiatry, not felt to be appropriate to petition for committment  And hold  can be discontinued when felt to be safe to leave , hold will run out Thursday at 9  AM   -he is alert and oriented , gait steady. He wanted to leave, he is not holdable any longer.   -He received 50 mg valium 3/15 and so fat only 10 mg at  3 AM   -he is not sure what he wants  to do in terms of his alcohol use, now willing to talk with chem dep         Opiate use disorder: On methadone maintenance of 125 mg daily.   - Pharmacy consult to confirm methadone dose   - Continue methadone 125 mg daily    chest pain  -3/16, EKG showed no acute\t ST T wave changes, troponin negative , chest pain  Resolved. States gets chest pain  Every now and then   CXR did show mediastinal widening , prelim  CTA reading  Showed no dissection       Anxiety:   - Held fluoxetine on admit as per psych and is  OK to restart now   -PRN atarax   -no suicidal ideations         Hypertension: BP elevated in the setting of acute withdrawal and agitation compounded on underlying essential hypertension.   - PTA lisinopril 10 mg daily with hold parameters, increased to 20 mg daily, follow up  with PMD    - Clonidine 0.1 mg TID PRN for SBP >170 or DBP >110     Transaminitis, improved:   --145--258--, , ALT   263--180--292--304 , with normal Tbili and alk phos. Prior labs with intermittent transaminase elevations. 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      Hx of hemorrhoids: Continue topical hydrocortisone cream.      COVID 19 screen negative 3/11      Diet: Combination Diet Regular Diet Adult    DVT Prophylaxis: Anti-embolisim stockings (TEDs)  Hedrick Catheter: Not present  Central Lines: None  Cardiac Monitoring: ACTIVE order. Indication: severe alcohol withdrawal  Code Status: Full Code           Disposition Plan   Expected Discharge: he is not holadble, now agreed to stay to monitor his  Liver, so if wanst tl kashif we cannot hold tani          The patient's care was discussed with the care team .    Johanna Barker MD  Hospitalist Service, GOLD TEAM 18  [facility]  Securely message with the Vocera Web Console (learn more here)  Text page via [employer] Paging/Directory   Please see signed in provider for up to date coverage information      Clinically Significant Risk Factors Present on Admission             # Obesity: Estimated body mass index is 34.17 kg/m  as  "calculated from the following:    Height as of 3/11/22: 1.803 m (5' 11\").    Weight as of 3/11/22: 111.1 kg (245 lb).      ______________________________________________________________________    Interval History    Events of over night noted, had chest pain , he no longer has chest pain     He states he wants to leave, he is alert and oriented x 3. Last valium was at 3 AM , he did agree to stay  For now for liver eval.         Data reviewed today: I reviewed all medications, new labs and imaging results over the last 24 hours.  Physical Exam   Vital Signs: Temp: 97.6  F (36.4  C) Temp src: Oral BP: (!) 169/100 Pulse: 62   Resp: 18 SpO2: 94 % O2 Device: None (Room air)    Weight: 0 lbs 0 oz   General appearence: in  no apparent distress    HEENT: EOMI, PEARLA, sclera nonicteric,  moist,mucus membranes,   NECK : supple  RESPIRATORY: lungs clear    CARDIOVASCULAR:S1 S2 regular rate and rhythm, no rubs gallops or murmurs appreciated  GASTROINTESTINAL:soft, non-distended , non-tender , + bowel sounds, no masses felt   SKIN: warm and dry, no mottling noted   NEUROLOGIC; awake alert and oriented x 3 , no signs of withdrawal   EXTREMITIES: no clubbing, cyanosis or edema , moves all extremity, good pedal pulses   MUSCULOSKELETAL: without deformity       Data   Recent Labs   Lab 03/16/22  0626 03/16/22  0330 03/15/22  0605 03/14/22  0952 03/14/22  0526 03/13/22  1459 03/13/22  0600 03/13/22  0505 03/12/22  1101 03/11/22  1409   WBC 4.6  --  4.4  --  5.0  --   --   --   --  7.3   HGB 12.4*  --  12.9*  --  12.1*  --   --   --   --  15.0     --  102*  --  98  --   --   --   --  96     --  128*  --  146*  --   --   --   --  223   NA  --   --  141  --   --   --  136  --   --  140   POTASSIUM  --   --  4.0 3.6  --  4.1 3.9  --    < > 3.1*   CHLORIDE  --   --  111*  --   --   --  102  --   --  100   CO2  --   --  25  --   --   --  24  --   --  30   BUN  --   --  12  --   --   --  15  --   --  13   CR  --   --  0.71  " --   --   --  0.67  --   --  0.79   ANIONGAP  --   --  5  --   --   --  10  --   --  10   LINDA  --   --  8.9  --   --   --  10.2*  --   --  9.4   GLC  --   --  91  --   --   --  95 102*  --  175*   ALBUMIN  --  3.1* 3.1*  --   --   --  3.7  --   --  4.2   PROTTOTAL  --  6.3* 6.4*  --   --   --  7.0  --   --  7.7   BILITOTAL  --  0.4 0.6  --   --   --  1.0  --   --  0.8   ALKPHOS  --  61 60  --   --   --  70  --   --  82   ALT  --  304* 292*  --   --   --  180*  --   --  263*   AST  --   --  258*  --   --   --  145*  --   --  364*   LIPASE  --   --   --   --   --   --   --   --   --  100    < > = values in this interval not displayed.     Recent Results (from the past 24 hour(s))   XR Chest Port 1 View    Impression    RESIDENT PRELIMINARY INTERPRETATION  IMPRESSION: Compared to the 8/12/2011 radiographs, there is widening  of the mediastinum with a new convex contour of the descending  thoracic aorta. This is suspicious for aortic dissection or other  acute mediastinal pathology. Recommend CTA of the chest.    [Result: Suspicion for aortic dissection]    Finding was identified on 3/16/2022 3:36 AM.     Dr. Myers was contacted by Carlos Lozano on 3/16/2022 3:53 AM and  verbalized understanding of the result.   CTA Chest with Contrast    Impression    RESIDENT PRELIMINARY INTERPRETATION  Impression:  1. No acute aortic or mediastinal pathology.  2. Physiologic fluid in the pericardial recesses and a mildly tortuous  descending thoracic aorta are likely explanation for the appearance of  the mediastinum on the prior radiograph.  3. Hepatomegaly and diffuse hepatic steatosis.

## 2022-03-16 NOTE — PROGRESS NOTES
"CLINICAL NUTRITION SERVICES - BRIEF NOTE    REASON FOR ASSESSMENT  Shahram Young is a/an 36 year old male assessed by the dietitian for Positive Admission Nutrition Risk Screen (unknown weight loss)      FINDINGS   Chart review only today. Pt admits to hospital in the setting of severe ETOH withdrawal and detox. Other past medical history noted for alcohol use disorder, opiate use disorder on methadone maintenance, hypertension, diverticulitis s/p partial colectomy. Pt is on a regular diet taking 100% of meals/has good appetite per flow sheets. MAR reviewed, pt is on appropriate supplementation for above diagnosis. Labs reviewed. Providers note pt may want to be discharged soon.     Ht Readings from Last 1 Encounters:   03/23/21 1.8 m (5' 10.87\")     Wt Readings from Last 20 Encounters:   03/10/22 111.1 kg (245 lb)   01/12/22 123.2 kg (271 lb 9.6 oz)     BMI Readings from Last 1 Encounters:   03/10/22 34.30 kg/m      Weight assessment: No current weight in chart to better assess trends and significantly different weights noted above, BMI classified as obesity Grade I.     MONITORING/EVALUATION  Unsure of accuracy of screen, as pt appears to be taking meals well at present, RD has no suggestions for intervention and will monitor per LOS protocol unless otherwise consulted.    Cindy Waller RD, CNSC, LD  8A RD pager: [phone]         "

## 2022-03-16 NOTE — PLAN OF CARE
"BP (!) 169/100 (BP Location: Left arm)   Pulse 62   Temp 97.6  F (36.4  C) (Oral)   Resp 18   SpO2 94%      Pt alert and oriented, anxious, and seeking pain medication frequently and insistently- offered tylenol, ibuprofen, pt continued to ask for pain medication,   Zyprexa, melantonin, trazadone and valium for CIWA 9 given according to pt \"doesn't do anything for me\", pt was witnessed by sitter and writer to sleep for several hours but according to pt \"I didn't sleep a wink\"   Pt was noticed to sleep talk frequently, rambling at times when awake  Upon assessment pt reported \"sharp stabbing pain\" in left chest area- cross over MD notified, EKG, troponin, chest xray and chest CT ordered and completed,   Up independently ambulating in room and to BR  Voiding frequently   PIV infusing per pt request,   NPO for US this am,   1:1 attendant in the room for 72 hour hold which will  @ 0900 this am  Rounded per unit routine, continue w/POC                      "

## 2022-03-16 NOTE — PROGRESS NOTES
Medicine Cross Cover    Patient with acute onset left sided chest pain, EKG, troponin and CXR ordered. EKG and troponin were normal. CXR with concern for change in mediastinum and tortuous aorta therefore pursued CTA which did not show dissection.    Shahram Myers MD

## 2022-03-16 NOTE — CARE PLAN
Writer went to patient room at 15:30 to assess patient, but patient was not in room. Writer was told in report that he will sometimes leave the unit to smoke, but patient did not make staff aware of this intention. Checked room every 10 minutes for 20 minutes, but patient had not yet returned.    Patient returned to unit at 17:15 for dinner. Education provided regarding informing the nursing staff when patient leaves the unit.

## 2022-03-16 NOTE — CONSULTS
3/16/2022  I spoke with pt. He stated he completed his a CINTHYA CA on Tuesday with his counselor, Connie, at his methadone clinic.  He stated he signed ROIs with her for his CINTHYA CA to be faxed out to the tx programs. He is going to follow up with Connie about tx placement.    Allyson Arellano MA [facility]  [phone]

## 2022-03-16 NOTE — PROGRESS NOTES
[facility]    Medicine Progress Note - Hospitalist Service, GOLD TEAM 18    Date of Admission:  3/13/2022    Assessment & Plan          Shahram Young is a 36 year old year old male with a history of alcohol use disorder, opiate use disorder on methadone maintenance, hypertension, diverticulitis s/p partial colectomy, who was admitted to station 3A seeking detoxification from alcohol. l. He was transferred to Internal Medicine for closer monitoring and ongoing management. On 3/13     Severe Alcohol withdrawal   Severe Alcohol use disorder  Drinking about 1 liter per day. Last drink on AM of 3/11. No history of withdrawal seizures or DTs. Worsening 3/12 with delusions, agitation, restlessness, anxiety.  Deb Naegele, [place], of Psychiatry placed the patient on a 72-hour hold effective 3/13/22 at 1200.   -still has periods of agitation and wanting to leave , has required quiet a bit of valium . was not felt to be safe to leave as was actively withdrawing and needing valium   - continue CIWA protocol, vitamins  -seen by psychiatry, not felt to be appropriate to petition for committment  And hold  can be discontinued when felt to be safe to leave , hold will run out Thursday at 9  AM       Opiate use disorder: On methadone maintenance of 125 mg daily.   - Pharmacy consult to confirm methadone dose   - Continue methadone 125 mg daily     Anxiety:   - Hold fluoxetine for now as per psych        Hypertension: BP elevated in the setting of acute withdrawal and agitation compounded on underlying essential hypertension.   - PTA lisinopril 10 mg daily with hold parameters   - Clonidine 0.1 mg TID PRN for SBP >170 or DBP >110     Transaminitis, improved:   ,  on admission with normal Tbili and alk phos. Prior labs with intermittent transaminase elevations. 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 Asymptomatic. Labs improved but now up again, will check abdominal US  "in AM      Tobacco use disorder: Nicotine patch + lozenges.      Hx of hemorrhoids: Continue topical hydrocortisone cream.           Diet: Combination Diet Regular Diet Adult    DVT Prophylaxis: Anti-embolisim stockings (TEDs)  Hedrick Catheter: Not present  Central Lines: None  Cardiac Monitoring: ACTIVE order. Indication: severe alcohol withdrawal  Code Status: Full Code           Disposition Plan   Expected Discharge:next 1-2 days once no longer withdrawing          The patient's care was discussed with the care team .    Johanna Barker MD  Hospitalist Service, GOLD TEAM 18  [facility]  Securely message with the Vocera Web Console (learn more here)  Text page via [facility] Paging/Directory   Please see signed in provider for up to date coverage information      Clinically Significant Risk Factors Present on Admission             # Obesity: Estimated body mass index is 34.17 kg/m  as calculated from the following:    Height as of 3/11/22: 1.803 m (5' 11\").    Weight as of 3/11/22: 111.1 kg (245 lb).      ______________________________________________________________________    Interval History    Was up a lot at night , was sleeping when I went to  him but woke up, was groggy but able to answer questions        Data reviewed today: I reviewed all medications, new labs and imaging results over the last 24 hours.  Physical Exam   Vital Signs: Temp: (!) 96.5  F (35.8  C) Temp src: Oral BP: (!) 141/89 Pulse: 70   Resp: 18 SpO2: 96 % O2 Device: None (Room air)    Weight: 0 lbs 0 oz   General appearence: in  no apparent distress    HEENT: EOMI, PEARLA, sclera nonicteric,  moist,mucus membranes,   NECK : supple  RESPIRATORY: lungs clear    CARDIOVASCULAR:S1 S2 regular rate and rhythm, no rubs gallops or murmurs appreciated  GASTROINTESTINAL:soft, non-distended , non-tender , + bowel sounds, no masses felt   SKIN: warm and dry, no mottling noted   NEUROLOGIC; was sleeping when I " went into room, woke up, speech soft , groggy, was oriented to place time self   EXTREMITIES: no clubbing, cyanosis or edema , moves all extremity, good pedal pulses   MUSCULOSKELETAL: without deformity       Data   Recent Labs   Lab 03/15/22  0605 03/14/22  0952 03/14/22  0526 03/13/22  1459 03/13/22  0600 03/13/22  0505 03/12/22  1101 03/11/22  1409   WBC 4.4  --  5.0  --   --   --   --  7.3   HGB 12.9*  --  12.1*  --   --   --   --  15.0   *  --  98  --   --   --   --  96   *  --  146*  --   --   --   --  223     --   --   --  136  --   --  140   POTASSIUM 4.0 3.6  --  4.1 3.9  --    < > 3.1*   CHLORIDE 111*  --   --   --  102  --   --  100   CO2 25  --   --   --  24  --   --  30   BUN 12  --   --   --  15  --   --  13   CR 0.71  --   --   --  0.67  --   --  0.79   ANIONGAP 5  --   --   --  10  --   --  10   LINDA 8.9  --   --   --  10.2*  --   --  9.4   GLC 91  --   --   --  95 102*  --  175*   ALBUMIN 3.1*  --   --   --  3.7  --   --  4.2   PROTTOTAL 6.4*  --   --   --  7.0  --   --  7.7   BILITOTAL 0.6  --   --   --  1.0  --   --  0.8   ALKPHOS 60  --   --   --  70  --   --  82   *  --   --   --  180*  --   --  263*   *  --   --   --  145*  --   --  364*   LIPASE  --   --   --   --   --   --   --  100    < > = values in this interval not displayed.     No results found for this or any previous visit (from the past 24 hour(s)).

## 2022-03-17 VITALS
TEMPERATURE: 96.2 F | SYSTOLIC BLOOD PRESSURE: 147 MMHG | HEART RATE: 64 BPM | OXYGEN SATURATION: 99 % | DIASTOLIC BLOOD PRESSURE: 91 MMHG | RESPIRATION RATE: 18 BRPM

## 2022-03-17 LAB
ALBUMIN SERPL-MCNC: 3.3 G/DL (ref 3.4–5)
ALP SERPL-CCNC: 59 U/L (ref 40–150)
ALT SERPL W P-5'-P-CCNC: 271 U/L (ref 0–70)
AST SERPL W P-5'-P-CCNC: 143 U/L (ref 0–45)
BILIRUB DIRECT SERPL-MCNC: 0.1 MG/DL (ref 0–0.2)
BILIRUB SERPL-MCNC: 0.4 MG/DL (ref 0.2–1.3)
PROT SERPL-MCNC: 6.6 G/DL (ref 6.8–8.8)

## 2022-03-17 PROCEDURE — 250N000013 HC RX MED GY IP 250 OP 250 PS 637: Performed by: INTERNAL MEDICINE

## 2022-03-17 PROCEDURE — 99239 HOSP IP/OBS DSCHRG MGMT >30: CPT | Performed by: INTERNAL MEDICINE

## 2022-03-17 PROCEDURE — 36415 COLL VENOUS BLD VENIPUNCTURE: CPT | Performed by: INTERNAL MEDICINE

## 2022-03-17 PROCEDURE — 250N000013 HC RX MED GY IP 250 OP 250 PS 637: Performed by: PHYSICIAN ASSISTANT

## 2022-03-17 PROCEDURE — 99232 SBSQ HOSP IP/OBS MODERATE 35: CPT | Performed by: PSYCHIATRY & NEUROLOGY

## 2022-03-17 PROCEDURE — 82040 ASSAY OF SERUM ALBUMIN: CPT | Performed by: INTERNAL MEDICINE

## 2022-03-17 RX ORDER — GABAPENTIN 300 MG/1
600 CAPSULE ORAL 3 TIMES DAILY
Status: DISCONTINUED | OUTPATIENT
Start: 2022-03-17 | End: 2022-03-17 | Stop reason: HOSPADM

## 2022-03-17 RX ORDER — GABAPENTIN 300 MG/1
600 CAPSULE ORAL 3 TIMES DAILY
Qty: 120 CAPSULE | Refills: 0 | Status: ON HOLD | OUTPATIENT
Start: 2022-03-17 | End: 2022-04-04

## 2022-03-17 RX ADMIN — DIAZEPAM 10 MG: 10 TABLET ORAL at 08:23

## 2022-03-17 RX ADMIN — FOLIC ACID 1 MG: 1 TABLET ORAL at 09:48

## 2022-03-17 RX ADMIN — LISINOPRIL 10 MG: 10 TABLET ORAL at 08:21

## 2022-03-17 RX ADMIN — THIAMINE HCL TAB 100 MG 100 MG: 100 TAB at 08:22

## 2022-03-17 RX ADMIN — MULTIPLE VITAMINS W/ MINERALS TAB 1 TABLET: TAB at 08:21

## 2022-03-17 RX ADMIN — NICOTINE POLACRILEX 4 MG: 4 LOZENGE ORAL at 15:32

## 2022-03-17 RX ADMIN — FLUOXETINE 20 MG: 20 CAPSULE ORAL at 08:21

## 2022-03-17 RX ADMIN — METHADONE HYDROCHLORIDE 125 MG: 5 SOLUTION ORAL at 10:00

## 2022-03-17 RX ADMIN — HYDROXYZINE HYDROCHLORIDE 50 MG: 50 TABLET, FILM COATED ORAL at 14:34

## 2022-03-17 ASSESSMENT — ACTIVITIES OF DAILY LIVING (ADL)
ADLS_ACUITY_SCORE: 5

## 2022-03-17 NOTE — CONSULTS
"          Psychiatry Consultation; Follow up              Reason for Consult, requesting source:    Anxiety   Requesting source: Radha Lobato    Labs and imaging reviewed    This interview was conducted by telephone. Permission from the patient to conduct the exam by phone was obtained prior to proceeding.  HE was also informed that insurance will be billed for this contact. Length of consultation was 20 min.              Interim history:    He has now completed his alcohol withdrawal and is ready for discharge.  However, he wanted to see psychiatry again regarding his  longstanding social phobia.  This is been a problem with him since childhood and he has tried majority of the SSRIs off and on over the years since his teens.  None of them helped or had side effects.  He said that the only thing that works for his anxiety is either alcohol or Klonopin.  He denies any significant depressive symptoms at this time, not hopeless or suicidal.        Current Medications:       FLUoxetine  20 mg Oral Daily     folic acid  1 mg Oral Daily     lisinopril  10 mg Oral Daily     methadone  125 mg Oral Daily     multivitamin w/minerals  1 tablet Oral Daily     nicotine  1 patch Transdermal Daily     nicotine   Transdermal Q8H     polyethylene glycol  17 g Oral Daily     sodium chloride (PF)  3 mL Intracatheter Q8H     thiamine  100 mg Oral Daily     traZODone  50 mg Oral At Bedtime            MSE:   He is pleasant, cooperative. Speech fluent.. Associations tight. Mood is \"fair.\". Thought process logical, linear. Thought content negative for suicidal thoughts or delusions. Orientation, recent and remote memory, attention span and concentration are not formally assessed (he was in a hurry). Fund of knowledge, use of language probably appropriate. Insight and judgment fair.       Vital signs:  Temp: (!) 96.2  F (35.7  C) Temp src: Oral BP: (!) 147/91 Pulse: 64   Resp: 18 SpO2: 99 % O2 Device: None (Room air)        Estimated " "body mass index is 34.17 kg/m  as calculated from the following:    Height as of 3/11/22: 1.803 m (5' 11\").    Weight as of 3/11/22: 111.1 kg (245 lb).            DSM-5 Diagnosis:   300.23 (F40.10) Social Anxiety Disorder   Alcohol use disorder and withdrawal   Opioid use disorder on maintenance          Assessment:   The best approach to anxiety disorders is an SSRI and cognitive behavioral therapy.  Gabapentin should help anxiety somewhat and does enhance chances of sobriety.  I do not consider substance use an absolute contraindication to use of benzodiazepines, but I explained to him that these would be prescribed only by someone who was following him closely over the long-term.  The expectation would be that he was also involved in active recovery efforts.          Summary of Recommendations:   I would increase his Prozac to 40 mg/day and discharge him on gabapentin 600 mg 3 times per day  I suggested that he has the methadone clinic suggest some place that could follow him long-term for psychiatric treatment.   Chandler Varghese M.D.   [facility]   Contact information available via [facility] Paging/Directory  If I am not available, then [facility] CL line ([phone]) should know who   Is covering our consult service.          \"Much or all of the text in this note was generated through the use of Dragon Dictate voice to text software. Errors in spelling or words which appear to be out of contact are unintentional, may be present due having escaped editing\"           "
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
## 2022-03-17 NOTE — DISCHARGE SUMMARY
[facility]  Hospitalist Discharge Summary      Date of Admission:  3/13/2022  Date of Discharge:  3/17/2022   Discharging Provider: Johanna Barker MD  Discharge Service: Hospitalist Service, GOLD TEAM 18    Discharge Diagnoses   Severe Alcohol withdrawal   Severe Alcohol use disorder  Opiate use disorder  chest pain   Anxiety  Hypertension   transaminitis   Tobacco use disorder    Follow-ups Needed After Discharge   Follow-up Appointments     Adult [facility] Follow-up and recommended labs and tests      Follow up with primary care provider, Physician No Ref-Primary, within 7   days for hospital follow- up.  The following labs/tests are recommended:   LFT .      Appointments on [place] and/or [facility] (with [facility] or [facility]   provider or service). Call [phone] if you haven't heard regarding   these appointments within 7 days of discharge.             Unresulted Labs Ordered in the Past 30 Days of this Admission     No orders found from 2/11/2022 to 3/14/2022.          Discharge Disposition   Discharged to home  Condition at discharge: Stable      Hospital Course      Shahram Young is a 36 year old year old male with a history of alcohol use disorder, opiate use disorder on methadone maintenance, hypertension, diverticulitis s/p partial colectomy, who was admitted to station 3A seeking detoxification from alcohol. l. He was transferred to Internal Medicine for closer monitoring and ongoing management. On 3/13     Severe Alcohol withdrawal   Severe Alcohol use disorder  Drinking about 1 liter per day. Last drink on AM of 3/11. No history of withdrawal seizures or DTs. Worsening 3/12 with delusions, agitation, restlessness, anxiety.  Deb Naegele, CNP, of Psychiatry placed the patient on a 72-hour hold effective 3/13/22 at 1200. Psychiatry did not feel  Patient  To be appropriate to petition for committment   -he now has been doing ok, hold was lifted 3/16 and  he stayed voluntarily   - chem dep did speak with patient          Opiate use disorder: On methadone maintenance of 125 mg daily.      chest pain  -3/16, EKG showed no acute\t ST T wave changes, troponin negative , chest pain  Resolved. States gets chest pain  Every now and then   CXR did show mediastinal widening , prelim  CTA reading  Showed no dissection         Anxiety:   - Held fluoxetine on admit as per psych and is OK to restart, now patient  complained of significant anxiety, was seen by psychiatrist on day of discharge  And was recommended to increase fluoxetien to 60 mg a day and neurontin 600 mg po tid   As this should help with anxiety and and enhance sobriety  And did not recommend any benzodiazepines  -no suicidal ideations         Hypertension: BP elevated in the setting of acute withdrawal and agitation compounded on underlying essential hypertension.   - PTA lisinopril 10 mg daily with hold parameters, increased to 20 mg daily, follow up  with PMD         Transaminitis, improved:   --145--258- 209--143 , ALT   263--180--292--304--291--271 , with normal Tbili and alk phos. Prior labs with intermittent transaminase elevations. 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      Hx of hemorrhoids: Continue topical hydrocortisone cream.      COVID 19 screen negative 3/11            Consultations This Hospital Stay   CARE MANAGEMENT / SOCIAL WORK IP CONSULT  CHEMICAL DEPENDENCY IP CONSULT  PSYCHIATRY IP CONSULT  MEDICATION HISTORY IP PHARMACY CONSULT  PSYCHIATRY IP CONSULT  PSYCHIATRY IP CONSULT  GI  HEPATOLOGY ADULT IP CONSULT  PSYCHIATRY IP CONSULT    Code Status   Full Code    Time Spent on this Encounter   I, Johanna Barker MD, personally saw the patient today and spent greater than 30 minutes discharging this patient.       Johanna Barker MD  [facility] [address]  [facility] [address]  Phone: [phone]  Fax: [phone]  ______________________________________________________________________    Physical Exam   Vital Signs: Temp: (!) 96.2  F (35.7  C) Temp src: Oral BP: (!) 147/91 Pulse: 64   Resp: 18 SpO2: 99 % O2 Device: None (Room air)    Weight: 0 lbs 0 oz   General appearence: awake alert   no apparent distress    HEENT: EOMI, PEARLA, sclera nonicteric,  moist,  mucus membranes,   NECK : supple  RESPIRATORY: lungs clear to auscultation bilateral,  no wheezing or crackles   CARDIOVASCULAR:S1 S2 regular rate and rhythm, no rubs gallops or murmurs appreciated  GASTROINTESTINAL:soft, non-distended , non-tender , + bowel sounds, no masses felt   SKIN: warm and dry, no mottling noted   NEUROLOGIC; awake alert and oriented, no focal deficits found  EXTREMITIES: no clubbing, cyanosis or edema , moves all extremity, good pedal pulses   MUSCULOSKELETAL: without deformity          Primary Care Physician   Physician No Ref-Primary    Discharge Orders      Reason for your hospital stay    Alcohol withdrawal     Activity    Your activity upon discharge:  as tolerated     Adult [facility] Follow-up and recommended labs and tests    Follow up with primary care provider, Physician No Ref-Primary, within 7 days for hospital follow- up.  The following labs/tests are recommended: LFT .      Appointments on [place] and/or [place] campus (with [facility] or [facility] provider or service). Call [phone] if you haven't heard regarding these appointments within 7 days of discharge.     Diet    Follow this diet upon discharge:  as tolerated       Significant Results and Procedures   Most Recent 3 CBC's:Recent  Labs   Lab Test 03/16/22  0626 03/15/22  0605 03/14/22  0526   WBC 4.6 4.4 5.0   HGB 12.4* 12.9* 12.1*    102* 98    128* 146*     Most Recent 3 BMP's:Recent Labs   Lab Test 03/15/22  0605 03/14/22  0952 03/13/22  1459 03/13/22  0600 03/13/22  0505 03/12/22  1101 03/11/22  1409     --   --  136  --   --  140   POTASSIUM 4.0 3.6 4.1 3.9  --    < > 3.1*   CHLORIDE 111*  --   --  102  --   --  100   CO2 25  --   --  24  --   --  30   BUN 12  --   --  15  --   --  13   CR 0.71  --   --  0.67  --   --  0.79   ANIONGAP 5  --   --  10  --   --  10   LINDA 8.9  --   --  10.2*  --   --  9.4   GLC 91  --   --  95 102*  --  175*    < > = values in this interval not displayed.     Most Recent 2 LFT's:Recent Labs   Lab Test 03/17/22  1027 03/16/22  0752   * 209*   * 291*   ALKPHOS 59 56   BILITOTAL 0.4 0.3   ,      Ref. Range 3/16/2022 06:26 3/16/2022 07:52   Hepatitis A Antibody IgG Latest Ref Range: Nonreactive   Reactive (A)   Hepatitis B Core Carmen Latest Ref Range: Nonreactive  Nonreactive    Hepatitis B Surface Antibody Latest Ref Range: >=12.00 m[IU]/mL 20.28    Hepatitis C Antibody Latest Ref Range: Nonreactive  Nonreactive      Results for orders placed or performed during the hospital encounter of 03/13/22   US Abdomen Limited ([facility] only)    Narrative    EXAMINATION: US ABDOMEN LIMITED  3/16/2022 9:20 AM      CLINICAL HISTORY: Elevated LFT's    COMPARISON: None    FINDINGS:  The liver is diffusely increased in echogenicity.  There is no  intrahepatic or extrahepatic biliary ductal dilatation.  The common  bile duct measures 5 mm.      The gallbladder is normal, without gallstones, wall thickening, or  pericholecystic fluid. There is a negative sonographic Beltran?s sign.    The pancreas is echogenic.     The right kidney measures 11.9 cm in length. Suspect at least partial  duplication of the right renal collecting system. The right kidney is  normal in echogenicity without  calcification or mass lesion  visualized.       Impression    IMPRESSION:  Hepatic steatosis. Pancreatic atrophy with fatty replacement.    MAJO CAVAZOS MD         SYSTEM ID:  [id number]   XR Chest Port 1 View    Narrative    EXAM: XR CHEST PORT 1 VIEW  3/16/2022 3:27 AM     HISTORY:  Chest pain       COMPARISON:  The 12th 2011    TECHNIQUE: Portable upright AP radiograph of the chest.    FINDINGS: Midline trachea. Compared with the eighth 12/20/2011  radiographs, there is increased width and density to the mediastinum  with a new convex contour to the left margin of the descending  thoracic aorta. Of note, this is an AP sitting radiograph compared to  previous PA radiograph. No focal airspace opacity. The right  costophrenic angle is collimated from the field-of-view. No large  pleural effusion. No pneumothorax. No acute abnormality of the  visualized bones, soft tissues, and upper abdomen.      Impression    IMPRESSION: Compared to the 8/12/2011 radiographs, there is widening  of the mediastinum with a new convex contour of the descending  thoracic aorta. Some of this may be projectional given different  radiographic technique. This can also be seen with aortic dissection  or other acute mediastinal pathology. Recommend CTA of the chest.    [Result: Suspicion for aortic dissection]    Finding was identified on 3/16/2022 3:36 AM.     Dr. Myers was contacted by Carlos Lozano on 3/16/2022 3:53 AM and  verbalized understanding of the result.    I have personally reviewed the examination and initial interpretation  and I agree with the findings.    ARTEMIO MALDONADO MD         SYSTEM ID:  [id number]   CTA Chest with Contrast    Narrative    CTA CHEST WITH 3D AND MULTIPLANAR RECONSTRUCTIONS 3/16/2022 5:28 AM    CLINICAL HISTORY: Aortic disease, nontraumatic; Acute onset left chest  pain, CXR changes.    COMPARISONS: Chest x-ray 3/16/2022    REFERRING PROVIDER: GAY MYERS    TECHNIQUE: Following the uneventful  administration of intravenous  contrast, CTA performed through the chest. Coronal and sagittal  reconstructions produced. Lung MIP produced.     3D and multiplanar reconstructions were produced and reviewed.    A later venous rather than arterial phase was captured. The bolus  tracking did not trigger by 50 seconds and the scan was triggered  manually.    CONTRAST: 99 mL Isovue 370    DOSE (DLP): 817 mGy*cm    FINDINGS: CTA: No aortic stenosis or dissection. Normal aortic  branching pattern. Great arteries patent.    Aortic measurements:       Sinuses of Valsalva: 42 mm       Sinotubular junction: 38 mm       Ascendin mm       Arch: 33 mm       Proximal descendin mm       Mid descendin mm       Diaphragm: 25 mm    Visualized celiac, superior mesenteric, and renal arteries are patent.    CT:  Mediastinum: Normal thyroid. No mediastinal lymphadenopathy. Normal  heart size. No pericardial effusion. Mild coronary artery  calcifications. Enhancing nodularity anterior to the distal esophagus.    Fluid in the superior aortic and pulmonic recesses.     Lungs: No consolidations. The central tracheobronchial tree is clear.  The pleural spaces are clear.    UPPER ABDOMEN:  Diffuse hepatic steatosis. The liver is enlarged measuring 19 cm in  the craniocaudal dimension at the mid clavicular line. Area of  decreased attenuation along the falciform ligament, likely focal fatty  infiltration. The gallbladder is partially decompressed. No calcified  stones are biliary ductal dilation.    The spleen, pancreas, adrenals, and kidneys have a normal arterial  phase appearance within the field of view. No ascites. No  lymphadenopathy.    No acute or suspicious osseous abnormality. Chronic fracture deformity  of the right posterior ribs nine and 10. Mild left gynecomastia.      Impression    IMPRESSION:  1. No acute aortic or mediastinal pathology. Ascending aorta measures  40 mm in diameter.    2. Physiologic fluid in the  pericardial recesses and a mildly tortuous  descending thoracic aorta are the likely explanation for the  appearance of the mediastinum on the prior radiograph.    3. Hepatomegaly and diffuse hepatic steatosis.    I have personally reviewed the examination and initial interpretation  and I agree with the findings.    ABDELRAHMAN FENG MD         SYSTEM ID:  [id number]       Discharge Medications   Current Discharge Medication List      START taking these medications    Details   gabapentin (NEURONTIN) 300 MG capsule Take 2 capsules (600 mg) by mouth 3 times daily  Qty: 120 capsule, Refills: 0    Associated Diagnoses: Alcohol dependence with unspecified alcohol-induced disorder (H)         CONTINUE these medications which have CHANGED    Details   FLUoxetine (PROZAC) 20 MG capsule Take 3 capsules (60 mg) by mouth daily  Qty: 30 capsule, Refills: 0    Associated Diagnoses: Anxiety disorder, unspecified type      lisinopril (ZESTRIL) 10 MG tablet Take 2 tablets (20 mg) by mouth daily  Qty: 30 tablet, Refills: 0    Associated Diagnoses: Alcohol withdrawal syndrome without complication (H)         CONTINUE these medications which have NOT CHANGED    Details   folic acid (FOLVITE) 1 MG tablet Take 1 tablet (1 mg) by mouth daily  Qty: 30 tablet, Refills: 1    Associated Diagnoses: Alcohol abuse      methadone (DOLPHINE) 5 MG/5ML solution Take 125 mg by mouth daily       multivitamin, therapeutic (THERA-VIT) TABS tablet Take 1 tablet by mouth daily  Qty: 30 tablet, Refills: 0    Associated Diagnoses: Alcohol withdrawal syndrome without complication (H)      naproxen (NAPROSYN) 500 MG tablet Take 1 tablet (500 mg) by mouth 2 times daily as needed for moderate pain  Qty: 60 tablet, Refills: 1    Associated Diagnoses: Chronic pain of both shoulders      nicotine (NICODERM CQ) 21 MG/24HR 24 hr patch Place 1 patch onto the skin daily  Qty: 30 patch, Refills: 0    Associated Diagnoses: Alcohol withdrawal syndrome without complication (H)       thiamine (B-1) 100 MG tablet Take 1 tablet (100 mg) by mouth daily  Qty: 30 tablet, Refills: 0    Associated Diagnoses: Alcohol withdrawal syndrome without complication (H)           Allergies   Allergies   Allergen Reactions     Pcn [Penicillins]      Possibly rash noted-- unsure     Pcn [Penicillins]      Sulfa Drugs      Possibly rash - uncertain     Sulfa Drugs

## 2022-03-17 NOTE — PROGRESS NOTES
D: Pt  A/O x4. VSS . Lungs- CL, no c/o chest pain/ SOB. sats= 95 % RA.  . Bowel+. PP- +. +1 edema- feet/legs . CMS- intact baseline N/T neet. Pt taking PO REG food/ fluids well. Voiding Good amounts. Pain/CAPA - denies. All discharge meds/ instructions reviewed with pt and belongings from security returned to pt/ there was a discrepancy as to some CASH ( $120??) that pt stated he came in with on FRI? Security and room search did not produce. Secirity/ and ED contacted Pt given pt rep #.   A: con't to monitor. Call light in reach. Pt able to make needs known.Pt DCd to home at this posted time.

## 2022-03-17 NOTE — PROGRESS NOTES
To Whom it May Concern,    Mr. Shahram Young  Has been hospitalized 3/11/2022- 3/17/2022.    Johanna Barker MD
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
## 2022-03-31 ENCOUNTER — TELEPHONE (OUTPATIENT)
Dept: BEHAVIORAL HEALTH | Facility: CLINIC | Age: 37
End: 2022-03-31

## 2022-03-31 ENCOUNTER — HOSPITAL ENCOUNTER (INPATIENT)
Facility: CLINIC | Age: 37
LOS: 5 days | Discharge: PSYCHIATRIC OR SUBSTANCE USE FACILITY | End: 2022-04-05
Attending: FAMILY MEDICINE | Admitting: PSYCHIATRY & NEUROLOGY
Payer: COMMERCIAL

## 2022-03-31 DIAGNOSIS — F19.10 POLYSUBSTANCE ABUSE (H): ICD-10-CM

## 2022-03-31 DIAGNOSIS — F10.229 ALCOHOL DEPENDENCE WITH INTOXICATION WITH COMPLICATION (H): ICD-10-CM

## 2022-03-31 DIAGNOSIS — F10.10 ALCOHOL ABUSE: ICD-10-CM

## 2022-03-31 DIAGNOSIS — F41.9 ANXIETY DISORDER, UNSPECIFIED TYPE: ICD-10-CM

## 2022-03-31 DIAGNOSIS — Z20.822 CONTACT WITH AND (SUSPECTED) EXPOSURE TO COVID-19: ICD-10-CM

## 2022-03-31 DIAGNOSIS — F10.29 ALCOHOL DEPENDENCE WITH UNSPECIFIED ALCOHOL-INDUCED DISORDER (H): ICD-10-CM

## 2022-03-31 DIAGNOSIS — F10.930 ALCOHOL WITHDRAWAL SYNDROME WITHOUT COMPLICATION (H): ICD-10-CM

## 2022-03-31 LAB
ALBUMIN SERPL-MCNC: 4 G/DL (ref 3.4–5)
ALCOHOL BREATH TEST: 0.17 (ref 0–0.01)
ALP SERPL-CCNC: 87 U/L (ref 40–150)
ALT SERPL W P-5'-P-CCNC: 145 U/L (ref 0–70)
AMPHETAMINES UR QL SCN: ABNORMAL
AMPHETAMINES UR QL SCN: ABNORMAL
ANION GAP SERPL CALCULATED.3IONS-SCNC: 10 MMOL/L (ref 3–14)
AST SERPL W P-5'-P-CCNC: 145 U/L (ref 0–45)
BARBITURATES UR QL: ABNORMAL
BARBITURATES UR QL: ABNORMAL
BASOPHILS # BLD AUTO: 0.2 10E3/UL (ref 0–0.2)
BASOPHILS NFR BLD AUTO: 2 %
BENZODIAZ UR QL: ABNORMAL
BENZODIAZ UR QL: ABNORMAL
BILIRUB SERPL-MCNC: 0.6 MG/DL (ref 0.2–1.3)
BUN SERPL-MCNC: 8 MG/DL (ref 7–30)
CALCIUM SERPL-MCNC: 9.5 MG/DL (ref 8.5–10.1)
CANNABINOIDS UR QL SCN: ABNORMAL
CANNABINOIDS UR QL SCN: ABNORMAL
CHLORIDE BLD-SCNC: 98 MMOL/L (ref 94–109)
CO2 SERPL-SCNC: 28 MMOL/L (ref 20–32)
COCAINE UR QL: ABNORMAL
COCAINE UR QL: ABNORMAL
CREAT SERPL-MCNC: 0.56 MG/DL (ref 0.66–1.25)
EOSINOPHIL # BLD AUTO: 0.1 10E3/UL (ref 0–0.7)
EOSINOPHIL NFR BLD AUTO: 1 %
ERYTHROCYTE [DISTWIDTH] IN BLOOD BY AUTOMATED COUNT: 13.2 % (ref 10–15)
ETHANOL UR QL SCN: ABNORMAL
GFR SERPL CREATININE-BSD FRML MDRD: >90 ML/MIN/1.73M2
GLUCOSE BLD-MCNC: 95 MG/DL (ref 70–99)
HCT VFR BLD AUTO: 45.9 % (ref 40–53)
HGB BLD-MCNC: 15.7 G/DL (ref 13.3–17.7)
IMM GRANULOCYTES # BLD: 0 10E3/UL
IMM GRANULOCYTES NFR BLD: 0 %
LYMPHOCYTES # BLD AUTO: 1.5 10E3/UL (ref 0.8–5.3)
LYMPHOCYTES NFR BLD AUTO: 17 %
MCH RBC QN AUTO: 33.2 PG (ref 26.5–33)
MCHC RBC AUTO-ENTMCNC: 34.2 G/DL (ref 31.5–36.5)
MCV RBC AUTO: 97 FL (ref 78–100)
MONOCYTES # BLD AUTO: 0.6 10E3/UL (ref 0–1.3)
MONOCYTES NFR BLD AUTO: 7 %
NEUTROPHILS # BLD AUTO: 6.3 10E3/UL (ref 1.6–8.3)
NEUTROPHILS NFR BLD AUTO: 73 %
NRBC # BLD AUTO: 0 10E3/UL
NRBC BLD AUTO-RTO: 0 /100
OPIATES UR QL SCN: ABNORMAL
OPIATES UR QL SCN: ABNORMAL
PLATELET # BLD AUTO: 334 10E3/UL (ref 150–450)
POTASSIUM BLD-SCNC: 3.5 MMOL/L (ref 3.4–5.3)
PROT SERPL-MCNC: 8 G/DL (ref 6.8–8.8)
RBC # BLD AUTO: 4.73 10E6/UL (ref 4.4–5.9)
SARS-COV-2 RNA RESP QL NAA+PROBE: NEGATIVE
SODIUM SERPL-SCNC: 136 MMOL/L (ref 133–144)
WBC # BLD AUTO: 8.6 10E3/UL (ref 4–11)

## 2022-03-31 PROCEDURE — 99284 EMERGENCY DEPT VISIT MOD MDM: CPT | Performed by: FAMILY MEDICINE

## 2022-03-31 PROCEDURE — 80307 DRUG TEST PRSMV CHEM ANLYZR: CPT | Performed by: FAMILY MEDICINE

## 2022-03-31 PROCEDURE — 99285 EMERGENCY DEPT VISIT HI MDM: CPT | Performed by: FAMILY MEDICINE

## 2022-03-31 PROCEDURE — 80053 COMPREHEN METABOLIC PANEL: CPT | Performed by: FAMILY MEDICINE

## 2022-03-31 PROCEDURE — 250N000013 HC RX MED GY IP 250 OP 250 PS 637: Performed by: FAMILY MEDICINE

## 2022-03-31 PROCEDURE — 250N000013 HC RX MED GY IP 250 OP 250 PS 637: Performed by: NURSE PRACTITIONER

## 2022-03-31 PROCEDURE — 36415 COLL VENOUS BLD VENIPUNCTURE: CPT | Performed by: FAMILY MEDICINE

## 2022-03-31 PROCEDURE — U0003 INFECTIOUS AGENT DETECTION BY NUCLEIC ACID (DNA OR RNA); SEVERE ACUTE RESPIRATORY SYNDROME CORONAVIRUS 2 (SARS-COV-2) (CORONAVIRUS DISEASE [COVID-19]), AMPLIFIED PROBE TECHNIQUE, MAKING USE OF HIGH THROUGHPUT TECHNOLOGIES AS DESCRIBED BY CMS-2020-01-R: HCPCS | Performed by: FAMILY MEDICINE

## 2022-03-31 PROCEDURE — C9803 HOPD COVID-19 SPEC COLLECT: HCPCS | Performed by: FAMILY MEDICINE

## 2022-03-31 PROCEDURE — 82075 ASSAY OF BREATH ETHANOL: CPT | Performed by: FAMILY MEDICINE

## 2022-03-31 PROCEDURE — HZ2ZZZZ DETOXIFICATION SERVICES FOR SUBSTANCE ABUSE TREATMENT: ICD-10-PCS | Performed by: FAMILY MEDICINE

## 2022-03-31 PROCEDURE — 250N000011 HC RX IP 250 OP 636: Performed by: NURSE PRACTITIONER

## 2022-03-31 PROCEDURE — 85014 HEMATOCRIT: CPT | Performed by: FAMILY MEDICINE

## 2022-03-31 PROCEDURE — 128N000004 HC R&B CD ADULT

## 2022-03-31 PROCEDURE — 250N000013 HC RX MED GY IP 250 OP 250 PS 637: Performed by: PSYCHIATRY & NEUROLOGY

## 2022-03-31 PROCEDURE — 128N000001 HC R&B CD/MH ADULT

## 2022-03-31 RX ORDER — NALOXONE HYDROCHLORIDE 0.4 MG/ML
0.2 INJECTION, SOLUTION INTRAMUSCULAR; INTRAVENOUS; SUBCUTANEOUS
Status: DISCONTINUED | OUTPATIENT
Start: 2022-03-31 | End: 2022-04-05 | Stop reason: HOSPADM

## 2022-03-31 RX ORDER — LISINOPRIL 20 MG/1
20 TABLET ORAL DAILY
Status: DISCONTINUED | OUTPATIENT
Start: 2022-04-01 | End: 2022-03-31

## 2022-03-31 RX ORDER — FOLIC ACID 1 MG/1
1 TABLET ORAL DAILY
Status: DISCONTINUED | OUTPATIENT
Start: 2022-03-31 | End: 2022-03-31

## 2022-03-31 RX ORDER — ATENOLOL 50 MG/1
50 TABLET ORAL DAILY PRN
Status: DISCONTINUED | OUTPATIENT
Start: 2022-03-31 | End: 2022-03-31

## 2022-03-31 RX ORDER — DIAZEPAM 5 MG
5-20 TABLET ORAL EVERY 30 MIN PRN
Status: DISCONTINUED | OUTPATIENT
Start: 2022-03-31 | End: 2022-03-31

## 2022-03-31 RX ORDER — MULTIPLE VITAMINS W/ MINERALS TAB 9MG-400MCG
1 TAB ORAL DAILY
Status: DISCONTINUED | OUTPATIENT
Start: 2022-04-01 | End: 2022-04-05 | Stop reason: HOSPADM

## 2022-03-31 RX ORDER — GABAPENTIN 300 MG/1
600 CAPSULE ORAL 3 TIMES DAILY
Status: DISCONTINUED | OUTPATIENT
Start: 2022-03-31 | End: 2022-04-05 | Stop reason: HOSPADM

## 2022-03-31 RX ORDER — NALOXONE HYDROCHLORIDE 0.4 MG/ML
0.4 INJECTION, SOLUTION INTRAMUSCULAR; INTRAVENOUS; SUBCUTANEOUS
Status: DISCONTINUED | OUTPATIENT
Start: 2022-03-31 | End: 2022-04-05 | Stop reason: HOSPADM

## 2022-03-31 RX ORDER — ACETAMINOPHEN 325 MG/1
650 TABLET ORAL EVERY 4 HOURS PRN
Status: DISCONTINUED | OUTPATIENT
Start: 2022-03-31 | End: 2022-04-01

## 2022-03-31 RX ORDER — LOPERAMIDE HCL 2 MG
2 CAPSULE ORAL 4 TIMES DAILY PRN
Status: DISCONTINUED | OUTPATIENT
Start: 2022-03-31 | End: 2022-04-05 | Stop reason: HOSPADM

## 2022-03-31 RX ORDER — MULTIPLE VITAMINS W/ MINERALS TAB 9MG-400MCG
1 TAB ORAL DAILY
Status: DISCONTINUED | OUTPATIENT
Start: 2022-03-31 | End: 2022-03-31

## 2022-03-31 RX ORDER — METHADONE HYDROCHLORIDE 5 MG/5ML
63 SOLUTION ORAL DAILY
Status: DISCONTINUED | OUTPATIENT
Start: 2022-03-31 | End: 2022-04-01

## 2022-03-31 RX ORDER — HYDROXYZINE HYDROCHLORIDE 25 MG/1
25 TABLET, FILM COATED ORAL EVERY 4 HOURS PRN
Status: DISCONTINUED | OUTPATIENT
Start: 2022-03-31 | End: 2022-04-05 | Stop reason: HOSPADM

## 2022-03-31 RX ORDER — MAGNESIUM HYDROXIDE/ALUMINUM HYDROXICE/SIMETHICONE 120; 1200; 1200 MG/30ML; MG/30ML; MG/30ML
30 SUSPENSION ORAL EVERY 4 HOURS PRN
Status: DISCONTINUED | OUTPATIENT
Start: 2022-03-31 | End: 2022-04-05 | Stop reason: HOSPADM

## 2022-03-31 RX ORDER — FOLIC ACID 1 MG/1
1 TABLET ORAL DAILY
Status: DISCONTINUED | OUTPATIENT
Start: 2022-04-01 | End: 2022-04-05 | Stop reason: HOSPADM

## 2022-03-31 RX ORDER — TRAZODONE HYDROCHLORIDE 50 MG/1
50 TABLET, FILM COATED ORAL
Status: DISCONTINUED | OUTPATIENT
Start: 2022-03-31 | End: 2022-04-05 | Stop reason: HOSPADM

## 2022-03-31 RX ORDER — NICOTINE 21 MG/24HR
1 PATCH, TRANSDERMAL 24 HOURS TRANSDERMAL DAILY
Status: DISCONTINUED | OUTPATIENT
Start: 2022-03-31 | End: 2022-04-05 | Stop reason: HOSPADM

## 2022-03-31 RX ORDER — ATENOLOL 50 MG/1
50 TABLET ORAL DAILY PRN
Status: DISCONTINUED | OUTPATIENT
Start: 2022-03-31 | End: 2022-04-05 | Stop reason: HOSPADM

## 2022-03-31 RX ORDER — ONDANSETRON 4 MG/1
4 TABLET, ORALLY DISINTEGRATING ORAL EVERY 6 HOURS PRN
Status: DISCONTINUED | OUTPATIENT
Start: 2022-03-31 | End: 2022-04-05 | Stop reason: HOSPADM

## 2022-03-31 RX ORDER — LISINOPRIL 20 MG/1
20 TABLET ORAL DAILY
Status: DISCONTINUED | OUTPATIENT
Start: 2022-03-31 | End: 2022-04-05 | Stop reason: HOSPADM

## 2022-03-31 RX ORDER — DIAZEPAM 5 MG
5-20 TABLET ORAL EVERY 30 MIN PRN
Status: DISCONTINUED | OUTPATIENT
Start: 2022-03-31 | End: 2022-04-05 | Stop reason: HOSPADM

## 2022-03-31 RX ORDER — AMOXICILLIN 250 MG
1 CAPSULE ORAL 2 TIMES DAILY PRN
Status: DISCONTINUED | OUTPATIENT
Start: 2022-03-31 | End: 2022-04-05 | Stop reason: HOSPADM

## 2022-03-31 RX ADMIN — ONDANSETRON 4 MG: 4 TABLET, ORALLY DISINTEGRATING ORAL at 19:20

## 2022-03-31 RX ADMIN — GABAPENTIN 600 MG: 300 CAPSULE ORAL at 19:32

## 2022-03-31 RX ADMIN — FOLIC ACID 1 MG: 1 TABLET ORAL at 13:42

## 2022-03-31 RX ADMIN — THIAMINE HCL TAB 100 MG 100 MG: 100 TAB at 13:42

## 2022-03-31 RX ADMIN — NICOTINE 1 PATCH: 21 PATCH, EXTENDED RELEASE TRANSDERMAL at 18:28

## 2022-03-31 RX ADMIN — Medication 12.5 MG: at 21:09

## 2022-03-31 RX ADMIN — DIAZEPAM 10 MG: 5 TABLET ORAL at 18:19

## 2022-03-31 RX ADMIN — DIAZEPAM 10 MG: 5 TABLET ORAL at 19:32

## 2022-03-31 RX ADMIN — HYDROXYZINE HYDROCHLORIDE 25 MG: 25 TABLET, FILM COATED ORAL at 22:27

## 2022-03-31 RX ADMIN — TRAZODONE HYDROCHLORIDE 50 MG: 50 TABLET ORAL at 22:27

## 2022-03-31 RX ADMIN — METHADONE HYDROCHLORIDE 63 MG: 5 SOLUTION ORAL at 18:37

## 2022-03-31 RX ADMIN — LISINOPRIL 20 MG: 20 TABLET ORAL at 19:38

## 2022-03-31 RX ADMIN — MULTIPLE VITAMINS W/ MINERALS TAB 1 TABLET: TAB at 13:42

## 2022-03-31 NOTE — PHARMACY
Outpatient Methadone Dosing Information    Clinic: [facility]  Location: [place]   Phone: [phone], spoke with Tawana HALL signed on 3/12/2022 during previous admission     Dose: Methadone 125 mg oral solution by mouth daily   Last dose: 3/22/2022 at clinic.   Per their clinic's protocol, since patient has missed 8 days of methadone dosing with them, they would decrease the dose to 50% of previous dose, so 63mg PO daily    Does patient receive take-home doses?: No     Katheryn Waterman, Pharm.D., BCPP  Behavioral Health Inpatient Pharmacist  [facility] ([facility]) Emergency Department  Phone: *[phone] ([employer]) or [phone]      Addendum 4/1/22 at 0855    Pt reporting this morning that he did not miss 8 days of methadone. The patient's nurse Efren and I both called [facility] in [place] and spoke with Kathie who stated the on-call nurse had misspoken yesterday and patient last received methadone 125 mg on 3/30/22 with no take-home doses. Will update nurse and provider.    Lupis Blanco, AnushaD, BCPS
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
## 2022-03-31 NOTE — ED NOTES
Pt needs to go to detox before he can start treatment at [facility]. Pt plans on doing to the out pt program   No

## 2022-03-31 NOTE — PHARMACY-ADMISSION MEDICATION HISTORY
Admission Medication History Completed by Pharmacy    See [employer] Admission Navigator for allergy information, preferred outpatient pharmacy and prior to admission medications.     Medication History Sources:     Prescription fill history via Epic Surescripts data    Recent hospital notes     [facility] in [place] for methadone dosing    Additional Information:    Please see writer's other progress note for information regarding PTA methadone dosing.   All medication directions consistent with fill records and all medications refilled within the past month and/or upon hospital discharge on 3/17/2022.   Pharmacist did not interview patient. Last doses in table below were marked by admission RN (besides last methadone dose which was confirmed with [facility] clinic)    Any over the counter products, vitamins or supplements may not be accurately reflected in the PTA medication list.    Prior to Admission medications    Medication Sig Last Dose     FLUoxetine (PROZAC) 20 MG capsule Take 3 capsules (60 mg) by mouth daily     3/30/2022      folic acid (FOLVITE) 1 MG tablet Take 1 tablet (1 mg) by mouth daily     Unknown      gabapentin (NEURONTIN) 300 MG capsule Take 2 capsules (600 mg) by mouth 3 times daily     Unknown      lisinopril (ZESTRIL) 10 MG tablet Take 2 tablets (20 mg) by mouth daily     3/30/2022      methadone (DOLPHINE) 5 MG/5ML solution     Take 125 mg by mouth daily  3/22/2022     multivitamin, therapeutic (THERA-VIT) TABS tablet     Take 1 tablet by mouth daily 3/30/2022      nicotine (NICODERM CQ) 21 MG/24HR 24 hr patch     Place 1 patch onto the skin daily Unknown      thiamine (B-1) 100 MG tablet Take 1 tablet (100 mg) by mouth daily     Unknown        Date completed: 03/31/22    Medication history completed by:   Katheryn Waterman, Pharm.D., [facility]  Behavioral Health Inpatient Pharmacist  [facility] ([facility]) Emergency Department  Phone: *[phone] ([employer]) or [phone]

## 2022-03-31 NOTE — PROGRESS NOTES
03/31/22 1646   Patient Belongings   Did you bring any home meds/supplements to the hospital?  Yes   Disposition of meds  Other (see comment)   Patient Belongings other (see comments)   Belongings Search Yes   Clothing Search Yes   Second Staff Arsenio RN and Alejandro RN   Comment Medication given to nurse, items searched and placed in storage bin   MED BIN:   Ring, wallet, cell-phone, keys, headphones, and lighters x3.  SECURITY: #[id number]  [employer] $43  [employer].... 2428  [place]...2298  [facility] [employer]... 1342  [place] EBT... [phone]  [employer].... [phone]    BIN:   Sweat-pants, 250 cigarette filter tubes, plastic bags of cigarette filter tubes, cigarette machine, bag of nicotine, donuts, apple pie, bag of paperwork, shoes, cords, condoms, mint- altoids containers (x4), bag of change and hair cream.  Black duffle bag labeled un-searched in vitals room-for treatment     A               Admission:  I am responsible for any personal items that are not sent to the safe or pharmacy.  [facility] is not responsible for loss, theft or damage of any property in my possession.    Signature:  _________________________________ Date: _______  Time: _____                                              Staff Signature:  ____________________________ Date: ________  Time: _____      2nd Staff person, if patient is unable/unwilling to sign:    Signature: ________________________________ Date: ________  Time: _____     Discharge:  [facility] has returned all of my personal belongings:    Signature: _________________________________ Date: ________  Time: _____                                          Staff Signature:  ____________________________ Date: ________  Time: _____

## 2022-03-31 NOTE — TELEPHONE ENCOUNTER
S:  Pt is a 36 yr old male in the [place] ED requesting detox from alcohol.    B:  Info per  :  Pt reports he resumed drinking after discharge from 8A and is drinking 1 Ltr of whiskey daily.  Breathalyzer is .171.  utox positive for Benzos, amphetamines, pot  and cocaine also.  He report he used cocaine and meth, which he says he doesn't typically use.  LFTs elevated.   Pt denies seizues but says he has a hx of DT's.   He says typically he gets the shakes, sweats and naseua. He came in ambulating independently.    He has a hx of depression and anxiety but denies any acute sx at this time.   No ongoing medical issues.  Medically cleared.  Please see chart for further info.    Patient cleared and ready for behavioral bed placement: Yes     A:  Needing hospitalization for safe detox    R:  Admit to 3A   CD    accepts for herself     Vol      -  3:38  3A , Arsenio, informed  -  3:38  ED informed

## 2022-04-01 ENCOUNTER — TELEPHONE (OUTPATIENT)
Dept: BEHAVIORAL HEALTH | Facility: CLINIC | Age: 37
End: 2022-04-01
Payer: COMMERCIAL

## 2022-04-01 LAB
ALBUMIN SERPL-MCNC: 3.4 G/DL (ref 3.4–5)
ALP SERPL-CCNC: 76 U/L (ref 40–150)
ALT SERPL W P-5'-P-CCNC: 117 U/L (ref 0–70)
ANION GAP SERPL CALCULATED.3IONS-SCNC: 7 MMOL/L (ref 3–14)
AST SERPL W P-5'-P-CCNC: 88 U/L (ref 0–45)
BASOPHILS # BLD AUTO: 0.1 10E3/UL (ref 0–0.2)
BASOPHILS NFR BLD AUTO: 2 %
BILIRUB SERPL-MCNC: 1.1 MG/DL (ref 0.2–1.3)
BUN SERPL-MCNC: 13 MG/DL (ref 7–30)
CALCIUM SERPL-MCNC: 9.4 MG/DL (ref 8.5–10.1)
CHLORIDE BLD-SCNC: 98 MMOL/L (ref 94–109)
CHOLEST SERPL-MCNC: 200 MG/DL
CO2 SERPL-SCNC: 33 MMOL/L (ref 20–32)
CREAT SERPL-MCNC: 0.66 MG/DL (ref 0.66–1.25)
EOSINOPHIL # BLD AUTO: 0.3 10E3/UL (ref 0–0.7)
EOSINOPHIL NFR BLD AUTO: 5 %
ERYTHROCYTE [DISTWIDTH] IN BLOOD BY AUTOMATED COUNT: 13.2 % (ref 10–15)
GFR SERPL CREATININE-BSD FRML MDRD: >90 ML/MIN/1.73M2
GLUCOSE BLD-MCNC: 74 MG/DL (ref 70–99)
HCT VFR BLD AUTO: 45.3 % (ref 40–53)
HDLC SERPL-MCNC: 86 MG/DL
HGB BLD-MCNC: 15.5 G/DL (ref 13.3–17.7)
IMM GRANULOCYTES # BLD: 0 10E3/UL
IMM GRANULOCYTES NFR BLD: 1 %
LDLC SERPL CALC-MCNC: 104 MG/DL
LYMPHOCYTES # BLD AUTO: 1.5 10E3/UL (ref 0.8–5.3)
LYMPHOCYTES NFR BLD AUTO: 26 %
MCH RBC QN AUTO: 33.2 PG (ref 26.5–33)
MCHC RBC AUTO-ENTMCNC: 34.2 G/DL (ref 31.5–36.5)
MCV RBC AUTO: 97 FL (ref 78–100)
MONOCYTES # BLD AUTO: 0.6 10E3/UL (ref 0–1.3)
MONOCYTES NFR BLD AUTO: 11 %
NEUTROPHILS # BLD AUTO: 3.1 10E3/UL (ref 1.6–8.3)
NEUTROPHILS NFR BLD AUTO: 55 %
NONHDLC SERPL-MCNC: 114 MG/DL
NRBC # BLD AUTO: 0 10E3/UL
NRBC BLD AUTO-RTO: 0 /100
PLATELET # BLD AUTO: 316 10E3/UL (ref 150–450)
POTASSIUM BLD-SCNC: 3.2 MMOL/L (ref 3.4–5.3)
PROT SERPL-MCNC: 7 G/DL (ref 6.8–8.8)
RBC # BLD AUTO: 4.67 10E6/UL (ref 4.4–5.9)
SODIUM SERPL-SCNC: 138 MMOL/L (ref 133–144)
TRIGL SERPL-MCNC: 48 MG/DL
TSH SERPL DL<=0.005 MIU/L-ACNC: 3.15 MU/L (ref 0.4–4)
WBC # BLD AUTO: 5.6 10E3/UL (ref 4–11)

## 2022-04-01 PROCEDURE — 128N000001 HC R&B CD/MH ADULT

## 2022-04-01 PROCEDURE — 36415 COLL VENOUS BLD VENIPUNCTURE: CPT | Performed by: PSYCHIATRY & NEUROLOGY

## 2022-04-01 PROCEDURE — 80053 COMPREHEN METABOLIC PANEL: CPT | Performed by: PSYCHIATRY & NEUROLOGY

## 2022-04-01 PROCEDURE — 80061 LIPID PANEL: CPT | Performed by: PSYCHIATRY & NEUROLOGY

## 2022-04-01 PROCEDURE — 84443 ASSAY THYROID STIM HORMONE: CPT | Performed by: PSYCHIATRY & NEUROLOGY

## 2022-04-01 PROCEDURE — 250N000013 HC RX MED GY IP 250 OP 250 PS 637: Performed by: PSYCHIATRY & NEUROLOGY

## 2022-04-01 PROCEDURE — 128N000004 HC R&B CD ADULT

## 2022-04-01 PROCEDURE — 250N000011 HC RX IP 250 OP 636: Performed by: NURSE PRACTITIONER

## 2022-04-01 PROCEDURE — 250N000013 HC RX MED GY IP 250 OP 250 PS 637: Performed by: PHYSICIAN ASSISTANT

## 2022-04-01 PROCEDURE — 99223 1ST HOSP IP/OBS HIGH 75: CPT | Mod: AI | Performed by: PSYCHIATRY & NEUROLOGY

## 2022-04-01 PROCEDURE — 85025 COMPLETE CBC W/AUTO DIFF WBC: CPT | Performed by: PSYCHIATRY & NEUROLOGY

## 2022-04-01 RX ORDER — POTASSIUM CHLORIDE 1.5 G/1.58G
40 POWDER, FOR SOLUTION ORAL ONCE
Status: COMPLETED | OUTPATIENT
Start: 2022-04-01 | End: 2022-04-01

## 2022-04-01 RX ORDER — METHADONE HYDROCHLORIDE 5 MG/5ML
63 SOLUTION ORAL DAILY
Status: DISCONTINUED | OUTPATIENT
Start: 2022-04-01 | End: 2022-04-04

## 2022-04-01 RX ORDER — METHADONE HYDROCHLORIDE 5 MG/5ML
62 SOLUTION ORAL EVERY MORNING
Status: DISCONTINUED | OUTPATIENT
Start: 2022-04-01 | End: 2022-04-04

## 2022-04-01 RX ADMIN — ONDANSETRON 4 MG: 4 TABLET, ORALLY DISINTEGRATING ORAL at 17:51

## 2022-04-01 RX ADMIN — FOLIC ACID 1 MG: 1 TABLET ORAL at 08:46

## 2022-04-01 RX ADMIN — DIAZEPAM 10 MG: 5 TABLET ORAL at 00:51

## 2022-04-01 RX ADMIN — DIAZEPAM 10 MG: 5 TABLET ORAL at 16:38

## 2022-04-01 RX ADMIN — METHADONE HYDROCHLORIDE 62 MG: 5 SOLUTION ORAL at 11:11

## 2022-04-01 RX ADMIN — GABAPENTIN 600 MG: 300 CAPSULE ORAL at 08:46

## 2022-04-01 RX ADMIN — NICOTINE POLACRILEX 2 MG: 2 GUM, CHEWING BUCCAL at 20:23

## 2022-04-01 RX ADMIN — GABAPENTIN 600 MG: 300 CAPSULE ORAL at 20:22

## 2022-04-01 RX ADMIN — MULTIPLE VITAMINS W/ MINERALS TAB 1 TABLET: TAB at 08:46

## 2022-04-01 RX ADMIN — TRAZODONE HYDROCHLORIDE 50 MG: 50 TABLET ORAL at 21:57

## 2022-04-01 RX ADMIN — DIAZEPAM 10 MG: 5 TABLET ORAL at 18:01

## 2022-04-01 RX ADMIN — THIAMINE HCL TAB 100 MG 100 MG: 100 TAB at 08:46

## 2022-04-01 RX ADMIN — HYDROXYZINE HYDROCHLORIDE 25 MG: 25 TABLET, FILM COATED ORAL at 05:51

## 2022-04-01 RX ADMIN — LISINOPRIL 20 MG: 20 TABLET ORAL at 08:46

## 2022-04-01 RX ADMIN — NICOTINE 1 PATCH: 21 PATCH, EXTENDED RELEASE TRANSDERMAL at 08:44

## 2022-04-01 RX ADMIN — METHADONE HYDROCHLORIDE 63 MG: 5 SOLUTION ORAL at 16:33

## 2022-04-01 RX ADMIN — DIAZEPAM 10 MG: 5 TABLET ORAL at 08:44

## 2022-04-01 RX ADMIN — DIAZEPAM 10 MG: 5 TABLET ORAL at 20:22

## 2022-04-01 RX ADMIN — FLUOXETINE 60 MG: 20 CAPSULE ORAL at 08:46

## 2022-04-01 RX ADMIN — HYDROXYZINE HYDROCHLORIDE 25 MG: 25 TABLET, FILM COATED ORAL at 17:52

## 2022-04-01 RX ADMIN — POTASSIUM CHLORIDE 40 MEQ: 1.5 FOR SOLUTION ORAL at 11:11

## 2022-04-01 RX ADMIN — ONDANSETRON 4 MG: 4 TABLET, ORALLY DISINTEGRATING ORAL at 08:44

## 2022-04-01 RX ADMIN — DIAZEPAM 10 MG: 5 TABLET ORAL at 12:18

## 2022-04-01 RX ADMIN — GABAPENTIN 600 MG: 300 CAPSULE ORAL at 13:05

## 2022-04-01 ASSESSMENT — ACTIVITIES OF DAILY LIVING (ADL)
DRESS: INDEPENDENT
HYGIENE/GROOMING: INDEPENDENT
ORAL_HYGIENE: INDEPENDENT
LAUNDRY: WITH SUPERVISION
LAUNDRY: WITH SUPERVISION
DRESS: STREET CLOTHES
HYGIENE/GROOMING: INDEPENDENT
ORAL_HYGIENE: INDEPENDENT

## 2022-04-01 NOTE — PROGRESS NOTES
Pt states he hasn't missed a week at his clinic as previously noted. Called [facility] and spoke to FRANCO Vázquez who states he was dosed there on 3/30/22 @ 0924 for 125mg methadone. Updated our pharmacist Lupis who is going to confirm with [facility] the correct dosing.

## 2022-04-01 NOTE — PLAN OF CARE
Goal Outcome Evaluation:    Plan of Care Reviewed With: patient     Overall Patient Progress: improving    Outcome Evaluation: Pt here for alcohol withdrawal and appears to be in mild withdrawal at this time.      MSSA scores today are 9 and 8, 10 mg po valium administered x 2. Total of 50 mg valium administered since arrival to unit 3a. He is mild with tremors and sweats and appetite is fair to good with meals. Administered 62 mg methadone x 1 (has a 63 mg dose scheduled for later today). Total methadone for today will = 125 mg.     Pt endorses anxiety and depression both rated 10 of 10 in severity. Denies suicidal plan or intent. Does endorse some suicidal ideation today but does not go into specifics. Mood appears flat and sad. Endorses feeling irritable and tense. States willingness to alert staff if he develops any plan of harming self or of suicide (contracts for safety). Pt is on suicide precautions.     Will continue to monitor and intervene as warranted.

## 2022-04-01 NOTE — PLAN OF CARE
Behavioral Team Discussion: (4/1/2022)    Continued Stay Criteria/Rationale: Patient admitted for alcohol withdrawal and Use Disorder.  Plan: The following services will be provided to the patient; psychiatric assessment, medication management, therapeutic milieu, individual and group support, and skills groups.   Participants: 3A Provider: Dr. Toby Ayala MD; 3A RN: Efren Sabillon, RN; 3A CM's: Leslye Reyna .  Summary/Recommendation: Providers will assess today for treatment recommendations, discharge planning, and aftercare plans. CM will meet with pt for discharge planning. Pt is on Methadone maintenance.  Medical/Physical: Diverticulitis, history of colon resection surgery from diverticulitis in 2012.  On last admission Pt was transferred to medical due to elevated ALT and AST.  Precautions:   Behavioral Orders   Procedures     Code 1 - Restrict to Unit     Routine Programming     As clinically indicated     Status 15     Every 15 minutes.     Withdrawal precautions     Rationale for change in precautions or plan: N/A  Progress: Initial.    PT SEEN   AGREE WITH ASSESSMENT AND PLAN

## 2022-04-01 NOTE — PROGRESS NOTES
Chirag slept through the night. Scored 9 & 5 on MSSA. Got 10mg valium. No safety or behavioral concerns. Will continue to monitor.

## 2022-04-01 NOTE — PROGRESS NOTES
"Met with Pt for discharge planning.  Pt reports he was going to [facility] 3R's however review of chart indicates he has not been attending.  Pt reports maybe being interested in sober housing. Pt informed that sober housing requires 30 days of sobriety and recommendation is for Pt to step up to residential treatment as he has been unsuccessful in maintaining sobriety in [facility].  Pt states, \"I don't know.  I can't think about this right now.\"  Pt is squirming and unable to stand still.  Pt at window getting medications from RN.  Will speak with Pt later in the day.  "

## 2022-04-01 NOTE — TELEPHONE ENCOUNTER
12:29pm Intake received call frm Dr. Ayala reporting pt has increasing depression and anxiety and will need to be admitted for MICD from admission.

## 2022-04-01 NOTE — PROGRESS NOTES
"Pt is a 37 yo male admitted this evening to 3A from [facility] ED for alcohol withdrawal. Pt has a hx of depression, anxiety and polysubstance abuse. Pt was recently discharged from station 8A where he was transferred from 3A to manage his complication of withdrawal. Pt reports he resumed drinking after discharge on the 13th of this month and has been drinking a liter of whiskey daily. Last drink today before coming to the hospital.reports he was asked to go to [facility] for outpt treatment, but he started to drink again. Pt endorses using marijuana daily as a medication. He is on methadone maintenance for opiate use. denies history of withdrawal seizure. Pt denies SI, hallucinations or depression. States \" going to bed and not waking up would be the easiest way to go, but I'm not going to kill myself\"    On admission, pt was shaky, sweaty and teary. Also had a stomach ache. States he has diverticulitis and s/p surgery years ago. Vomited after dinner, prn zofran given. MSSA 10 and 10, received 20mg valium total this shift.     1800: /11, PTA lisinopril 20mg  Given.   2100: Recheck 165/92 prn hydralazine 12.5 mg given   2220: /92,  Anxious, can't sleep, prn trazodone 50mg and hydroxyzine 25 mg given.  "

## 2022-04-01 NOTE — PROGRESS NOTES
[place] Prescription Monitoring Program  Hector Shahram, 36M  Refine Search  Contact the [employer]  YOB: 1985  Recent Address:  [address] [place] [address]  View Linked Records (1)  Other Tools/Metrics  [facility]  Report generated on 04/01/2022. Report Date Range: 04/01/2021 - 04/01/2022  PDF Report  [place]  Narx Scores  Narcotic  000  Sedative  000  Stimulant  000  Explanation and Guidance  Overdose Risk Score  000  (Range 000-999)  Explanation and Guidance  State Indicators (0)  Details  RX Graph   Narcotic   Buprenorphine   Sedative   Stimulant   Other  Learn how to use graph  Prescribers  1 - Johannamarga Barker  Timeline  04/01  2m  6m  1y  2y  Disclaimer  Morphine Milligram Equivalent Prescribed Over Time  Last 30 Days  Last 60 Days  Last 90 Days  Last 1 Year  Last 2 Years  MME  Timeframe  0  1  2  3/3/22  3/18/22  4/1/22  0  MME per Day Avg.  0  MME per RX  Disclaimer  Lorazepam MgEq (LME) Prescribed Over Time  Last 30 Days  Last 60 Days  Last 90 Days  Last 1 Year  Last 2 Years  LME  Timeframe  0  1  2  3/3/22  3/18/22  4/1/22  0  LME Per Day Avg.  0  LME mg Per Rx  Disclaimer  Buprenorphine (mg) Prescribed Over Time  Last 30 Days  Last 60 Days  Last 90 Days  Last 1 Year  Last 2 Years  mg  Timeframe  0  1  2  3/3/22  3/18/22  4/1/22  0  mg Per Day Avg.  0  Avg mg Per Rx  Disclaimer  RX Summary  Summary  Total Prescriptions 1  Total Private Pay 0  Total Prescribers 1  Total Pharmacies 1  Opioids* (excluding Buprenorphine)  Current Qty 0  Current MME/day 0.00  30 Day Avg MME/day 0.00  Buprenorphine*  Current Qty 0  Current mg/day 0.00  30 Day Avg mg/day 0.00  RX Summary Expanded  Narcotics (excluding Buprenorphine)  30 Day Avg. MME 0.00  90 Day Avg. MME 0.00  Rx Count/12 Months 0  Prescriber #/6 Months 0  Pharmacy #/6 Months 0  Current Quantity 0  Buprenorphine  30 Day Avg. mg/day 0.00  90 Day Avg. mg/day 0.00  Rx Count/12 Months 0  Prescriber #/6  Months 0  Pharmacy #/6 Months 0  Current Quantity 0  Sedatives  30 Day Avg. LME 0.00  90 Day Avg. LME 0.00  Rx Count/12 Months 0  Prescriber #/6 Months 0  Pharmacy #/6 Months 0  Current Quantity 0  Stimulants  30 Day Avg. mg/day 0.00  90 Day Avg. mg/day 0.00  Rx Count/12 Months 0  Prescriber #/6 Months 0  Pharmacy #/6 Months 0  Current Quantity 0  Prescriptions  Total: 1  Private Pay: 0  Showing 1 Item View 15 Items  1 of 1   Filled  Written  Sold  ID  Drug  QTY  Days  Prescriber  RX #  Dispenser  Refill  Daily Dose*  Pymt Type     03/17/2022 03/17/2022 03/18/2022 1   Gabapentin 300 Mg Capsule  120.00 20 Kr Jm [id number] Tom (8969) 0/0  Medicaid [place]  Disclaimer  Showing 1 Item View 15 Items  1 of 1   Providers  Total: 1  Showing 1 Item View 15 Items  1 of 1   Name  Address  City  State  Zipcode  Phone   Johanna Barker [address] [facility] [address] [phone]  Showing 1 Item View 15 Items  1 of 1   Pharmacies  Total: 1  Showing 1 Item View 15 Items  1 of 1   Name  Address  City  State  Zipcode  Phone   [facility] [phone] [facility] [address] [phone]  Showing 1 Item View 15 Items  1 of 1   The report provided is based upon the search criteria entered and the corresponding data as it has been reported by dispenser(s). If erroneous information is identified or additional information is needed, please contact the dispenser or the prescriber provided on the report. Date Sold signifies the date the prescription was sold (left the pharmacy). The absence of Date Sold does not necessarily indicate the prescription was not dispensed. Fill Date represents the date the medication was filled or prepared by the pharmacy. Note, federal regulation (CFR Title 42: Part 2) requires patient consent prior to releasing certain patient data from federally funded opioid treatment programs (OTPs). As such, controlled substances dispensed from OTPs for medication-assisted treatment may not  appear in the [place]  report. Morphine milligram equivalent (MME) conversion factors published by the CDC are used in the MME calculation. Per the CDC, the MME conversion factor is intended only for analytic purposes where prescription data are used to retrospectively calculate daily MME to inform analyses of risks associated with opioid prescribing. This value does not constitute clinical guidance or recommendations for converting patients from one form of opioid analgesic to another. Per the CDC, the conversion factors for drugs prescribed or provided, as part of medication-assisted treatment for opioid use disorder should not be used to benchmark against MME dosage thresholds meant for opioids prescribed for pain. Buprenorphine products listed in the CDC s MME file do not have an associated conversion factor. Lastly, the CDC notes, in clinical practice, calculating MME for methadone often involves a sliding-scale approach, whereby the conversion factor increases with increasing dose. The conversion factor of 3 for methadone presented in this file could underestimate MME for a given patient. This report contains confidential information, including patient identifiers, and is not a public record. The information on this report must be treated as protected health information and is only to be disclosed to others as authorized by applicable state and Federal regulations.

## 2022-04-01 NOTE — H&P
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 Kidney Disease Epidemiology Collaboration   (CKD-EPI) equation.       Calcium   Date Value Ref Range Status   04/01/2022 9.4 8.5 - 10.1 mg/dL Final   03/23/2021 9.0 8.5 - 10.1 mg/dL Final     Bilirubin Total   Date Value Ref Range Status   04/01/2022 1.1 0.2 - 1.3 mg/dL Final   03/23/2021 0.3 0.2 - 1.3 mg/dL Final     Alkaline Phosphatase   Date Value Ref Range Status   04/01/2022 76 40 - 150 U/L Final   03/23/2021 69 40 - 150 U/L Final     ALT   Date Value Ref Range Status   04/01/2022 117 (H) 0 - 70 U/L Final   03/23/2021 63 0 - 70 U/L Final     AST   Date Value Ref Range Status   04/01/2022 88 (H) 0 - 45 U/L Final   03/23/2021 45 0 - 45 U/L Final                   Medical treatment/interventions:  Medical concerns: As above    - Consults: IM consult placed. Appreciate assistance.     Legal Status: Voluntary     Safety Assessment:   Checks: Status 15  Pt has not required locked seclusion or restraints in the past 24 hours to maintain safety, please refer to RN documentation for further details.    The risks, benefits, alternatives and side effects have been discussed and are understood by the patient.       Patient will be treated in therapeutic milieu with appropriate individual and group therapies as described.  Disposition: Pending  "clinical stabilization. Pt does  appear interested in COMPLETE DETOX AND DO TRT  Length of stay 3-5 days        \"Much or all of the text in this note was generated through the use of Dragon Dictate voice to text software. Errors in spelling or words which appear to be out of contact are unintentional, may be present due having escaped editing\"       Patient is MICD from time of admission  "

## 2022-04-01 NOTE — PROGRESS NOTES
Met with Pt for further discharge planning.  Pt is now agreeable to going door to door to residential treatment.  Pt prefers referral to [facility].  Coached Pt to complete paperwork for assessment and referral.  Pt acknowledged.  Pt has ELLIOT JUNE.

## 2022-04-01 NOTE — CONSULTS
Circumstances of recent discharge and readmission noted. Please refer to recent Internal Medicine H&P in charting dated 3/13/22 which was reviewed.     In brief, Shahram Young is a 36 year old male with a history of alcohol use disorder, opiate use disorder, HTN, diverticulitis s/p partial colectomy, depression, and anxiety who is admitted to  for alcohol detox. Of note, he required transfer to hospital floor during last detox admission due to increasing paranoia, delusions, and agitations related to severe alcohol withdrawal.    Diagnoses:  #Alcohol Withdrawal: Drinking 1 L daily. No hx of withdrawal seizures or DTs. Management per Psychiatry.  #Transaminitis: ALT &  on admit, improved to 117 & 88 on 4/1. Downtrending from admission in 3/2022. US (3/16/22) with hepatic steatosis. Repeat CMP per PCP.  #Hypokalemia: K 3.2 on 4/1/22. Give 40 mEq KCl x 1. Repeat K in AM.  #HTN: BP elevated on 3/31 in setting of withdrawal, now improved. Continue PTA Lisinopril 20 mg daily. Notify Medicine if BP >180/110 during withdrawal.  #Opiate Use Disorder: On Methadone maintenance. Management per Psychiatry.  #Depression, Anxiety: On Prozac and Gabapentin PTA. Management per Psychiatry.    Case d/w RN and Psychiatrist, no concerns for DTs or confusion at this time. Medicine will follow up on repeat K on 4/2.     Please page the on-call provider for any follow up medical concerns if they arise including new onset confusion or hallucinations.     Agustina Lawson PA-C  Hospitalist Service  [phone]

## 2022-04-02 LAB
HOLD SPECIMEN: NORMAL
POTASSIUM BLD-SCNC: 3.7 MMOL/L (ref 3.4–5.3)

## 2022-04-02 PROCEDURE — 250N000011 HC RX IP 250 OP 636: Performed by: NURSE PRACTITIONER

## 2022-04-02 PROCEDURE — 250N000013 HC RX MED GY IP 250 OP 250 PS 637: Performed by: PHYSICIAN ASSISTANT

## 2022-04-02 PROCEDURE — 84132 ASSAY OF SERUM POTASSIUM: CPT | Performed by: PHYSICIAN ASSISTANT

## 2022-04-02 PROCEDURE — 36415 COLL VENOUS BLD VENIPUNCTURE: CPT | Performed by: PHYSICIAN ASSISTANT

## 2022-04-02 PROCEDURE — 250N000013 HC RX MED GY IP 250 OP 250 PS 637: Performed by: PSYCHIATRY & NEUROLOGY

## 2022-04-02 PROCEDURE — 128N000004 HC R&B CD ADULT

## 2022-04-02 PROCEDURE — 128N000001 HC R&B CD/MH ADULT

## 2022-04-02 RX ADMIN — NICOTINE 1 PATCH: 21 PATCH, EXTENDED RELEASE TRANSDERMAL at 09:05

## 2022-04-02 RX ADMIN — DIAZEPAM 10 MG: 5 TABLET ORAL at 16:23

## 2022-04-02 RX ADMIN — HYDROXYZINE HYDROCHLORIDE 25 MG: 25 TABLET, FILM COATED ORAL at 08:48

## 2022-04-02 RX ADMIN — METHADONE HYDROCHLORIDE 63 MG: 5 SOLUTION ORAL at 14:18

## 2022-04-02 RX ADMIN — GABAPENTIN 600 MG: 300 CAPSULE ORAL at 14:17

## 2022-04-02 RX ADMIN — FOLIC ACID 1 MG: 1 TABLET ORAL at 08:43

## 2022-04-02 RX ADMIN — METHADONE HYDROCHLORIDE 62 MG: 5 SOLUTION ORAL at 09:10

## 2022-04-02 RX ADMIN — DIAZEPAM 10 MG: 5 TABLET ORAL at 12:19

## 2022-04-02 RX ADMIN — DIAZEPAM 10 MG: 5 TABLET ORAL at 00:37

## 2022-04-02 RX ADMIN — DIAZEPAM 10 MG: 5 TABLET ORAL at 08:48

## 2022-04-02 RX ADMIN — HYDROXYZINE HYDROCHLORIDE 25 MG: 25 TABLET, FILM COATED ORAL at 12:18

## 2022-04-02 RX ADMIN — MULTIPLE VITAMINS W/ MINERALS TAB 1 TABLET: TAB at 08:43

## 2022-04-02 RX ADMIN — ONDANSETRON 4 MG: 4 TABLET, ORALLY DISINTEGRATING ORAL at 09:03

## 2022-04-02 RX ADMIN — NICOTINE POLACRILEX 2 MG: 2 GUM, CHEWING BUCCAL at 12:35

## 2022-04-02 RX ADMIN — GABAPENTIN 600 MG: 300 CAPSULE ORAL at 08:43

## 2022-04-02 RX ADMIN — LISINOPRIL 20 MG: 20 TABLET ORAL at 08:43

## 2022-04-02 RX ADMIN — GABAPENTIN 600 MG: 300 CAPSULE ORAL at 20:29

## 2022-04-02 RX ADMIN — FLUOXETINE 60 MG: 20 CAPSULE ORAL at 08:43

## 2022-04-02 RX ADMIN — THIAMINE HCL TAB 100 MG 100 MG: 100 TAB at 08:43

## 2022-04-02 RX ADMIN — NICOTINE POLACRILEX 2 MG: 2 GUM, CHEWING BUCCAL at 20:57

## 2022-04-02 ASSESSMENT — ACTIVITIES OF DAILY LIVING (ADL)
ORAL_HYGIENE: INDEPENDENT
HYGIENE/GROOMING: INDEPENDENT
ORAL_HYGIENE: INDEPENDENT
DRESS: INDEPENDENT
LAUNDRY: WITH SUPERVISION
HYGIENE/GROOMING: INDEPENDENT
LAUNDRY: WITH SUPERVISION
DRESS: INDEPENDENT

## 2022-04-02 NOTE — PROGRESS NOTES
Pt.scored 9 & 4 on MSSA overnight checks. Got 10mg valium. No safety or behavioral concerns. Will continue to monitor.

## 2022-04-02 NOTE — PROGRESS NOTES
Brief Medicine Note    Medicine following for hypokalemia. K 3.2 on 4/1. Received 40 mEq KCl x 1. Repeat K wnl today. No need to further trend unless clinical changes.    Medicine will sign off.    Agustina Lawson PA-C  Hospitalist Service

## 2022-04-02 NOTE — PLAN OF CARE
Goal Outcome Evaluation:    Plan of Care Reviewed With: patient      Pt was visible in the milieu social with peers. Pt edrosed anxiety and depression 10/10. Pt denies SI/HI/SIB. MSSA scores of 8, 9, 8 given 30 mg of valium per withdrawal protocol. Pt is reporting auditory hallucination. States he is unable to make out most of it but hears his name being called, often. Pt contracts for safety. Pt would like treatment after discharge. He has been able to work on his CD assessment form, but has not completed it yet. Will continue to monitor.

## 2022-04-02 NOTE — PLAN OF CARE
Goal Outcome Evaluation:    Plan of Care Reviewed With: patient        Problem: Alcohol Withdrawal  Goal: Alcohol Withdrawal Symptom Control  Outcome: Ongoing, Progressing        Patient is tolerating intakes with good appetite.  MSSA  9 and 8, Valium 10 mg given twice per protocol  this shift  Denies SI, SIB but reported occasional both auditoria and visual hallucinations.  Endorsed Anxiety and Depression 10/10.   PRN Atarax 25 mg given twice for anxiety relief with effect.  Patient seen in milieu, socializing with peers.  Continue with POC

## 2022-04-03 PROCEDURE — H0001 ALCOHOL AND/OR DRUG ASSESS: HCPCS | Performed by: COUNSELOR

## 2022-04-03 PROCEDURE — 250N000011 HC RX IP 250 OP 636: Performed by: NURSE PRACTITIONER

## 2022-04-03 PROCEDURE — 128N000001 HC R&B CD/MH ADULT

## 2022-04-03 PROCEDURE — H2032 ACTIVITY THERAPY, PER 15 MIN: HCPCS

## 2022-04-03 PROCEDURE — 128N000004 HC R&B CD ADULT

## 2022-04-03 PROCEDURE — 250N000013 HC RX MED GY IP 250 OP 250 PS 637: Performed by: PSYCHIATRY & NEUROLOGY

## 2022-04-03 RX ADMIN — FLUOXETINE 60 MG: 20 CAPSULE ORAL at 08:31

## 2022-04-03 RX ADMIN — HYDROXYZINE HYDROCHLORIDE 25 MG: 25 TABLET, FILM COATED ORAL at 20:22

## 2022-04-03 RX ADMIN — MULTIPLE VITAMINS W/ MINERALS TAB 1 TABLET: TAB at 08:31

## 2022-04-03 RX ADMIN — HYDROXYZINE HYDROCHLORIDE 25 MG: 25 TABLET, FILM COATED ORAL at 08:35

## 2022-04-03 RX ADMIN — DIAZEPAM 10 MG: 5 TABLET ORAL at 14:41

## 2022-04-03 RX ADMIN — GABAPENTIN 600 MG: 300 CAPSULE ORAL at 20:22

## 2022-04-03 RX ADMIN — THIAMINE HCL TAB 100 MG 100 MG: 100 TAB at 08:31

## 2022-04-03 RX ADMIN — GABAPENTIN 600 MG: 300 CAPSULE ORAL at 14:23

## 2022-04-03 RX ADMIN — METHADONE HYDROCHLORIDE 62 MG: 5 SOLUTION ORAL at 08:30

## 2022-04-03 RX ADMIN — NICOTINE POLACRILEX 2 MG: 2 GUM, CHEWING BUCCAL at 20:22

## 2022-04-03 RX ADMIN — TRAZODONE HYDROCHLORIDE 50 MG: 50 TABLET ORAL at 21:30

## 2022-04-03 RX ADMIN — HYDROXYZINE HYDROCHLORIDE 25 MG: 25 TABLET, FILM COATED ORAL at 14:26

## 2022-04-03 RX ADMIN — GABAPENTIN 600 MG: 300 CAPSULE ORAL at 08:30

## 2022-04-03 RX ADMIN — ONDANSETRON 4 MG: 4 TABLET, ORALLY DISINTEGRATING ORAL at 18:06

## 2022-04-03 RX ADMIN — NICOTINE 1 PATCH: 21 PATCH, EXTENDED RELEASE TRANSDERMAL at 08:00

## 2022-04-03 RX ADMIN — FOLIC ACID 1 MG: 1 TABLET ORAL at 08:31

## 2022-04-03 RX ADMIN — METHADONE HYDROCHLORIDE 63 MG: 5 SOLUTION ORAL at 14:23

## 2022-04-03 RX ADMIN — NICOTINE POLACRILEX 2 MG: 2 GUM, CHEWING BUCCAL at 16:14

## 2022-04-03 RX ADMIN — NICOTINE POLACRILEX 2 MG: 2 GUM, CHEWING BUCCAL at 08:38

## 2022-04-03 ASSESSMENT — ACTIVITIES OF DAILY LIVING (ADL)
DRESS: INDEPENDENT
HYGIENE/GROOMING: INDEPENDENT
DRESS: INDEPENDENT
HYGIENE/GROOMING: INDEPENDENT
ORAL_HYGIENE: INDEPENDENT
LAUNDRY: WITH SUPERVISION
LAUNDRY: WITH SUPERVISION
ORAL_HYGIENE: INDEPENDENT

## 2022-04-03 NOTE — PROGRESS NOTES
Pt.slept through the night. No safety or behavioral concerns. MSSA score was 3. Will continue to monitor.
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
## 2022-04-03 NOTE — PROGRESS NOTES
04/03/22 1800   Group Therapy Session   Group Attendance attended group session   Total # Attendees 11   Group Type expressive therapy   Group Topic Covered emotions/expression   Patient Response/Contribution cooperative with task     Art Therapy directive is to create a drawing of self as a landscape with drawing materials of pts choice. Pts were encouraged to use metaphor and imagery to create a personal self narrative.  Goals of directive: to identify personal strengths and goals, emotional expression, assessing motivation for change. Pt was an engaged participant, focused on task for the full duration of group. Pt finished drawing and briefly shared with group.  Pts mood was calm, pleasant participant.

## 2022-04-03 NOTE — PLAN OF CARE
"Goal Outcome Evaluation:    Plan of Care Reviewed With: patient        Pt was visible in the milieu. He is social with select peers. Pt ate 100% of dinner. MSSA scores of 9 and 5 given 10 mg of valium. Pt states he is having VH and AH. Denies any command hallucination. Pt endorses anxiety and depression 10 out of 10. Pt was a bit tense d/t not wanting to go to treatment. He says that he is going to be \"suicidal\" if he cant enjoy the nice weather. He is comfortable with 30 day treatment but says anything more would \"push him over the edge.\" pt contracts for safety. Denies SI/HI/SIB during shift. Will continue to monitor.       "

## 2022-04-03 NOTE — PROGRESS NOTES
"Writer met with patient to see where they were at with their assessment paperwork. He turned it in completed, stated he isn't sure he wants to go to [facility] anymore because they \"can make you stay up to 90 days, and I need to be out in the summer or it's going to make me want to drink more\". Patient stating he would like a 30 day residential program that allows him to go on passes like [facility] does, that allows smoking and takes Methadone. Also that if he were to go to [facility] he only wants to go to [facility] [place] because he was at [facility] before and didn't like his counselor. Writer expressed that it would be better if CD Assessment was completed and then treatment options can be discussed.   "

## 2022-04-04 PROCEDURE — 250N000011 HC RX IP 250 OP 636: Performed by: NURSE PRACTITIONER

## 2022-04-04 PROCEDURE — 250N000013 HC RX MED GY IP 250 OP 250 PS 637: Performed by: PHYSICIAN ASSISTANT

## 2022-04-04 PROCEDURE — 128N000004 HC R&B CD ADULT

## 2022-04-04 PROCEDURE — 128N000001 HC R&B CD/MH ADULT

## 2022-04-04 PROCEDURE — 99231 SBSQ HOSP IP/OBS SF/LOW 25: CPT | Performed by: PSYCHIATRY & NEUROLOGY

## 2022-04-04 PROCEDURE — 250N000013 HC RX MED GY IP 250 OP 250 PS 637: Performed by: NURSE PRACTITIONER

## 2022-04-04 PROCEDURE — 250N000013 HC RX MED GY IP 250 OP 250 PS 637: Performed by: PSYCHIATRY & NEUROLOGY

## 2022-04-04 RX ORDER — NICOTINE 21 MG/24HR
1 PATCH, TRANSDERMAL 24 HOURS TRANSDERMAL DAILY
Qty: 30 PATCH | Refills: 0 | Status: SHIPPED | OUTPATIENT
Start: 2022-04-04 | End: 2022-04-20

## 2022-04-04 RX ORDER — METHADONE HYDROCHLORIDE 5 MG/5ML
125 SOLUTION ORAL DAILY
Status: DISCONTINUED | OUTPATIENT
Start: 2022-04-05 | End: 2022-04-05 | Stop reason: HOSPADM

## 2022-04-04 RX ORDER — GABAPENTIN 300 MG/1
600 CAPSULE ORAL 3 TIMES DAILY
Qty: 120 CAPSULE | Refills: 0 | Status: SHIPPED | OUTPATIENT
Start: 2022-04-04 | End: 2022-06-01

## 2022-04-04 RX ORDER — METHADONE HYDROCHLORIDE 5 MG/5ML
63 SOLUTION ORAL DAILY
Status: COMPLETED | OUTPATIENT
Start: 2022-04-04 | End: 2022-04-04

## 2022-04-04 RX ORDER — MULTIPLE VITAMINS W/ MINERALS TAB 9MG-400MCG
1 TAB ORAL DAILY
Qty: 30 TABLET | Refills: 0 | Status: SHIPPED | OUTPATIENT
Start: 2022-04-05 | End: 2022-04-20

## 2022-04-04 RX ORDER — POLYETHYLENE GLYCOL 3350 17 G
2 POWDER IN PACKET (EA) ORAL
Status: DISCONTINUED | OUTPATIENT
Start: 2022-04-04 | End: 2022-04-05 | Stop reason: HOSPADM

## 2022-04-04 RX ORDER — LANOLIN ALCOHOL/MO/W.PET/CERES
100 CREAM (GRAM) TOPICAL DAILY
Qty: 30 TABLET | Refills: 0 | Status: SHIPPED | OUTPATIENT
Start: 2022-04-04 | End: 2022-04-20

## 2022-04-04 RX ORDER — FOLIC ACID 1 MG/1
1 TABLET ORAL DAILY
Qty: 30 TABLET | Refills: 1 | Status: SHIPPED | OUTPATIENT
Start: 2022-04-04 | End: 2022-04-20

## 2022-04-04 RX ORDER — ACETAMINOPHEN 500 MG
500 TABLET ORAL EVERY 6 HOURS PRN
Status: DISCONTINUED | OUTPATIENT
Start: 2022-04-04 | End: 2022-04-04

## 2022-04-04 RX ORDER — LISINOPRIL 10 MG/1
20 TABLET ORAL DAILY
Qty: 30 TABLET | Refills: 0 | Status: SHIPPED | OUTPATIENT
Start: 2022-04-04 | End: 2022-04-20 | Stop reason: DRUGHIGH

## 2022-04-04 RX ADMIN — NICOTINE POLACRILEX 2 MG: 2 GUM, CHEWING BUCCAL at 18:14

## 2022-04-04 RX ADMIN — NICOTINE 1 PATCH: 21 PATCH, EXTENDED RELEASE TRANSDERMAL at 08:19

## 2022-04-04 RX ADMIN — NICOTINE POLACRILEX 2 MG: 2 LOZENGE ORAL at 20:19

## 2022-04-04 RX ADMIN — FLUOXETINE 60 MG: 20 CAPSULE ORAL at 08:15

## 2022-04-04 RX ADMIN — LISINOPRIL 20 MG: 20 TABLET ORAL at 08:15

## 2022-04-04 RX ADMIN — ONDANSETRON 4 MG: 4 TABLET, ORALLY DISINTEGRATING ORAL at 08:15

## 2022-04-04 RX ADMIN — THIAMINE HCL TAB 100 MG 100 MG: 100 TAB at 08:15

## 2022-04-04 RX ADMIN — ONDANSETRON 4 MG: 4 TABLET, ORALLY DISINTEGRATING ORAL at 14:09

## 2022-04-04 RX ADMIN — GABAPENTIN 600 MG: 300 CAPSULE ORAL at 08:14

## 2022-04-04 RX ADMIN — MULTIPLE VITAMINS W/ MINERALS TAB 1 TABLET: TAB at 08:15

## 2022-04-04 RX ADMIN — ONDANSETRON 4 MG: 4 TABLET, ORALLY DISINTEGRATING ORAL at 20:19

## 2022-04-04 RX ADMIN — TRAZODONE HYDROCHLORIDE 50 MG: 50 TABLET ORAL at 21:56

## 2022-04-04 RX ADMIN — METHADONE HYDROCHLORIDE 62 MG: 5 SOLUTION ORAL at 08:14

## 2022-04-04 RX ADMIN — FOLIC ACID 1 MG: 1 TABLET ORAL at 08:15

## 2022-04-04 RX ADMIN — HYDROXYZINE HYDROCHLORIDE 25 MG: 25 TABLET, FILM COATED ORAL at 08:15

## 2022-04-04 RX ADMIN — METHADONE HYDROCHLORIDE 63 MG: 5 SOLUTION ORAL at 14:09

## 2022-04-04 RX ADMIN — GABAPENTIN 600 MG: 300 CAPSULE ORAL at 14:08

## 2022-04-04 RX ADMIN — GABAPENTIN 600 MG: 300 CAPSULE ORAL at 20:19

## 2022-04-04 RX ADMIN — ALUMINUM HYDROXIDE, MAGNESIUM HYDROXIDE, AND SIMETHICONE 30 ML: 200; 200; 20 SUSPENSION ORAL at 21:56

## 2022-04-04 RX ADMIN — HYDROXYZINE HYDROCHLORIDE 25 MG: 25 TABLET, FILM COATED ORAL at 20:19

## 2022-04-04 RX ADMIN — HYDROXYZINE HYDROCHLORIDE 25 MG: 25 TABLET, FILM COATED ORAL at 16:22

## 2022-04-04 ASSESSMENT — ACTIVITIES OF DAILY LIVING (ADL)
LAUNDRY: WITH SUPERVISION
ORAL_HYGIENE: INDEPENDENT
HYGIENE/GROOMING: INDEPENDENT
HYGIENE/GROOMING: INDEPENDENT
ORAL_HYGIENE: INDEPENDENT
DRESS: INDEPENDENT
DRESS: INDEPENDENT

## 2022-04-04 NOTE — PROGRESS NOTES
Call recedived from [facility].  They have a bed available and Pt is scheduled for admission tomorrow, 4/5/22 @ 1000.  Pt informed.

## 2022-04-04 NOTE — PLAN OF CARE
Goal Outcome Evaluation:    Plan of Care Reviewed With: patient        Problem: Alcohol Withdrawal  Goal: Alcohol Withdrawal Symptom Control  Outcome: Ongoing, Progressing      Patient  is tolerating intakes.  Endorsed Anxiety and depression a 10/10, PRN Atarax 25 mg given with relief.  C/O nausea,PRN Zofran 4 mg ODT given at 1400 with relief .  Denies SI,SIB but hallucinations.  VS'S but bradycardia monitored closely. Patient is asymptomatic  Patient seen engaging with peers watching TV once but in his room most of the time.  Patient had mentioned taking a shower, hasn't.  C/O pain requesting something stronger than Ibuprofen. Writer tried and advised patient on how pain med's are admin but wont listen. Writer  notified MD, ordered him Tylenol which was d/vivi and methadone dose adjusted for daily. Patient is aware and settled with that.  Continue with POC.

## 2022-04-04 NOTE — PLAN OF CARE
Problem: Substance Misuse (Alcohol Withdrawal)  Goal: Readiness for Change Identified  Outcome: Ongoing, Progressing  Intervention: Partner to Facilitate Behavior Change  Recent Flowsheet Documentation  Taken 4/3/2022 2146 by Kehinde Valenzuela RN  Supportive Measures:   active listening utilized   decision-making supported   positive reinforcement provided   self-care encouraged   self-reflection promoted   self-responsibility promoted   Goal Outcome Evaluation:    Plan of Care Reviewed With: patient     Pt was visible in the milieu. He is eating and drinking appropriately. He  is attending programing. Pt MSSA scores of 5 and 6 given no withdrawal medications per unit protocol. Pt felt nausea after dinner was given PRN Zofran.  Pt endorsed anxiety and depression 10 out 10. Denies SI/HI/SIB. Will continue to monitor.

## 2022-04-04 NOTE — PROGRESS NOTES
Pt signed ISABEL's and referrals were made to [facility] and [facility].  CM to follow up with programs tomorrow, 4/5/22.

## 2022-04-04 NOTE — PROGRESS NOTES
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   The patient's care was discussed with the treatment team during the daily team meeting and/or staff's chart notes were reviewed.  Staff report patient has been visible in the milieu,  no acute eventsovernight.     Patient's mood is better  Improving energy improving sleep improving  Appetite improving  Improving motivation interest   Denied suicidal/homicidal ideation/plan intent.  Denied psychosis  No prior suicde attempts  No access to gun  Pt is in alcohol withdrawal still being monitered every 4 hrs for it,   Pt mssa score are monitered  Tolerating meds and has no side effects.              Medications:     Current Facility-Administered Medications   Medication     acetaminophen (TYLENOL) tablet 500 mg     alum & mag hydroxide-simethicone (MAALOX) suspension 30 mL     atenolol (TENORMIN) tablet 50 mg     diazepam (VALIUM) tablet 5-20 mg     FLUoxetine (PROzac) capsule 60 mg     folic acid (FOLVITE) tablet 1 mg     gabapentin (NEURONTIN) capsule 600 mg     hydrALAZINE (APRESOLINE) half-tab 12.5 mg     hydrOXYzine (ATARAX) tablet 25 mg     lisinopril (ZESTRIL) tablet 20 mg     loperamide (IMODIUM) capsule 2 mg     methadone (DOLOPHINE) solution 62 mg     methadone (DOLOPHINE) solution 63 mg     multivitamin w/minerals (THERA-VIT-M) tablet 1 tablet     naloxone (NARCAN) injection 0.2 mg    Or     naloxone (NARCAN) injection 0.4 mg    Or     naloxone (NARCAN) injection 0.2 mg    Or     naloxone (NARCAN) injection 0.4 mg     nicotine (NICODERM CQ)  "21 MG/24HR 24 hr patch 1 patch     nicotine (NICORETTE) gum 2 mg     nicotine Patch in Place     ondansetron (ZOFRAN-ODT) ODT tab 4 mg     senna-docusate (SENOKOT-S/PERICOLACE) 8.6-50 MG per tablet 1 tablet     thiamine (B-1) tablet 100 mg     traZODone (DESYREL) tablet 50 mg             Allergies:     Allergies   Allergen Reactions     Pcn [Penicillins]      Possibly rash noted-- unsure     Pcn [Penicillins]      Sulfa Drugs      Possibly rash - uncertain     Sulfa Drugs             Psychiatric Examination:   Blood pressure (!) 135/91, pulse 55, temperature 97.4  F (36.3  C), temperature source Temporal, resp. rate 16, height 1.803 m (5' 11\"), weight 108.9 kg (240 lb), SpO2 96 %.  Weight is 240 lbs 0 oz  Body mass index is 33.47 kg/m .    Appearance:  awake, alert and adequately groomed  Attitude:  cooperative  Eye Contact:  good  Mood:  better  Affect:  appropriate and in normal range and mood congruent  Speech:  clear, coherent rate /rhythm are normal in rate and volume  Psychomotor Behavior:  no evidence of tardive dyskinesia, dystonia, or tics and intact station, gait and muscle tone  Throught Process:  logical  Associations:  no loose associations  Thought Content:  no evidence of suicidal ideation or homicidal ideation, no evidence of psychotic thought, no auditory hallucinations present and no visual hallucinations present  Insight:  limited  Judgement:  limited  Oriented to:  time, person, and place  Attention Span and Concentration:  intact  Recent and Remote Memory:  intact  Language fund of knowledge are adequate         Labs:     No results found for: NTBNPI, NTBNP  Lab Results   Component Value Date    WBC 5.6 04/01/2022    HGB 15.5 04/01/2022    HCT 45.3 04/01/2022    MCV 97 04/01/2022     04/01/2022     Lab Results   Component Value Date    TSH 3.15 04/01/2022         DX Major depressive disorder severe recurrent without psychosis  Suicide ideation with a plan  Alcohol use disorder " severe   Alcohol withdrawal  Noncompliance with medication  Opiate use disorder on Suboxone maintenance  Nicotine dependence  Marijuana dependence  Cocaine abuse methamphetamine abuse  Generalized anxiety  PTSD     PLAN   Alcohol intoxication/withdrawal, presently is on MSSA protocol with Valium. Continue the same MSSA protocol as ordered. Continue thiamine 100 mg p.o. daily, M.V.I. one p.o. daily and folate 1 mg p.o. Daily  Will continue mssa protocal to detox off alcohol on valium,  Pt is c/o of termor , agitation poor sleep and poor appetite, he has sweats, feels shakey  On mssa client scored scored5 today and  needed needed  0mg po as of yet , total dose since admission was 130 mg    MSSA    Eating Disturbances: ate and enjoyed all of it or not applicable  Tremor: 1 - not visibly apparent but can be felt by the examiner placing his fingertip slightly against the patient's fingertips  Sleep Disturbance: slept through the night or not applicable  Clouding of Sensorium: no evidence  Hallucinations: 1 - auditory  Quality of Contact: 0 - awareness of examiner and people around him/her  Agitation: 0 - normal activity  Paroxysmal Sweats: 0 - no observed sweating  Temperature: 99.5 or below  Pulse: 0 - 69 or below  Total MSSA Score: 3    We will continue Prozac    We will switch all methadone to 125 mg to tomorrow morning  Patient will have completed detox from alcohol  Laboratory/Imaging: reviewed with patient   Consults: internal medicine consult reviewed  Patient will be treated in therapeutic milieu with appropriate individual and group therapies as described.  PDMP CHECKED     Supportive psychotheraoy provided, austyn talked about recovery enviroment, relapse prevention, triggers to use.  Discussed with patient many issues of addiction,triggers, relapse, and establishing a solid recovery program.  Asked pt to be med complinat   Medical diagnoses to be addressed this admission:    Plan:     Diagnoses:  #Alcohol  Withdrawal: Drinking 1 L daily. No hx of withdrawal seizures or DTs. Management per Psychiatry.  #Transaminitis: ALT &  on admit, improved to 117 & 88 on 4/1. Downtrending from admission in 3/2022. US (3/16/22) with hepatic steatosis. Repeat CMP per PCP.  #Hypokalemia: K 3.2 on 4/1/22. Give 40 mEq KCl x 1. Repeat K in AM.  #HTN: BP elevated on 3/31 in setting of withdrawal, now improved. Continue PTA Lisinopril 20 mg daily. Notify Medicine if BP >180/110 during withdrawal.  #Opiate Use Disorder: On Methadone maintenance. Management per Psychiatry.  #Depression, Anxiety: On Prozac and Gabapentin PTA. Management per Psychiatry.     Case d/w RN and Psychiatrist, no concerns for DTs or confusion at this time. Medicine will follow up on repeat K on 4/2.      Please page the on-call provider for any follow up medical concerns if they arise including new onset confusion or hallucinations.     Legal Status: voluntary    Safety Assessment:   Checks:  15 min  Precautions: withdrawal precautions  Pt has not required locked seclusion or restraints in the past 24 hours to maintain safety, please refer to RN documentation for further details.  Discussed with patient many issues of addiction,triggers, relapse, and establishing a solid recovery program.  Able to give informed consent:  YES   Discussed Risks/Benefits/Side Effects/Alternatives: YES    After discussion of the indications, risks, benefits, alternatives and consequences of no treatment, the patient elects to complete detox and to treatment

## 2022-04-05 VITALS
DIASTOLIC BLOOD PRESSURE: 85 MMHG | OXYGEN SATURATION: 96 % | SYSTOLIC BLOOD PRESSURE: 134 MMHG | BODY MASS INDEX: 33.6 KG/M2 | HEIGHT: 71 IN | RESPIRATION RATE: 16 BRPM | TEMPERATURE: 97.2 F | HEART RATE: 55 BPM | WEIGHT: 240 LBS

## 2022-04-05 PROCEDURE — 99239 HOSP IP/OBS DSCHRG MGMT >30: CPT | Performed by: PSYCHIATRY & NEUROLOGY

## 2022-04-05 PROCEDURE — 250N000013 HC RX MED GY IP 250 OP 250 PS 637: Performed by: PHYSICIAN ASSISTANT

## 2022-04-05 PROCEDURE — 250N000013 HC RX MED GY IP 250 OP 250 PS 637: Performed by: PSYCHIATRY & NEUROLOGY

## 2022-04-05 RX ADMIN — GABAPENTIN 600 MG: 300 CAPSULE ORAL at 08:12

## 2022-04-05 RX ADMIN — LISINOPRIL 20 MG: 20 TABLET ORAL at 08:12

## 2022-04-05 RX ADMIN — THIAMINE HCL TAB 100 MG 100 MG: 100 TAB at 08:12

## 2022-04-05 RX ADMIN — FOLIC ACID 1 MG: 1 TABLET ORAL at 08:12

## 2022-04-05 RX ADMIN — NICOTINE 1 PATCH: 21 PATCH, EXTENDED RELEASE TRANSDERMAL at 08:12

## 2022-04-05 RX ADMIN — FLUOXETINE 60 MG: 20 CAPSULE ORAL at 08:12

## 2022-04-05 RX ADMIN — METHADONE HYDROCHLORIDE 125 MG: 5 SOLUTION ORAL at 08:08

## 2022-04-05 RX ADMIN — MULTIPLE VITAMINS W/ MINERALS TAB 1 TABLET: TAB at 08:12

## 2022-04-05 NOTE — PLAN OF CARE
"  Problem: Alcohol Withdrawal  Goal: Alcohol Withdrawal Symptom Control  Outcome: Ongoing, Progressing   Goal Outcome Evaluation:  Pt status improving.  Reports gastric upset, requesting both Maalox and zofran this evening after dinner.  Vital signs are within normal limits for patient--he reports a usual HR is bradycardic. Alert and oriented X 3, no SI, no SIB.  Pt has a blunted affect, reports feeling \"ok\" but appears comfortable when observed in the lounge.  [facility].  Discharge pharmacy informed.  Will continue with evaluations.                    "

## 2022-04-05 NOTE — DISCHARGE SUMMARY
Shahram Young MRN# [id number]   Age: 36 year old YOB: 1985     Date of Admission:  3/31/2022  Date of Discharge:  4/5/2022  Admitting Physician:  Toby Ayala MD  Discharge Physician:  Toby Ayala MD      DISCHARGE  DX  Major depressive disorder severe recurrent without psychosis    Alcohol use disorder severe       Opiate use disorder on Suboxone maintenance  Nicotine dependence  Marijuana dependence  Cocaine abuse methamphetamine abuse  Generalized anxiety  PTSD           Event Leading to Hospitalization:     See Admission note by admitting provider for patient encounter. for additional details.          Hospital Course:   PATIENT was admitted to [facility] attending  under DR ayala, please review the detailed admit note on 4 1/22   The patient was placed under status 15 (15 minute checks) to ensure patient safety.   MSSA protocol was initiated due to the patient's history of alcohol abuse and concern for withdrawal symptoms.  CBC, BMP and utox obtained.    All outpatient medications were continued  Patient was currently on methadone maintenance  Patient was continued on Prozac 60 mg to target his depression  PATIENTdid participate in groups and was visible in the milieu.     The patient's symptoms of alcohol withdrawal improved.     Patients energy motivation , sleep appetite improved.  Pt completed detox . It was un eventful.      Discussed with patient medications for craving.  Spoke with patient about triggers coping skills relapse prevention.    CONSULTS DONE DURING PATIENTS HOSPITALIZATION.  Patient was seen by medicine on date 4/1/2022    This as per their medical consult    Diagnoses:  #Alcohol Withdrawal: Drinking 1 L daily. No hx of withdrawal seizures or DTs. Management per Psychiatry.  #Transaminitis: ALT &  on admit, improved to 117 & 88 on 4/1. Downtrending from admission in 3/2022. US (3/16/22) with hepatic steatosis. Repeat CMP per PCP.  #Hypokalemia:  K 3.2 on 4/1/22. Give 40 mEq KCl x 1. Repeat K in AM.  #HTN: BP elevated on 3/31 in setting of withdrawal, now improved. Continue PTA Lisinopril 20 mg daily. Notify Medicine if BP >180/110 during withdrawal.  #Opiate Use Disorder: On Methadone maintenance. Management per Psychiatry.  #Depression, Anxiety: On Prozac and Gabapentin PTA. Management per Psychiatry.     Case d/w RN and Psychiatrist, no concerns for DTs or confusion at this time. Medicine will follow up on repeat K on 4/2.      Please page the on-call provider for any follow up medical concerns if they arise including new onset confusion or hallucinations.         Pt was seen by cm  As per recommendations from cm  Pt has [employer] scheduled for pick-up today, 4/5 at 9:30 am for transfer to [place] [address]. Phone number for [employer] company is [phone]. AVS updated.           Labs:reviewed with patient     No results found for this or any previous visit (from the past 48 hour(s)).      Recent Results (from the past 240 hour(s))   Alcohol breath test POCT    Collection Time: 03/31/22 12:30 PM   Result Value Ref Range    Alcohol Breath Test 0.171 (A) 0.00 - 0.01   Drug abuse screen 6 urine (chem dep) ([facility])    Collection Time: 03/31/22 12:53 PM   Result Value Ref Range    Amphetamines Urine Screen Positive (A) Screen Negative    Barbiturates Urine Screen Negative Screen Negative    Benzodiazepines Urine Screen Positive (A) Screen Negative    Cannabinoids Urine Screen Positive (A) Screen Negative    Cocaine Urine Screen Positive (A) Screen Negative    Ethanol Urine Screen Positive (A) Screen Negative    Opiates Urine Screen Negative Screen Negative   Drug abuse screen 1 urine (ED)    Collection Time: 03/31/22 12:53 PM   Result Value Ref Range    Amphetamines Urine Screen Positive (A) Screen Negative    Barbiturates Urine Screen Negative Screen Negative    Benzodiazepines Urine Screen Positive (A) Screen Negative    Cannabinoids Urine Screen Positive (A)  Screen Negative    Cocaine Urine Screen Positive (A) Screen Negative    Opiates Urine Screen Negative Screen Negative   Comprehensive metabolic panel    Collection Time: 03/31/22  1:48 PM   Result Value Ref Range    Sodium 136 133 - 144 mmol/L    Potassium 3.5 3.4 - 5.3 mmol/L    Chloride 98 94 - 109 mmol/L    Carbon Dioxide (CO2) 28 20 - 32 mmol/L    Anion Gap 10 3 - 14 mmol/L    Urea Nitrogen 8 7 - 30 mg/dL    Creatinine 0.56 (L) 0.66 - 1.25 mg/dL    Calcium 9.5 8.5 - 10.1 mg/dL    Glucose 95 70 - 99 mg/dL    Alkaline Phosphatase 87 40 - 150 U/L     (H) 0 - 45 U/L     (H) 0 - 70 U/L    Protein Total 8.0 6.8 - 8.8 g/dL    Albumin 4.0 3.4 - 5.0 g/dL    Bilirubin Total 0.6 0.2 - 1.3 mg/dL    GFR Estimate >90 >60 mL/min/1.73m2   Asymptomatic COVID-19 Virus (Coronavirus) by PCR Nasopharyngeal    Collection Time: 03/31/22  1:48 PM    Specimen: Nasopharyngeal; Swab   Result Value Ref Range    SARS CoV2 PCR Negative Negative   CBC with platelets and differential    Collection Time: 03/31/22  1:48 PM   Result Value Ref Range    WBC Count 8.6 4.0 - 11.0 10e3/uL    RBC Count 4.73 4.40 - 5.90 10e6/uL    Hemoglobin 15.7 13.3 - 17.7 g/dL    Hematocrit 45.9 40.0 - 53.0 %    MCV 97 78 - 100 fL    MCH 33.2 (H) 26.5 - 33.0 pg    MCHC 34.2 31.5 - 36.5 g/dL    RDW 13.2 10.0 - 15.0 %    Platelet Count 334 150 - 450 10e3/uL    % Neutrophils 73 %    % Lymphocytes 17 %    % Monocytes 7 %    % Eosinophils 1 %    % Basophils 2 %    % Immature Granulocytes 0 %    NRBCs per 100 WBC 0 <1 /100    Absolute Neutrophils 6.3 1.6 - 8.3 10e3/uL    Absolute Lymphocytes 1.5 0.8 - 5.3 10e3/uL    Absolute Monocytes 0.6 0.0 - 1.3 10e3/uL    Absolute Eosinophils 0.1 0.0 - 0.7 10e3/uL    Absolute Basophils 0.2 0.0 - 0.2 10e3/uL    Absolute Immature Granulocytes 0.0 <=0.4 10e3/uL    Absolute NRBCs 0.0 10e3/uL   Comprehensive metabolic panel    Collection Time: 04/01/22  6:59 AM   Result Value Ref Range    Sodium 138 133 - 144 mmol/L     Potassium 3.2 (L) 3.4 - 5.3 mmol/L    Chloride 98 94 - 109 mmol/L    Carbon Dioxide (CO2) 33 (H) 20 - 32 mmol/L    Anion Gap 7 3 - 14 mmol/L    Urea Nitrogen 13 7 - 30 mg/dL    Creatinine 0.66 0.66 - 1.25 mg/dL    Calcium 9.4 8.5 - 10.1 mg/dL    Glucose 74 70 - 99 mg/dL    Alkaline Phosphatase 76 40 - 150 U/L    AST 88 (H) 0 - 45 U/L     (H) 0 - 70 U/L    Protein Total 7.0 6.8 - 8.8 g/dL    Albumin 3.4 3.4 - 5.0 g/dL    Bilirubin Total 1.1 0.2 - 1.3 mg/dL    GFR Estimate >90 >60 mL/min/1.73m2   Lipid panel    Collection Time: 04/01/22  6:59 AM   Result Value Ref Range    Cholesterol 200 (H) <200 mg/dL    Triglycerides 48 <150 mg/dL    Direct Measure HDL 86 >=40 mg/dL    LDL Cholesterol Calculated 104 (H) <=100 mg/dL    Non HDL Cholesterol 114 <130 mg/dL   TSH with free T4 reflex and/or T3 as indicated    Collection Time: 04/01/22  6:59 AM   Result Value Ref Range    TSH 3.15 0.40 - 4.00 mU/L   CBC with platelets and differential    Collection Time: 04/01/22  6:59 AM   Result Value Ref Range    WBC Count 5.6 4.0 - 11.0 10e3/uL    RBC Count 4.67 4.40 - 5.90 10e6/uL    Hemoglobin 15.5 13.3 - 17.7 g/dL    Hematocrit 45.3 40.0 - 53.0 %    MCV 97 78 - 100 fL    MCH 33.2 (H) 26.5 - 33.0 pg    MCHC 34.2 31.5 - 36.5 g/dL    RDW 13.2 10.0 - 15.0 %    Platelet Count 316 150 - 450 10e3/uL    % Neutrophils 55 %    % Lymphocytes 26 %    % Monocytes 11 %    % Eosinophils 5 %    % Basophils 2 %    % Immature Granulocytes 1 %    NRBCs per 100 WBC 0 <1 /100    Absolute Neutrophils 3.1 1.6 - 8.3 10e3/uL    Absolute Lymphocytes 1.5 0.8 - 5.3 10e3/uL    Absolute Monocytes 0.6 0.0 - 1.3 10e3/uL    Absolute Eosinophils 0.3 0.0 - 0.7 10e3/uL    Absolute Basophils 0.1 0.0 - 0.2 10e3/uL    Absolute Immature Granulocytes 0.0 <=0.4 10e3/uL    Absolute NRBCs 0.0 10e3/uL   Potassium    Collection Time: 04/02/22  6:46 AM   Result Value Ref Range    Potassium 3.7 3.4 - 5.3 mmol/L   Extra Purple Top Tube    Collection Time: 04/02/22  6:46 AM    Result Value Ref Range    Hold Specimen JIC             Because this patient meets criteria for an Alcohol Use Disorder, I performed the following brief intervention on the date of this note:              1) Expressed concern that the patient is drinking at unhealthy levels known to increase their risk of alcohol related problems              2) Gave feedback linking alcohol use and health, including personalized feedback explaining how alcohol use can interact with their medical and/or psychiatric problems, and with prescribed medications.              3) Advised patient to abstain.    PT counseled on nicotine cessation and nicotine replacement provided    Discussed with patient many issues of addiction,triggers, relapse, and establishing a solid recovery program.    DISCHARGE MENTAL STATUS EXAMINATION:  The patient is alert, oriented x3.  Good fund of knowledge.  Good use of language.  Recent and remote memory, language, fund of knowledge are all adequate.  Euthymic mood congruent affect  Speech normal rate/rhythm linear tp no loose asso,The patient does not have any active suicidal or homicidal ideation.  Does not have any auditory or visual hallucination.  Fair insight/judgment At this time, the patient was stable to be discharged.        Pt was not determined to not be a danger to himself or others. At the current time of discharge, the patient does not meet criteria for involuntary hospitalization. On the day of discharge, the patient reports that they do not have suicidal or homicidal ideation and would never hurt themselves or others. Steps taken to minimize risk include: assessing patient s behavior and thought process daily during hospital stay, discharging patient with adequate plan for follow up for mental and physical health and discussing safety plan of returning to the hospital should the patient ever have thoughts of harming themselves or others. Therefore, based on all available evidence including  the factors cited above, the patient does not appear to be at imminent risk for self-harm, and is appropriate for outpatient level of care.     Educated about side effects/risk vs benefits /alternative including non treatment.Pt consented to be on medication.     .Total time spent on discharge summary more than 35 min  More than  20 min  planning, coordination of care, medication reconciliation and performance of physical exam on day of discharge.Care was coordinated with unit RN and unit therapist         Review of your medicines      START taking      Dose / Directions   multivitamin w/minerals tablet  Used for: Alcohol withdrawal syndrome without complication (H)      Dose: 1 tablet  Take 1 tablet by mouth daily  Quantity: 30 tablet  Refills: 0        CONTINUE these medicines which have NOT CHANGED      Dose / Directions   FLUoxetine 20 MG capsule  Commonly known as: PROzac  Used for: Anxiety disorder, unspecified type      Dose: 60 mg  Take 3 capsules (60 mg) by mouth daily  Quantity: 90 capsule  Refills: 0     folic acid 1 MG tablet  Commonly known as: FOLVITE  Used for: Alcohol abuse      Dose: 1 mg  Take 1 tablet (1 mg) by mouth daily  Quantity: 30 tablet  Refills: 1     gabapentin 300 MG capsule  Commonly known as: NEURONTIN  Used for: Alcohol dependence with unspecified alcohol-induced disorder (H)      Dose: 600 mg  Take 2 capsules (600 mg) by mouth 3 times daily  Quantity: 120 capsule  Refills: 0     lisinopril 10 MG tablet  Commonly known as: ZESTRIL  Used for: Alcohol withdrawal syndrome without complication (H)      Dose: 20 mg  Take 2 tablets (20 mg) by mouth daily  Quantity: 30 tablet  Refills: 0     methadone 5 MG/5ML solution  Commonly known as: DOLOPHINE      Dose: 125 mg  Take 125 mg by mouth daily  Refills: 0     nicotine 21 MG/24HR 24 hr patch  Commonly known as: NICODERM CQ  Used for: Alcohol withdrawal syndrome without complication (H)      Dose: 1 patch  Place 1 patch onto the skin  "daily  Quantity: 30 patch  Refills: 0     thiamine 100 MG tablet  Commonly known as: B-1  Used for: Alcohol withdrawal syndrome without complication (H)      Dose: 100 mg  Take 1 tablet (100 mg) by mouth daily  Quantity: 30 tablet  Refills: 0        STOP taking    multivitamin, therapeutic Tabs tablet              Where to get your medicines      These medications were sent to [facility] [place], [place] - [address]  [address] [facility] [address], [facility] [address]    Phone: [phone]     FLUoxetine 20 MG capsule    folic acid 1 MG tablet    gabapentin 300 MG capsule    lisinopril 10 MG tablet    multivitamin w/minerals tablet    nicotine 21 MG/24HR 24 hr patch    thiamine 100 MG tablet          Disposition: [facility] treatment     Facts about COVID19 at www.cdc.gov/COVID19 and www.[place].gov/covid19     Keeping hands clean is one of the most important steps we can take to avoid getting sick and spreading germs to others.  Please wash your hands frequently and lather with soap for at least 20 seconds!     Medical Follow-Up:Follow-up Appointment: [facility]: [address], [place], [place]  # [phone]   You agree to schedule a follow up appointment once secure in your treatment context.  Resources: Resources for on line recovery meetings:  *due to covid-19 AA/NA meetings are being held online*  AA meetings can be found online; search for them at: http://aa-intergroup.org/directory.php  AA meetings via [place] for [place] area can be found online at: https://a[place].org/find-a-meeting/holiday-closings/  NA meetings via [place] for [place] area can be found online at: https://sites.google.com/view/[place]regionofnarcoticsanonymous/home?authuser=2  Www.[employer].org  has online resources for meeting and recovery care including Podcast \"Let's Talk:Addiction & Recovery Podcasts  Www.mnrecovery.org          .        \"Much or all of the text in this note was generated through the use of " "Dragon Dictate voice to text software. Errors in spelling or words which appear to be out of contact are unintentional, may be present due having escaped editing\"     "

## 2022-04-05 NOTE — PROGRESS NOTES
Patient slept 7 hours, uninterrupted, breathing quietly during the shift.    No complaints or concerns. MSSA score of 2    Precautions: falls, SI, Elopement  Code 1  Status 15  Voluntary.

## 2022-04-05 NOTE — PROGRESS NOTES
Pt has [employer] scheduled for pick-up today, 4/5 at 9:30 am for transfer to [place] [address]. Phone number for [employer] company is [phone]. AVS updated.

## 2022-04-05 NOTE — PLAN OF CARE
Goal Outcome Evaluation:  Patient has experienced a largely positive day on [facility].  He reports absence of suicidal ideation while on [facility].  He was dosed today with ordered Methadone amount, and discharged with all appropriate medications and personal effects in hand.  Will continue to monitor as prudent.

## 2022-04-05 NOTE — DISCHARGE INSTRUCTIONS
Behavioral Discharge Planning and Instructions  THANK YOU FOR CHOOSING THE [facility]  [facility]  [phone]    Summary: You were admitted to [facility] on 3/31/22 for detoxification from alcohol.  A medical exam was performed that included lab work. You have met with a  and opted to enter residential treatment with [facility].  Please take care and make your recovery a priority, Mr. Young!    [facility] I: [address] [place] [address]  Telephone: [phone]    Main Diagnosis: Alcohol Withdrawal: COMPLICATED; Major depressive disorder severe recurrent without psychosis;   opioid use disorder (on Methadone maintenance); Nicotine dependence; Generalized anxiety; PTSD    Major Treatments, Procedures and Findings:  You have withdrawn from alcohol using lorazepam.  You have met with a  to develop a treatment plan for discharge.  You have had labs drawn and those results have been reviewed with you. Please take a copy of your lab work with you to your next appointment.    Symptoms to Report:  If you experience more anxiety, confusion, sleeplessness, deep sadness or thoughts of suicide, notify your treatment team or notify your primary care physician. IF ANY OF THE SYMPTOMS YOU ARE EXPERIENCING ARE A MEDICAL EMERGENCY CALL 911 IMMEDIATELY.     Lifestyle Adjustment: Adjust your lifestyle to get enough sleep, relaxation, exercise and  good nutrition. Continue to develop healthy coping skills to decrease stress and promote a sober living environment. Do not use alcohol, illegal drugs or addictive medications other than what is currently prescribed. AA, NA, and  Sponsor are excellent resources for support.     Facts about COVID19 at www.cdc.gov/COVID19 and www.[place].gov/covid19  Keeping hands clean is one of the most important steps we can take to avoid getting sick and spreading germs to others.  Please wash your hands frequently and lather with soap for at least 20  "seconds!    Follow-up Appointment: [facility]: [address], [place], [place]  # [phone]   You agree to schedule a follow up appointment once secure in your treatment context.  Resources: Resources for on line recovery meetings:  *due to covid-19 AA/NA meetings are being held online*  AA meetings can be found online; search for them at: http://aa-intergroup.org/directory.php  AA meetings via [place] for [place] area can be found online at: https://a[place].org/find-a-meeting/holiday-closings/  NA meetings via [place] for [place] area can be found online at: https://sites.[employer].com/view/mnregionofnarcoticsanonymous/home?authuser=2  [employer]  has online resources for meeting and recovery care including Podcast \"Let's Talk:Addiction & Recovery Podcasts  Www.mnrecTrialReach.org     DISCHARGE RESOURCES: -SMART Recovery - self management for addiction recovery:  www.smartrecovery.org    -Pathways ~ A Health Crisis Resource & Support Center: 208.377.7958.  -[facility] [phone]   -[facility]ake 497-811-3449 or 483-778-6827.  -Suicide Awareness Voices of Education (SAVE) (www.save.org): 063-438-LXZR (8938)  -National Suicide Prevention Line (www.mentalhealthmn.org): 585-335-RARH (5967)  -National [place] on Mental Illness (www.mn.colette.org): 751.627.6042 or 029-503-4451.  -Xsaw2jggy: text the word LIFE to 76810 for immediate support and crisis intervention  -Mental Health Consumer/Survivor Network of [place] (www.mhcsn.net): 573.615.2609 or 310-325-2562  -Mental Health Association of [place] (www.mentalhealth.org): 601.627.4082 or 176-874-3633     -Substance Abuse and Mental Health Services (www.samhsa.gov)  -Harm Reduction Coalition (www. Harmreduction.org)  -www.prescribetoprevent.org or http://prescribetoprevent.org/video  -Poison control 8-985-215-4732   **[place] Opioid Prevention Coalition: www.opioidcoalition.org    Sober Support Group " Information:  AA/NA & Sponsor/Support  -Alcoholics Anonymous (www.alcoholics-anonymous.org): for local information 24 hours/day  -AA Intergroup service office in [place] (http://www.aastpaul.org/) 852.711.8866  -AA Intergroup service office in [facility]: 843.237.9950. (http://www.aamin[place].org/)  -Narcotics Anonymous (www.naminn[place].org) (660) 816-8178   **Sober Fun Activities: www.soberPatients Know Bestactivities.[employer]/[facility]//[facility] Recovery Connection ([facility])  [facility] connects people seeking recovery to resources that help foster and sustain long-term recovery.  Whether you are seeking resources for treatment, transportation, housing, job training, education, health care or other pathways to recovery, [facility] is a great place to start.    Phone: (667) 969-5141.  www.[place]Mobilligy (Great listing of all types of recovery and non-recovery related resources)    General Medication Instructions:  See your medication sheet(s) for instructions.   Take all medicines as directed.  Make no changes unless your doctor suggests them.   Go to all your doctor visits. Be sure to have all your required lab tests. This way, your medicines can be refilled on time.  Do not use any drugs not prescribed by your provider.  AA/NA and Sponsors are excellent resources for support  Avoid alcohol.    Any follow up concerns:  Nursing questions call the Unit [facility] [phone]  Medical Record call [phone]  Outpatient Behavioral Intake call [phone]  LP+ Wait List/Bed Availability call [phone]    The entire treatment team has appreciated the opportunity to work with you Shahram.  We wish you the best in the future and with your lifelong recovery goals. Please bring this discharge folder with you to all follow up appointments.  It contains your lab results, diagnosis, medication list and discharge recommendations.  THANK YOU FOR CHOOSING THE [facility]

## 2022-04-18 ENCOUNTER — TELEPHONE (OUTPATIENT)
Dept: FAMILY MEDICINE | Facility: CLINIC | Age: 37
End: 2022-04-18
Payer: COMMERCIAL

## 2022-04-18 NOTE — TELEPHONE ENCOUNTER
Patient calling due to upcoming appointment on 4/20/22.    Patient reports he is currently in inpatient treatment.   He was told he has a low HR and they requested he call and request an EKG to be done at his upcoming appointment to rule out any cardiac concerns.     Pt is not symptomatic at this moment.     Will route to provider seeing Pt on 4/20/22 as FYI.     No further questions or concerns at this time.     Sheila Armendariz RN, BSN  [facility]

## 2022-04-20 ENCOUNTER — OFFICE VISIT (OUTPATIENT)
Dept: PHARMACY | Facility: CLINIC | Age: 37
End: 2022-04-20
Payer: COMMERCIAL

## 2022-04-20 VITALS
SYSTOLIC BLOOD PRESSURE: 172 MMHG | DIASTOLIC BLOOD PRESSURE: 123 MMHG | BODY MASS INDEX: 34.56 KG/M2 | WEIGHT: 247.8 LBS | HEART RATE: 62 BPM

## 2022-04-20 DIAGNOSIS — F10.930 ALCOHOL WITHDRAWAL SYNDROME WITHOUT COMPLICATION (H): ICD-10-CM

## 2022-04-20 DIAGNOSIS — F32.9 MAJOR DEPRESSIVE DISORDER WITH CURRENT ACTIVE EPISODE, UNSPECIFIED DEPRESSION EPISODE SEVERITY, UNSPECIFIED WHETHER RECURRENT: Primary | ICD-10-CM

## 2022-04-20 DIAGNOSIS — F10.10 ALCOHOL ABUSE: ICD-10-CM

## 2022-04-20 DIAGNOSIS — I10 HTN, GOAL BELOW 140/90: ICD-10-CM

## 2022-04-20 PROCEDURE — 99605 MTMS BY PHARM NP 15 MIN: CPT | Performed by: PHARMACIST

## 2022-04-20 PROCEDURE — 99607 MTMS BY PHARM ADDL 15 MIN: CPT | Performed by: PHARMACIST

## 2022-04-20 RX ORDER — LISINOPRIL 40 MG/1
40 TABLET ORAL DAILY
Qty: 90 TABLET | Refills: 1 | Status: SHIPPED | OUTPATIENT
Start: 2022-04-20 | End: 2022-06-01

## 2022-04-20 RX ORDER — TRAZODONE HYDROCHLORIDE 50 MG/1
1 TABLET, FILM COATED ORAL AT BEDTIME
COMMUNITY
Start: 2022-04-14 | End: 2022-05-31

## 2022-04-20 RX ORDER — FOLIC ACID 1 MG/1
1 TABLET ORAL DAILY
Qty: 90 TABLET | Refills: 0 | Status: SHIPPED | OUTPATIENT
Start: 2022-04-20 | End: 2022-07-18

## 2022-04-20 RX ORDER — MULTIPLE VITAMINS W/ MINERALS TAB 9MG-400MCG
1 TAB ORAL DAILY
Qty: 90 TABLET | Refills: 0 | Status: SHIPPED | OUTPATIENT
Start: 2022-04-20 | End: 2022-06-03

## 2022-04-20 RX ORDER — LISINOPRIL 20 MG/1
20 TABLET ORAL DAILY
Qty: 90 TABLET | Status: CANCELLED | OUTPATIENT
Start: 2022-04-20

## 2022-04-20 RX ORDER — FLUOXETINE 40 MG/1
80 CAPSULE ORAL DAILY
Qty: 60 CAPSULE | Refills: 3 | Status: SHIPPED | OUTPATIENT
Start: 2022-04-20 | End: 2022-06-01

## 2022-04-20 RX ORDER — LANOLIN ALCOHOL/MO/W.PET/CERES
100 CREAM (GRAM) TOPICAL DAILY
Qty: 90 TABLET | Refills: 0 | Status: SHIPPED | OUTPATIENT
Start: 2022-04-20 | End: 2022-06-06

## 2022-04-20 NOTE — Clinical Note
ROMÁNI - Patient scheduled with you May 4th for follow-up BP check. Someone at some point recommended he get an EKG done for HR in the 50's. His HR was in the 60's today and he has been alcohol free since April 1st. I'm not sure what the normal process is for doing an EKG, or if you think it is indicated, but I put his request in the appointment notes.  Thanks! Leslie

## 2022-04-20 NOTE — PROGRESS NOTES
Medication Therapy Management (MTM) Encounter    ASSESSMENT:                            Medication Adherence/Access: No issues identified    Depression: Patient would benefit from increasing fluoxetine to max dose at this time.    Substance Use Disorder: No changes recommended to medications; needs refills. He would benefit from planning aftercare to maintain sobriety.    Hypertension: Patient is not meeting blood pressure goal of < 140/90mmHg. Will increase lisinopril dose at this time.      PLAN:                            1. We are increasing the fluoxetine to a total of 80mg per day -- take two 40mg tablets once daily to help with mood and anxiety.    2. We are also increasing the lisinopril to 40mg daily to help improve the blood pressure. We are aiming to get your blood pressure under 140/90mmHg.     3. I have sent refills for your multivitamin, thiamine and folic acid for 3 months to the pharmacy.    4. Follow-up with NACHO Caba CNP on Wednesday, May 4th. 12:45pm for a lab (rechecking the potassium) and 1pm with Martha for a blood pressure check and EKG.    Follow-up: Return in about 1 month (around 5/20/2022) for Medication Therapy Management.    SUBJECTIVE/OBJECTIVE:                          Shahram Young is a 36 year old male coming in for an initial visit. He was referred to me from self?      Reason for visit: Medication review.  Just got out of inpatient treatment yesterday    Allergies/ADRs: Reviewed in chart  Past Medical History: Reviewed in chart  Tobacco: He reports that he has been smoking cigarettes. He has a 10.00 pack-year smoking history. He has quit using smokeless tobacco.Tobacco Cessation Action Plan:   Information offered: Patient not interested at this time  Alcohol: history of alcohol dependence and sober as of 4/1/22 - had been drinking 1L whiskey/24 hours; was sober [id number] in 2010, relapsed due to complacency  Other Substance Use: smoked week last night; waiting for medical  cannabis  Caffeine quite a bit right now    Medication Adherence/Access: no issues reported with home administration. Thinks he wasn't getting the right dose of his medications while inpatient rehab.    Depression:    Fluoxetine 60mg QAM  Thinks he was only getting 40mg while inpatient and noticed it did have a negative impact on his mood. Would actually like to increase dose at this time.  Patient reports the following stressors: newly sober  PHQ-9 SCORE 6/17/2013 9/3/2013 1/12/2022   PHQ-9 Total Score 25 21 -   PHQ-9 Total Score - - 27     JALEN-7 SCORE 7/28/2011 1/12/2022   Total Score 21 -   Total Score - 21     Substance Use Disorder:   Methadone - follows with methadone clinic for MAT, reports a hx of heroine use  Gabapentin 300mg 2 capsules three times daily - falls asleep easily, starting to improve a little bit  Folic Acid 1mg QAM  Thiamine 100mg QAM  Multivitamin QAM    Lab Results   Component Value Date    AST 88 04/01/2022    AST 45 03/23/2021     Lab Results   Component Value Date     04/01/2022    ALT 63 03/23/2021     Just left inpatient rehab yesterday  Family is supportive - stayed at dad's house last night and plans to stay there [facility]  Considering outpatient treatment program for aftercare  [facility] has a friend through  that he is considering to ask to be his sponsor    Hypertension:   Lisinopril 10mg 2 tablets QAM    Patient does not self-monitor blood pressure.    Patient reports no current medication side effects. Has been hesitant to take BP medications in the past due to fear of ED.  BP Readings from Last 3 Encounters:   04/20/22 (!) 172/123   04/05/22 134/85   03/17/22 (!) 147/91     Pulse Readings from Last 3 Encounters:   04/20/22 62   04/05/22 55   03/17/22 64       Last Comprehensive Metabolic Panel:  Sodium   Date Value Ref Range Status   04/01/2022 138 133 - 144 mmol/L Final   03/23/2021 136 133 - 144 mmol/L Final     Potassium   Date Value Ref Range  Status   04/02/2022 3.7 3.4 - 5.3 mmol/L Final   03/23/2021 4.5 3.4 - 5.3 mmol/L Final     Chloride   Date Value Ref Range Status   04/01/2022 98 94 - 109 mmol/L Final   03/23/2021 104 94 - 109 mmol/L Final     Carbon Dioxide   Date Value Ref Range Status   03/23/2021 27 20 - 32 mmol/L Final     Carbon Dioxide (CO2)   Date Value Ref Range Status   04/01/2022 33 (H) 20 - 32 mmol/L Final     Anion Gap   Date Value Ref Range Status   04/01/2022 7 3 - 14 mmol/L Final   03/23/2021 5 3 - 14 mmol/L Final     Glucose   Date Value Ref Range Status   04/01/2022 74 70 - 99 mg/dL Final   03/23/2021 83 70 - 99 mg/dL Final     Urea Nitrogen   Date Value Ref Range Status   04/01/2022 13 7 - 30 mg/dL Final   03/23/2021 18 7 - 30 mg/dL Final     Creatinine   Date Value Ref Range Status   04/01/2022 0.66 0.66 - 1.25 mg/dL Final   03/23/2021 0.78 0.66 - 1.25 mg/dL Final     GFR Estimate   Date Value Ref Range Status   04/01/2022 >90 >60 mL/min/1.73m2 Final     Comment:     Effective December 21, 2021 eGFRcr in adults is calculated using the 2021 CKD-EPI creatinine equation which includes age and gender (Riaz mello al., NEJ, DOI: 10.1056/EDEQln6464628)   03/23/2021 >90 >60 mL/min/[1.73_m2] Final     Comment:     Non  GFR Calc  Starting 12/18/2018, serum creatinine based estimated GFR (eGFR) will be   calculated using the Chronic Kidney Disease Epidemiology Collaboration   (CKD-EPI) equation.       Calcium   Date Value Ref Range Status   04/01/2022 9.4 8.5 - 10.1 mg/dL Final   03/23/2021 9.0 8.5 - 10.1 mg/dL Final       Today's Vitals: BP (!) 172/123   Pulse 62   Wt 247 lb 12.8 oz (112.4 kg)   BMI 34.56 kg/m    ----------------  Post Discharge Medication Reconciliation Status: discharge medications reconciled and changed, per note/orders.    I spent 60 minutes with this patient today. All changes were made via collaborative practice agreement with NACHO Caba CNP. A copy of the visit note was provided to the  patient's provider(s).    The patient was given a summary of these recommendations.     Leslie Comer, Anusha.D., [facility]  Medication Therapy Management Pharmacist  [phone]     Medication Therapy Recommendations  HTN, goal below 140/90    Current Medication: lisinopril (ZESTRIL) 10 MG tablet (Discontinued)   Rationale: Dose too low - Dosage too low - Effectiveness   Recommendation: Increase Dose - lisinopril 40 MG tablet   Status: Accepted per CPA         Major depression    Current Medication: FLUoxetine (PROZAC) 20 MG capsule (Discontinued)   Rationale: Dose too low - Dosage too low - Effectiveness   Recommendation: Increase Dose - FLUoxetine 40 MG capsule   Status: Accepted per CPA

## 2022-04-20 NOTE — PATIENT INSTRUCTIONS
Recommendations from today's MTM visit:                                                    MTM (medication therapy management) is a service provided by a clinical pharmacist designed to help you get the most of out of your medicines.   Today we reviewed what your medicines are for, how to know if they are working, that your medicines are safe and how to make your medicine regimen as easy as possible.      1. We are increasing the fluoxetine to a total of 80mg per day -- take two 40mg tablets once daily to help with mood and anxiety.    2. We are also increasing the lisinopril to 40mg daily to help improve the blood pressure. We are aiming to get your blood pressure under 140/90mmHg.    3. I have sent refills for your multivitamin, thiamine and folic acid for 3 months to the pharmacy.    4. Follow-up with NACHO Caba CNP on Wednesday, May 4th. 12:45pm for a lab (rechecking the potassium) and 1pm with Martha for a blood pressure check and EKG.    Follow-up: Return in about 1 month (around 5/20/2022) for Medication Therapy Management.    It was great to speak with you today.  I value your experience and would be very thankful for your time with providing feedback on our clinic survey. You may receive a survey via email or text message in the next few days.     To schedule another MTM appointment, please call the clinic directly or you may call the MTM scheduling line at [phone] or toll-free at [phone].     My Clinical Pharmacist's contact information:                                                      Please feel free to contact me with any questions or concerns you have.      Leslie Comer, Pharm.D., [facility]  Medication Therapy Management Pharmacist  [phone]

## 2022-05-31 DIAGNOSIS — I10 HTN, GOAL BELOW 140/90: ICD-10-CM

## 2022-05-31 DIAGNOSIS — F32.9 MAJOR DEPRESSIVE DISORDER WITH CURRENT ACTIVE EPISODE, UNSPECIFIED DEPRESSION EPISODE SEVERITY, UNSPECIFIED WHETHER RECURRENT: ICD-10-CM

## 2022-05-31 DIAGNOSIS — F10.29 ALCOHOL DEPENDENCE WITH UNSPECIFIED ALCOHOL-INDUCED DISORDER (H): ICD-10-CM

## 2022-05-31 NOTE — TELEPHONE ENCOUNTER
Patient calling for a refill on Gabapentin, Lisinopril, Prozac and Trazodone to be sent to [facility] pharmacy.  Haleigh Lanier [facility]  2nd Floor  Primary Care

## 2022-06-01 DIAGNOSIS — F10.930 ALCOHOL WITHDRAWAL SYNDROME WITHOUT COMPLICATION (H): ICD-10-CM

## 2022-06-01 RX ORDER — FLUOXETINE 40 MG/1
80 CAPSULE ORAL DAILY
Qty: 60 CAPSULE | Refills: 5 | Status: SHIPPED | OUTPATIENT
Start: 2022-06-01 | End: 2022-07-25

## 2022-06-01 RX ORDER — LISINOPRIL 40 MG/1
40 TABLET ORAL DAILY
Qty: 90 TABLET | Refills: 1 | Status: SHIPPED | OUTPATIENT
Start: 2022-06-01 | End: 2022-07-25

## 2022-06-01 RX ORDER — TRAZODONE HYDROCHLORIDE 50 MG/1
50 TABLET, FILM COATED ORAL AT BEDTIME
Qty: 30 TABLET | Refills: 2 | Status: SHIPPED | OUTPATIENT
Start: 2022-06-01 | End: 2022-07-21

## 2022-06-01 RX ORDER — GABAPENTIN 300 MG/1
600 CAPSULE ORAL 3 TIMES DAILY
Qty: 120 CAPSULE | Refills: 2 | Status: SHIPPED | OUTPATIENT
Start: 2022-06-01 | End: 2022-07-25

## 2022-06-03 RX ORDER — ASPIRIN 81 MG
TABLET, DELAYED RELEASE (ENTERIC COATED) ORAL
Qty: 90 TABLET | Refills: 1 | Status: SHIPPED | OUTPATIENT
Start: 2022-06-03 | End: 2022-07-21

## 2022-06-06 RX ORDER — LANOLIN ALCOHOL/MO/W.PET/CERES
CREAM (GRAM) TOPICAL
Qty: 90 TABLET | Refills: 0 | Status: SHIPPED | OUTPATIENT
Start: 2022-06-06 | End: 2022-07-21

## 2022-06-06 NOTE — TELEPHONE ENCOUNTER
LVM for patient to call back and schedule an office visit for medication refills with primary provider, per provider patient is due for OV/EKG.  .Sima Lu Patient Registration

## 2022-06-16 ENCOUNTER — TELEPHONE (OUTPATIENT)
Dept: BEHAVIORAL HEALTH | Facility: CLINIC | Age: 37
End: 2022-06-16
Payer: COMMERCIAL

## 2022-06-16 NOTE — TELEPHONE ENCOUNTER
[facility] received alert for patient who is currently admitted to [facility]. [facility] confirmed there is a general consent on file, and reached out to [place] to try connecting with a . [facility] left a message with the  department requesting a call back from the /discharge planner for patient, so [facility] can receive more information on patient's current status and if there's a plan in place yet. [facility] will offer [facility] services and explain their role to . [facility] also reviewed recent notes from admission, and patient is going through alcohol withdrawals and being treatment for his medical concerns. Waiting for call back from [place] .     [facility] received call back from Andriy, a  at [employer], and she reported patient is still withdrawing. She reported they are planning on asking him about treatment, but they don't have any other discharge plans yet and no time frame for patient. She also reported he may choose to leave once he's more stable. [facility] explained the [facility] program and reported [facility] will try to follow his chart, and offer services once he is close to discharge or is discharged. [facility] can reach Andriy, or her coworker, Thalia, who is the typical  working with patient, at the same number [phone]. [facility] thanked her for her assistance.     Allyson Hines, ANASTASIYA  [facility]   [facility]  [phone]

## 2022-07-21 ENCOUNTER — TELEPHONE (OUTPATIENT)
Dept: FAMILY MEDICINE | Facility: CLINIC | Age: 37
End: 2022-07-21

## 2022-07-21 DIAGNOSIS — I10 HTN, GOAL BELOW 140/90: ICD-10-CM

## 2022-07-21 DIAGNOSIS — F10.10 ALCOHOL ABUSE: ICD-10-CM

## 2022-07-21 DIAGNOSIS — F10.29 ALCOHOL DEPENDENCE WITH UNSPECIFIED ALCOHOL-INDUCED DISORDER (H): ICD-10-CM

## 2022-07-21 DIAGNOSIS — F10.930 ALCOHOL WITHDRAWAL SYNDROME WITHOUT COMPLICATION (H): ICD-10-CM

## 2022-07-21 DIAGNOSIS — F32.9 MAJOR DEPRESSIVE DISORDER WITH CURRENT ACTIVE EPISODE, UNSPECIFIED DEPRESSION EPISODE SEVERITY, UNSPECIFIED WHETHER RECURRENT: ICD-10-CM

## 2022-07-21 RX ORDER — TRAZODONE HYDROCHLORIDE 50 MG/1
50 TABLET, FILM COATED ORAL AT BEDTIME
Qty: 30 TABLET | Refills: 2 | Status: SHIPPED | OUTPATIENT
Start: 2022-07-21 | End: 2022-07-25

## 2022-07-21 RX ORDER — FOLIC ACID 1 MG/1
1000 TABLET ORAL DAILY
Qty: 90 TABLET | Refills: 0 | Status: SHIPPED | OUTPATIENT
Start: 2022-07-21 | End: 2023-06-28

## 2022-07-21 RX ORDER — MULTIPLE VITAMINS W/ MINERALS TAB 9MG-400MCG
1 TAB ORAL DAILY
Qty: 90 TABLET | Refills: 1 | Status: SHIPPED | OUTPATIENT
Start: 2022-07-21 | End: 2023-06-28

## 2022-07-21 NOTE — TELEPHONE ENCOUNTER
Multivitamin, folic acid, and trazodone:   Prescription approved per [facility] Refill Protocol.    Fluoxetine, lisinopril, thiamine, gabapentin:   Routing refill request to provider for review/approval because:  Drug not on the [facility] refill protocol   Labs out of range:  BP and PHQ9    Rosario Canada RN  [facility]
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
## 2022-07-21 NOTE — LETTER
August 1, 2022          Shahram Yuong  [address] [place] [id number]            Dear Shahram Young,      At [facility] we care about your health and are committed to providing quality patient care. Regular appointments are a vital part of the care and management of your health and can help prevent many of the complications that can occur.      It has come to our attention that you are due for a medication check.  Please call [facility] at [phone] soon to schedule your follow up appointment.    If you have transferred care to another clinic please call to inform us so that we do not continue to send you reminder letters.      Sincerely,      [facility] Care Team

## 2022-07-25 RX ORDER — GABAPENTIN 300 MG/1
600 CAPSULE ORAL 3 TIMES DAILY
Qty: 120 CAPSULE | Refills: 0 | Status: SHIPPED | OUTPATIENT
Start: 2022-07-25 | End: 2023-06-28

## 2022-07-25 RX ORDER — FLUOXETINE 40 MG/1
80 CAPSULE ORAL DAILY
Qty: 60 CAPSULE | Refills: 0 | Status: SHIPPED | OUTPATIENT
Start: 2022-07-25 | End: 2022-11-03

## 2022-07-25 RX ORDER — LANOLIN ALCOHOL/MO/W.PET/CERES
100 CREAM (GRAM) TOPICAL DAILY
Qty: 90 TABLET | Refills: 0 | Status: SHIPPED | OUTPATIENT
Start: 2022-07-25 | End: 2023-06-28

## 2022-07-25 RX ORDER — LISINOPRIL 40 MG/1
40 TABLET ORAL DAILY
Qty: 90 TABLET | Refills: 0 | Status: SHIPPED | OUTPATIENT
Start: 2022-07-25 | End: 2022-11-03

## 2022-07-25 NOTE — TELEPHONE ENCOUNTER
Attempted number on file twice and get a busy signal if patient calls back please assist with scheduling a office visit with provider for further refills

## 2022-08-30 ENCOUNTER — TELEPHONE (OUTPATIENT)
Dept: FAMILY MEDICINE | Facility: CLINIC | Age: 37
End: 2022-08-30
Payer: COMMERCIAL

## 2022-08-30 NOTE — TELEPHONE ENCOUNTER
Patient Quality Outreach    Patient is due for the following:   Hypertension -  BP check    Next Steps:   Schedule a nurse only visit for blood pressure check    Type of outreach:    Sent [employer] message.      Questions for provider review:    None     Diane L. Schoenherr, RN

## 2022-11-03 ENCOUNTER — ANCILLARY PROCEDURE (OUTPATIENT)
Dept: GENERAL RADIOLOGY | Facility: CLINIC | Age: 37
End: 2022-11-03
Attending: PHYSICIAN ASSISTANT
Payer: COMMERCIAL

## 2022-11-03 ENCOUNTER — OFFICE VISIT (OUTPATIENT)
Dept: FAMILY MEDICINE | Facility: CLINIC | Age: 37
End: 2022-11-03
Payer: COMMERCIAL

## 2022-11-03 VITALS
RESPIRATION RATE: 20 BRPM | WEIGHT: 234.8 LBS | SYSTOLIC BLOOD PRESSURE: 146 MMHG | HEART RATE: 71 BPM | DIASTOLIC BLOOD PRESSURE: 122 MMHG | BODY MASS INDEX: 32.75 KG/M2 | OXYGEN SATURATION: 97 % | TEMPERATURE: 97.9 F

## 2022-11-03 DIAGNOSIS — M79.662 PAIN OF LEFT LOWER LEG: ICD-10-CM

## 2022-11-03 DIAGNOSIS — I10 HTN, GOAL BELOW 140/90: ICD-10-CM

## 2022-11-03 DIAGNOSIS — F51.01 PRIMARY INSOMNIA: ICD-10-CM

## 2022-11-03 DIAGNOSIS — F32.2 CURRENT SEVERE EPISODE OF MAJOR DEPRESSIVE DISORDER WITHOUT PSYCHOTIC FEATURES, UNSPECIFIED WHETHER RECURRENT (H): Primary | ICD-10-CM

## 2022-11-03 DIAGNOSIS — F43.10 PTSD (POST-TRAUMATIC STRESS DISORDER): ICD-10-CM

## 2022-11-03 DIAGNOSIS — R45.86 VARIABLE MOOD: ICD-10-CM

## 2022-11-03 PROCEDURE — 73590 X-RAY EXAM OF LOWER LEG: CPT | Mod: TC | Performed by: RADIOLOGY

## 2022-11-03 PROCEDURE — 99214 OFFICE O/P EST MOD 30 MIN: CPT | Performed by: PHYSICIAN ASSISTANT

## 2022-11-03 PROCEDURE — 72100 X-RAY EXAM L-S SPINE 2/3 VWS: CPT | Mod: TC | Performed by: RADIOLOGY

## 2022-11-03 RX ORDER — DESVENLAFAXINE 50 MG/1
50 TABLET, FILM COATED, EXTENDED RELEASE ORAL DAILY
Qty: 30 TABLET | Refills: 0 | Status: SHIPPED | OUTPATIENT
Start: 2022-11-03 | End: 2022-12-04

## 2022-11-03 RX ORDER — DICLOFENAC SODIUM 75 MG/1
75 TABLET, DELAYED RELEASE ORAL 2 TIMES DAILY
Qty: 30 TABLET | Refills: 0 | Status: SHIPPED | OUTPATIENT
Start: 2022-11-03 | End: 2022-12-15

## 2022-11-03 RX ORDER — AMLODIPINE AND BENAZEPRIL HYDROCHLORIDE 5; 20 MG/1; MG/1
1 CAPSULE ORAL DAILY
Qty: 90 CAPSULE | Refills: 1 | Status: SHIPPED | OUTPATIENT
Start: 2022-11-03 | End: 2023-06-28

## 2022-11-03 RX ORDER — TRAZODONE HYDROCHLORIDE 50 MG/1
50 TABLET, FILM COATED ORAL AT BEDTIME
Qty: 30 TABLET | Refills: 0 | Status: SHIPPED | OUTPATIENT
Start: 2022-11-03 | End: 2022-11-29

## 2022-11-03 RX ORDER — LURASIDONE HYDROCHLORIDE 20 MG/1
20 TABLET, FILM COATED ORAL DAILY
Qty: 30 TABLET | Refills: 0 | Status: SHIPPED | OUTPATIENT
Start: 2022-11-03 | End: 2023-06-28

## 2022-11-03 ASSESSMENT — PATIENT HEALTH QUESTIONNAIRE - PHQ9
SUM OF ALL RESPONSES TO PHQ QUESTIONS 1-9: 25
SUM OF ALL RESPONSES TO PHQ QUESTIONS 1-9: 25
10. IF YOU CHECKED OFF ANY PROBLEMS, HOW DIFFICULT HAVE THESE PROBLEMS MADE IT FOR YOU TO DO YOUR WORK, TAKE CARE OF THINGS AT HOME, OR GET ALONG WITH OTHER PEOPLE: EXTREMELY DIFFICULT

## 2022-11-03 ASSESSMENT — ENCOUNTER SYMPTOMS: LEG PAIN: 1

## 2022-11-03 ASSESSMENT — PAIN SCALES - GENERAL: PAINLEVEL: SEVERE PAIN (7)

## 2022-11-03 NOTE — PROGRESS NOTES
Subjective   Shahram is a 37 year old, presenting for the following health issues:  Leg Pain (Left leg pain since knee surgery since knee surgery (10 yrs), worse x 2-3 mos), Health Maintenance (Patient declined pneumonia and influenza vaccines), and Mental Health Problem (Recheck medications)      Leg Pain    History of Present Illness       Reason for visit:  Leg pain and med checkup    He eats 2-3 servings of fruits and vegetables daily.He consumes 2 sweetened beverage(s) daily.He exercises with enough effort to increase his heart rate 20 to 29 minutes per day.  He exercises with enough effort to increase his heart rate 6 days per week. He is missing 3 dose(s) of medications per week.    Today's PHQ-9         PHQ-9 Total Score: 25    PHQ-9 Q9 Thoughts of better off dead/self-harm past 2 weeks :   Several days  Thoughts of suicide or self harm: (P) Yes  Self-harm Plan:   (P) No  Self-harm Action:     (P) No  Safety concerns for self or others: (P) No    How difficult have these problems made it for you to do your work, take care of things at home, or get along with other people: Extremely difficult     Left leg/calf pain the past 6 mos. No redness or swelling. Posterior and anterior leg pain. Pain with weight bearing activity. Some low back pain. No pain with palpation. No paresthesias     History of htn: has been off of lisinopril for the past week.     Methadone currently takes 94 mg daily.  Notes variability of moods. Some depression and anxiety. Has been trying to see psych.   Recent history of paranoia. Patient feels like it could be due to his fluoxetine. Has stopped all meds since his eval at the ED 10 days ago.  History of depression/anxiety and ptsd.  Review of Systems   Constitutional, HEENT, cardiovascular, pulmonary, GI, , musculoskeletal, neuro, skin, endocrine and psych systems are negative, except as otherwise noted.      Objective    BP (!) 146/122   Pulse 71   Temp 97.9  F (36.6  C)  (Tympanic)   Resp 20   Wt 106.5 kg (234 lb 12.8 oz)   SpO2 97%   BMI 32.75 kg/m    Body mass index is 32.75 kg/m .  Physical Exam   Eye exam - right eye normal lid, conjunctiva, cornea, pupil and fundus, left eye normal lid, conjunctiva, cornea, pupil and fundus.  Thyroid not palpable, not enlarged, no nodules detected.  CHEST:chest clear to IPPA, no tachypnea, retractions or cyanosis and S1, S2 normal, no murmur, no gallop, rate regular.  BACK: Lumbosacral spine area reveals local tenderness.  Painful and reduced LS ROM noted. Straight leg raise is neg .  DTR's, motor strength and sensation normal, including heel and toe gait.  Perifpheral pulses are palpable.  Hipes and knees have full range of motion without pain.  No abdominal tenderness, mass or organomegaly.  No leg swelling or edema. Some anterior shin tenderness. Pulses normal. Knee and ankle rom normal with out pain.     Shahram was seen today for leg pain, health maintenance and mental health problem.    Diagnoses and all orders for this visit:    Current severe episode of major depressive disorder without psychotic features, unspecified whether recurrent (H)  -     Adult Mental Health  Referral; Future  -     desvenlafaxine (PRISTIQ) 50 MG 24 hr tablet; Take 1 tablet (50 mg) by mouth daily    Variable mood  -     Adult Mental Health  Referral; Future  -     lurasidone (LATUDA) 20 MG TABS tablet; Take 1 tablet (20 mg) by mouth daily    PTSD (post-traumatic stress disorder)  -     desvenlafaxine (PRISTIQ) 50 MG 24 hr tablet; Take 1 tablet (50 mg) by mouth daily    HTN, goal below 140/90  -     amLODIPine-benazepril (LOTREL) 5-20 MG capsule; Take 1 capsule by mouth daily    Primary insomnia  -     traZODone (DESYREL) 50 MG tablet; Take 1 tablet (50 mg) by mouth At Bedtime    Pain of left lower leg  -     XR Lumbar Spine 2/3 Views; Future  -     XR Tibia and Fibula Left 2 Views; Future  -     diclofenac (VOLTAREN) 75 MG EC tablet; Take 1  tablet (75 mg) by mouth 2 times daily    Other orders  -     REVIEW OF HEALTH MAINTENANCE PROTOCOL ORDERS    start prestiq and lurasidone  Continue off of prozac and lisinopril. Start lotrel for his blood pressure  Recheck in 1 mos

## 2022-11-17 ENCOUNTER — TELEPHONE (OUTPATIENT)
Dept: PHARMACY | Facility: CLINIC | Age: 37
End: 2022-11-17

## 2022-11-17 NOTE — TELEPHONE ENCOUNTER
We have been unable to reach this patient for MTM follow-up after several attempts. We will stop reaching out to the patient at this time. Please let us know if we can assist in this patient's care in the future.    Routing to PCP as RAYMOND Comer, Pharm.D., BCACP  Medication Therapy Management Pharmacist  [phone]

## 2022-12-13 DIAGNOSIS — M79.662 PAIN OF LEFT LOWER LEG: ICD-10-CM

## 2022-12-15 RX ORDER — DICLOFENAC SODIUM 75 MG/1
TABLET, DELAYED RELEASE ORAL
Qty: 30 TABLET | Refills: 0 | Status: SHIPPED | OUTPATIENT
Start: 2022-12-15 | End: 2023-06-28

## 2022-12-15 NOTE — TELEPHONE ENCOUNTER
sidney is due for a visit with me. Schedule him for a virtual (video or phone based on patient preference. Always promote a video visit first) visit with me.

## 2022-12-15 NOTE — TELEPHONE ENCOUNTER
Brendan says he's out of this med:  lurasidone (LATUDA) 20 MG TABS tablet    He would like a refill sent to the [facility] now that he has an appt booked with Severo.    Next Appt  With Family Practice (Severo Armendariz PA-C)  12/19/2022 at 5:00 PM    Sariah Coreas on 12/15/2022 at 10:10 AM

## 2022-12-15 NOTE — TELEPHONE ENCOUNTER
Refill was already sent in for patient. Called and informed patient of this.     Rita Castorena RN

## 2023-01-25 ENCOUNTER — TELEPHONE (OUTPATIENT)
Dept: FAMILY MEDICINE | Facility: CLINIC | Age: 38
End: 2023-01-25

## 2023-01-25 NOTE — TELEPHONE ENCOUNTER
Patient Quality Outreach    Patient is due for the following:   Depression  -  PHQ-9 needed  Physical Preventive Adult Physical      Topic Date Due     COVID-19 Vaccine (1) Never done     Pneumococcal Vaccine (1 - PCV) Never done     Flu Vaccine (1) Never done       Next Steps:   Schedule a Adult Preventative    Type of outreach:    Sent [employer] message.      Questions for provider review:         Claudia Fernando

## 2023-02-28 ENCOUNTER — TELEPHONE (OUTPATIENT)
Dept: FAMILY MEDICINE | Facility: CLINIC | Age: 38
End: 2023-02-28
Payer: COMMERCIAL

## 2023-02-28 NOTE — TELEPHONE ENCOUNTER
Patient Quality Outreach    Patient is due for the following:   Depression  -  PHQ-9 needed  Physical Preventive Adult Physical      Topic Date Due     COVID-19 Vaccine (1) Never done     Pneumococcal Vaccine (1 - PCV) Never done     Flu Vaccine (1) Never done       Next Steps:   Schedule a Adult Preventative    Type of outreach:    Sent letter.    Next Steps:  Reach out within 90 days via Phone.    Max number of attempts reached: No. Will try again in 90 days if patient still on fail list.    Questions for provider review:    None     Claudia Fernando

## 2023-02-28 NOTE — LETTER
February 28, 2023      Shahram Young  [address] [address]        Dear Shahram,     In order to ensure we are providing the best quality care, we have reviewed your chart and see that you are due for the following.    1. Your next office visit is due for an annual physical exam  2. Vaccine update - Influenza, Pneumonia, COVID  3. PHQ-9 questionnaire, attached, complete and return    We greatly appreciate the opportunity to serve you.  Thank you for trusting us with your health care.    Sincerely,    [facility] [place] Team

## 2023-03-19 PROBLEM — K70.10 ALCOHOLIC HEPATITIS WITHOUT ASCITES (H): Status: ACTIVE | Noted: 2023-03-13

## 2023-03-19 PROBLEM — F12.20 CANNABIS DEPENDENCE (H): Status: ACTIVE | Noted: 2022-06-14

## 2023-03-19 PROBLEM — F19.10 POLYSUBSTANCE ABUSE (H): Chronic | Status: ACTIVE | Noted: 2022-03-31

## 2023-03-19 PROBLEM — E66.01 MORBID OBESITY (H): Chronic | Status: ACTIVE | Noted: 2021-03-23

## 2023-03-19 PROBLEM — F10.229 ALCOHOL DEPENDENCE WITH INTOXICATION WITH COMPLICATION (H): Chronic | Status: ACTIVE | Noted: 2022-03-31

## 2023-03-19 PROBLEM — F17.200 TOBACCO USE DISORDER: Status: ACTIVE | Noted: 2023-03-13

## 2023-03-19 PROBLEM — K70.10 ALCOHOLIC HEPATITIS WITHOUT ASCITES (H): Chronic | Status: ACTIVE | Noted: 2023-03-13

## 2023-06-23 ENCOUNTER — TELEPHONE (OUTPATIENT)
Dept: PSYCHIATRY | Facility: CLINIC | Age: 38
End: 2023-06-23
Payer: COMMERCIAL

## 2023-06-23 NOTE — TELEPHONE ENCOUNTER
PSYCHIATRY CLINIC PHONE INTAKE     SERVICES REQUESTED / INTERESTED IN          Med Management    Presenting Problem and Brief History                              What would you like to be seen for? (brief description):  Pt was diagnosed over 10 years ago. He's not currently taking any meds. The last time he took medications was in 2022. He stopped taking them due to side effects. He's experiencing a lot of depression, and it hard to go anywhere. He has difficulty with sleep and appetite. It goes up and down.     Have you received a mental health diagnosis? Yes   Which one (s): PTSD, Depression, and anxiety  Is there any history of developmental delay?  No   Are you currently seeing a mental health provider?  No            Who / month last seen:  NA  Do you have mental health records elsewhere?  Yes CD tx  Will you sign a release so we can obtain them?  Yes    Have you ever been hospitalized for psychiatric reasons?  Yes Describe:  In 2011 or 2012 at [facility] for depression    Do you have current thoughts of self-harm?  No    Do you currently have thoughts of harming others?  No    Do you have any safety concerns? No   If yes to these, offer to reach out to a  for follow up.      Substance Use History     Do you have any history of alcohol / illicit drug use?  Yes  Describe:  Alcohol and heroine. Pt has been sober from heroine for 5 years and from alcohol for a couple of months  Have you ever received treatment for this?  Yes    Describe:  [facility]     Social History     Who is the patient's a guardian?  No    Name / number: NA  Have you had an ACT team in last 12 months?  No  Describe: NA   OK to leave a detailed voicemail?  Yes    Would you be interested in learning more about research opportunities for which you or your child may qualify? We can connect you with a team member for more information.  Yes  If yes, send an Inktd message to Park Soriano    Medical/ Surgical History                                    Patient Active Problem List   Diagnosis     Anxiety disorder     PTSD (post-traumatic stress disorder)     CARDIOVASCULAR SCREENING; LDL GOAL LESS THAN 160     Mild major depression (H)     Vitamin D deficiency     Other and unspecified alcohol dependence, unspecified drinking behavior     Chronic abdominal pain     HTN, goal below 140/90     Alcohol withdrawal syndrome without complication (H)     Opioid dependence in remission (H)     Morbid obesity (H)     Alcohol dependence with unspecified alcohol-induced disorder (H)     Alcohol withdrawal (H)     Polysubstance abuse (H)     Alcohol dependence with intoxication with complication (H)     Alcoholic hepatitis without ascites     Cannabis dependence (H)     Tobacco use disorder          Medications             Have you taken >3 psychiatric medications in your past?  Yes  Do you currently take 5 or more medications, including prescriptions, supplements, and other over the counter products?  No    If YES to at least one of these questions:   As part of your evaluation in our clinic, we have specially trained pharmacists as part of your care team. Your provider would like for you to meet with one of our pharmacists to review your current and past medications, ensure your med list is up to date, and queue up any questions or concerns you have about medications. They will review all of your medications, not just for mental health, to help ensure you know what you re taking and that everything is working together.     Please schedule patient in [facility] (Natasha Jarrett or Syl Mcgarry) for 60m MTM in any green space as virtual (video), telephone, or in person (designated in person days per Epic templates).  -Appt notes can say  Psych eval on xx/xx   -Route telephone encounter to the pharmacist who will be seeing the patient.  If patient has questions about insurance coverage or billing, please still schedule the visit and  refer them to call the [facility] coordinators at [phone].    Current Outpatient Medications   Medication Sig Dispense Refill     amLODIPine-benazepril (LOTREL) 5-20 MG capsule Take 1 capsule by mouth daily 90 capsule 1     desvenlafaxine (PRISTIQ) 50 MG 24 hr tablet TAKE 1 TABLET BY MOUTH EVERY DAY 30 tablet 0     diclofenac (VOLTAREN) 75 MG EC tablet TAKE 1 TABLET BY MOUTH TWICE A DAY 30 tablet 0     folic acid (FOLVITE) 1 MG tablet Take 1 tablet (1,000 mcg) by mouth daily (Patient not taking: Reported on 11/3/2022) 90 tablet 0     gabapentin (NEURONTIN) 300 MG capsule Take 2 capsules (600 mg) by mouth 3 times daily (Patient not taking: Reported on 11/3/2022) 120 capsule 0     lurasidone (LATUDA) 20 MG TABS tablet Take 1 tablet (20 mg) by mouth daily 30 tablet 0     methadone (DOLPHINE) 5 MG/5ML solution Take 125 mg by mouth daily  (Patient not taking: Reported on 11/3/2022)       multivitamin w/minerals (MULTIVITAMIN, THERAPEUTIC WITH MINERALS) tablet Take 1 tablet by mouth daily (Patient not taking: Reported on 11/3/2022) 90 tablet 1     thiamine (B-1) 100 MG tablet Take 1 tablet (100 mg) by mouth daily (Patient not taking: Reported on 11/3/2022) 90 tablet 0     traZODone (DESYREL) 50 MG tablet TAKE 1 TABLET BY MOUTH EVERYDAY AT BEDTIME 90 tablet 1         DISPOSITION      6/23/23 Intake complete. Scheduled on 8/2/23 at 9:00am with Alan Sanchez.     Sarah Reza Sr., Lead

## 2023-06-27 ASSESSMENT — PATIENT HEALTH QUESTIONNAIRE - PHQ9
SUM OF ALL RESPONSES TO PHQ QUESTIONS 1-9: 25
10. IF YOU CHECKED OFF ANY PROBLEMS, HOW DIFFICULT HAVE THESE PROBLEMS MADE IT FOR YOU TO DO YOUR WORK, TAKE CARE OF THINGS AT HOME, OR GET ALONG WITH OTHER PEOPLE: EXTREMELY DIFFICULT
SUM OF ALL RESPONSES TO PHQ QUESTIONS 1-9: 25

## 2023-06-27 ASSESSMENT — ENCOUNTER SYMPTOMS
NERVOUS/ANXIOUS: 1
FREQUENCY: 1
HEARTBURN: 0
PARESTHESIAS: 1
SORE THROAT: 0
SHORTNESS OF BREATH: 0
JOINT SWELLING: 1
COUGH: 1
FEVER: 0
HEADACHES: 1
EYE PAIN: 0
CHILLS: 0
MYALGIAS: 1
DIARRHEA: 1
PALPITATIONS: 1
DYSURIA: 0
WEAKNESS: 1
DIZZINESS: 1
ABDOMINAL PAIN: 1
CONSTIPATION: 1
ARTHRALGIAS: 1
HEMATOCHEZIA: 1
HEMATURIA: 0
NAUSEA: 1

## 2023-06-28 ENCOUNTER — OFFICE VISIT (OUTPATIENT)
Dept: FAMILY MEDICINE | Facility: CLINIC | Age: 38
End: 2023-06-28
Payer: COMMERCIAL

## 2023-06-28 ENCOUNTER — ANCILLARY PROCEDURE (OUTPATIENT)
Dept: GENERAL RADIOLOGY | Facility: CLINIC | Age: 38
End: 2023-06-28
Attending: PHYSICIAN ASSISTANT
Payer: COMMERCIAL

## 2023-06-28 VITALS
TEMPERATURE: 97.4 F | WEIGHT: 294.6 LBS | HEART RATE: 80 BPM | OXYGEN SATURATION: 97 % | DIASTOLIC BLOOD PRESSURE: 100 MMHG | RESPIRATION RATE: 20 BRPM | SYSTOLIC BLOOD PRESSURE: 156 MMHG | BODY MASS INDEX: 42.18 KG/M2 | HEIGHT: 70 IN

## 2023-06-28 DIAGNOSIS — M25.562 CHRONIC PAIN OF BOTH KNEES: ICD-10-CM

## 2023-06-28 DIAGNOSIS — M25.561 CHRONIC PAIN OF BOTH KNEES: ICD-10-CM

## 2023-06-28 DIAGNOSIS — Z11.4 SCREENING FOR HIV (HUMAN IMMUNODEFICIENCY VIRUS): ICD-10-CM

## 2023-06-28 DIAGNOSIS — G89.29 OTHER CHRONIC PAIN: ICD-10-CM

## 2023-06-28 DIAGNOSIS — G89.29 CHRONIC PAIN OF BOTH KNEES: ICD-10-CM

## 2023-06-28 DIAGNOSIS — M54.50 CHRONIC MIDLINE LOW BACK PAIN WITHOUT SCIATICA: ICD-10-CM

## 2023-06-28 DIAGNOSIS — R73.9 HYPERGLYCEMIA: ICD-10-CM

## 2023-06-28 DIAGNOSIS — G89.29 CHRONIC MIDLINE LOW BACK PAIN WITHOUT SCIATICA: ICD-10-CM

## 2023-06-28 DIAGNOSIS — R06.83 SNORING: ICD-10-CM

## 2023-06-28 DIAGNOSIS — I10 BENIGN ESSENTIAL HYPERTENSION: ICD-10-CM

## 2023-06-28 DIAGNOSIS — F10.10 ALCOHOL CONSUMPTION BINGE DRINKING: ICD-10-CM

## 2023-06-28 DIAGNOSIS — Z00.00 ROUTINE GENERAL MEDICAL EXAMINATION AT A HEALTH CARE FACILITY: Primary | ICD-10-CM

## 2023-06-28 DIAGNOSIS — R35.0 URINARY FREQUENCY: ICD-10-CM

## 2023-06-28 DIAGNOSIS — F11.11 HISTORY OF OPIOID ABUSE (H): ICD-10-CM

## 2023-06-28 LAB
ALBUMIN UR-MCNC: NEGATIVE MG/DL
APPEARANCE UR: CLEAR
BILIRUB UR QL STRIP: NEGATIVE
COLOR UR AUTO: YELLOW
GLUCOSE UR STRIP-MCNC: NEGATIVE MG/DL
HBA1C MFR BLD: 5.7 % (ref 0–5.6)
HGB UR QL STRIP: ABNORMAL
KETONES UR STRIP-MCNC: NEGATIVE MG/DL
LEUKOCYTE ESTERASE UR QL STRIP: NEGATIVE
NITRATE UR QL: NEGATIVE
PH UR STRIP: 6 [PH] (ref 5–7)
RBC #/AREA URNS AUTO: NORMAL /HPF
SP GR UR STRIP: >=1.03 (ref 1–1.03)
UROBILINOGEN UR STRIP-ACNC: 0.2 E.U./DL
WBC #/AREA URNS AUTO: NORMAL /HPF

## 2023-06-28 PROCEDURE — 73560 X-RAY EXAM OF KNEE 1 OR 2: CPT | Mod: TC | Performed by: RADIOLOGY

## 2023-06-28 PROCEDURE — 81001 URINALYSIS AUTO W/SCOPE: CPT | Performed by: PHYSICIAN ASSISTANT

## 2023-06-28 PROCEDURE — 99395 PREV VISIT EST AGE 18-39: CPT | Performed by: PHYSICIAN ASSISTANT

## 2023-06-28 PROCEDURE — G0103 PSA SCREENING: HCPCS | Performed by: PHYSICIAN ASSISTANT

## 2023-06-28 PROCEDURE — 99214 OFFICE O/P EST MOD 30 MIN: CPT | Mod: 25 | Performed by: PHYSICIAN ASSISTANT

## 2023-06-28 PROCEDURE — 36415 COLL VENOUS BLD VENIPUNCTURE: CPT | Performed by: PHYSICIAN ASSISTANT

## 2023-06-28 PROCEDURE — 83036 HEMOGLOBIN GLYCOSYLATED A1C: CPT | Performed by: PHYSICIAN ASSISTANT

## 2023-06-28 RX ORDER — ACAMPROSATE CALCIUM 333 MG/1
666 TABLET, DELAYED RELEASE ORAL 3 TIMES DAILY
Qty: 180 TABLET | Refills: 3 | Status: SHIPPED | OUTPATIENT
Start: 2023-06-28 | End: 2023-07-20

## 2023-06-28 RX ORDER — MELOXICAM 15 MG/1
15 TABLET ORAL DAILY
Qty: 60 TABLET | Refills: 1 | Status: SHIPPED | OUTPATIENT
Start: 2023-06-28 | End: 2023-08-23

## 2023-06-28 RX ORDER — PREGABALIN 25 MG/1
25 CAPSULE ORAL 2 TIMES DAILY
Qty: 60 CAPSULE | Refills: 1 | Status: SHIPPED | OUTPATIENT
Start: 2023-06-28 | End: 2023-08-23

## 2023-06-28 RX ORDER — AMLODIPINE AND BENAZEPRIL HYDROCHLORIDE 5; 10 MG/1; MG/1
1 CAPSULE ORAL DAILY
Qty: 45 CAPSULE | Refills: 0 | Status: SHIPPED | OUTPATIENT
Start: 2023-06-28 | End: 2023-08-03

## 2023-06-28 ASSESSMENT — ENCOUNTER SYMPTOMS
PALPITATIONS: 1
PARESTHESIAS: 1
MYALGIAS: 1
CHILLS: 0
HEMATOCHEZIA: 1
FEVER: 0
NERVOUS/ANXIOUS: 1
DIARRHEA: 1
HEMATURIA: 0
ABDOMINAL PAIN: 1
DYSURIA: 0
FREQUENCY: 1
HEARTBURN: 0
NAUSEA: 1
DIZZINESS: 1
COUGH: 1
EYE PAIN: 0
WEAKNESS: 1
JOINT SWELLING: 1
CONSTIPATION: 1
SHORTNESS OF BREATH: 0
SORE THROAT: 0
HEADACHES: 1
ARTHRALGIAS: 1

## 2023-06-28 ASSESSMENT — PAIN SCALES - GENERAL: PAINLEVEL: NO PAIN (0)
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
## 2023-06-29 LAB — PSA SERPL DL<=0.01 NG/ML-MCNC: 0.19 NG/ML

## 2023-07-07 ENCOUNTER — TELEPHONE (OUTPATIENT)
Dept: BEHAVIORAL HEALTH | Facility: CLINIC | Age: 38
End: 2023-07-07
Payer: COMMERCIAL
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
## 2023-08-02 ENCOUNTER — VIRTUAL VISIT (OUTPATIENT)
Dept: PSYCHIATRY | Facility: CLINIC | Age: 38
End: 2023-08-02
Attending: PSYCHIATRY & NEUROLOGY
Payer: COMMERCIAL

## 2023-08-02 DIAGNOSIS — F41.9 ANXIETY DISORDER, UNSPECIFIED TYPE: Primary | Chronic | ICD-10-CM

## 2023-08-02 DIAGNOSIS — F11.21 OPIOID DEPENDENCE IN REMISSION (H): ICD-10-CM

## 2023-08-02 DIAGNOSIS — F12.20 CANNABIS DEPENDENCE (H): ICD-10-CM

## 2023-08-02 DIAGNOSIS — F10.21 ALCOHOL USE DISORDER, SEVERE, IN EARLY REMISSION (H): ICD-10-CM

## 2023-08-02 DIAGNOSIS — F32.A DEPRESSION, UNSPECIFIED DEPRESSION TYPE: ICD-10-CM

## 2023-08-02 DIAGNOSIS — E55.9 VITAMIN D DEFICIENCY: ICD-10-CM

## 2023-08-02 PROCEDURE — 99207 PR SERVICE NOT STAFFED W/SUPERV PROV: CPT | Mod: VID

## 2023-08-02 ASSESSMENT — ANXIETY QUESTIONNAIRES
6. BECOMING EASILY ANNOYED OR IRRITABLE: NEARLY EVERY DAY
5. BEING SO RESTLESS THAT IT IS HARD TO SIT STILL: NEARLY EVERY DAY
2. NOT BEING ABLE TO STOP OR CONTROL WORRYING: NEARLY EVERY DAY
IF YOU CHECKED OFF ANY PROBLEMS ON THIS QUESTIONNAIRE, HOW DIFFICULT HAVE THESE PROBLEMS MADE IT FOR YOU TO DO YOUR WORK, TAKE CARE OF THINGS AT HOME, OR GET ALONG WITH OTHER PEOPLE: EXTREMELY DIFFICULT
3. WORRYING TOO MUCH ABOUT DIFFERENT THINGS: NEARLY EVERY DAY
1. FEELING NERVOUS, ANXIOUS, OR ON EDGE: NEARLY EVERY DAY
4. TROUBLE RELAXING: NEARLY EVERY DAY
GAD7 TOTAL SCORE: 21
GAD7 TOTAL SCORE: 21
7. FEELING AFRAID AS IF SOMETHING AWFUL MIGHT HAPPEN: NEARLY EVERY DAY

## 2023-08-02 ASSESSMENT — PAIN SCALES - GENERAL: PAINLEVEL: SEVERE PAIN (6)

## 2023-08-02 ASSESSMENT — PATIENT HEALTH QUESTIONNAIRE - PHQ9
SUM OF ALL RESPONSES TO PHQ QUESTIONS 1-9: 20
10. IF YOU CHECKED OFF ANY PROBLEMS, HOW DIFFICULT HAVE THESE PROBLEMS MADE IT FOR YOU TO DO YOUR WORK, TAKE CARE OF THINGS AT HOME, OR GET ALONG WITH OTHER PEOPLE: EXTREMELY DIFFICULT
SUM OF ALL RESPONSES TO PHQ QUESTIONS 1-9: 20

## 2023-08-02 NOTE — NURSING NOTE
Is the patient currently in the state of [place]? YES    Visit mode:VIDEO    If the visit is dropped, the patient can be reconnected by: VIDEO VISIT: Text to cell phone: [phone]    Will anyone else be joining the visit? NO      How would you like to obtain your AVS? MyChart    Are changes needed to the allergy or medication list? NO    Reason for visit: Consult    Unable to complete all qnrs due to time.    Eenida Okeefe, KEN on 8/2/2023 at 8:39 AM

## 2023-08-02 NOTE — PROGRESS NOTES
Virtual Visit Details    Type of service:  Video Visit   Video Start Time:  9:03 AM  Video End Time:10 AM    Originating Location (pt. Location): Home  Distant Location (provider location):  On-site  Platform used for Video Visit: [employer]

## 2023-08-02 NOTE — PATIENT INSTRUCTIONS
**For crisis resources, please see the information at the end of this document**   Patient Education    Thank you for coming to the [facility] [place] [facility].     Lab Testing:  If you had lab testing today and your results are reassuring or normal they will be mailed to you or sent through [employer] within 7 days. If the lab tests need quick action we will call you with the results. The phone number we will call with results is # [phone]. If this is not the best number please call our clinic and change the number.     Medication Refills:  If you need any refills please call your pharmacy and they will contact us. Our fax number for refills is [phone].   Three business days of notice are needed for general medication refill requests.   Five business days of notice are needed for controlled substance refill requests.   If you need to change to a different pharmacy, please contact the new pharmacy directly. The new pharmacy will help you get your medications transferred.     Contact Us:  Please call [phone] during business hours (8-5:00 M-F).   If you have medication related questions after clinic hours, or on the weekend, please call [phone].     Financial Assistance [phone]   Medical Records [phone]       MENTAL HEALTH CRISIS RESOURCES:  For a emergency help, please call 911 or go to the nearest Emergency Department.     Emergency Walk-In Options:   EmPATH Unit @ [facility] ([place]): [phone] - Specialized mental health emergency area designed to be calming  [facility] ([place]): [phone]  [facility] ([place]): [phone]  [facility]): [phone]    [facility] Crisis Information:   [place]: [phone]  [place]: [phone]  [place] (JOAN[place]) - Adult: [phone]     Child: [phone]  [place] - Adult: [phone]     Child: [phone]  [place]:  [phone]  List of all [facility] resources:   https://[place].gov/dhs/people-we-serve/adults/health-care/mental-health/resources/crisis-contacts.jsp    National Crisis Information:   Crisis Text Line: Text  [place]  to 707091  Suicide & Crisis Lifeline: 988  National Suicide Prevention Lifeline: 4-821-898-TALK (1-391.662.3934)       For online chat options, visit https://suicidepreventionlifeline.org/chat/  Poison Control Center: 9-825-858-7720  Trans Lifeline: 5-922-426-7283 - Hotline for transgender people of all ages  The [employer]: 3-355-006-9044 - Hotline for LGBT youth     For Non-Emergency Support:   Fast Tracker: Mental Health & Substance Use Disorder Resources -   https://www.[employer].org/

## 2023-08-04 PROBLEM — F10.939 ALCOHOL WITHDRAWAL (H): Status: RESOLVED | Noted: 2022-03-13 | Resolved: 2023-08-04

## 2023-08-04 PROBLEM — F10.930 ALCOHOL WITHDRAWAL SYNDROME WITHOUT COMPLICATION (H): Status: RESOLVED | Noted: 2021-03-05 | Resolved: 2023-08-04

## 2023-08-04 RX ORDER — CEPHALEXIN 500 MG/1
500 CAPSULE ORAL 4 TIMES DAILY
COMMUNITY
Start: 2023-06-26 | End: 2023-07-10

## 2023-08-04 RX ORDER — DOXYCYCLINE 100 MG/1
100 CAPSULE ORAL 2 TIMES DAILY
COMMUNITY
Start: 2023-06-26 | End: 2023-07-10

## 2023-08-23 ENCOUNTER — OFFICE VISIT (OUTPATIENT)
Dept: FAMILY MEDICINE | Facility: CLINIC | Age: 38
End: 2023-08-23
Payer: COMMERCIAL

## 2023-08-23 VITALS
OXYGEN SATURATION: 96 % | BODY MASS INDEX: 42.26 KG/M2 | SYSTOLIC BLOOD PRESSURE: 164 MMHG | HEIGHT: 70 IN | DIASTOLIC BLOOD PRESSURE: 120 MMHG | HEART RATE: 94 BPM | TEMPERATURE: 97.7 F | WEIGHT: 295.2 LBS | RESPIRATION RATE: 20 BRPM

## 2023-08-23 DIAGNOSIS — F10.10 ALCOHOL CONSUMPTION BINGE DRINKING: ICD-10-CM

## 2023-08-23 DIAGNOSIS — M54.50 CHRONIC MIDLINE LOW BACK PAIN WITHOUT SCIATICA: ICD-10-CM

## 2023-08-23 DIAGNOSIS — M25.562 CHRONIC PAIN OF BOTH KNEES: ICD-10-CM

## 2023-08-23 DIAGNOSIS — G89.29 CHRONIC MIDLINE LOW BACK PAIN WITHOUT SCIATICA: ICD-10-CM

## 2023-08-23 DIAGNOSIS — Z11.4 SCREENING FOR HIV (HUMAN IMMUNODEFICIENCY VIRUS): ICD-10-CM

## 2023-08-23 DIAGNOSIS — G89.29 OTHER CHRONIC PAIN: ICD-10-CM

## 2023-08-23 DIAGNOSIS — M25.561 CHRONIC PAIN OF BOTH KNEES: ICD-10-CM

## 2023-08-23 DIAGNOSIS — I10 BENIGN ESSENTIAL HYPERTENSION: Primary | ICD-10-CM

## 2023-08-23 DIAGNOSIS — G89.29 CHRONIC PAIN OF BOTH KNEES: ICD-10-CM

## 2023-08-23 DIAGNOSIS — Z72.0 NICOTINE ABUSE: ICD-10-CM

## 2023-08-23 PROCEDURE — 99214 OFFICE O/P EST MOD 30 MIN: CPT | Performed by: PHYSICIAN ASSISTANT

## 2023-08-23 PROCEDURE — 99000 SPECIMEN HANDLING OFFICE-LAB: CPT | Performed by: PHYSICIAN ASSISTANT

## 2023-08-23 PROCEDURE — 82533 TOTAL CORTISOL: CPT | Performed by: PHYSICIAN ASSISTANT

## 2023-08-23 PROCEDURE — 36415 COLL VENOUS BLD VENIPUNCTURE: CPT | Performed by: PHYSICIAN ASSISTANT

## 2023-08-23 PROCEDURE — 83835 ASSAY OF METANEPHRINES: CPT | Mod: 90 | Performed by: PHYSICIAN ASSISTANT

## 2023-08-23 PROCEDURE — 80048 BASIC METABOLIC PNL TOTAL CA: CPT | Performed by: PHYSICIAN ASSISTANT

## 2023-08-23 PROCEDURE — 84443 ASSAY THYROID STIM HORMONE: CPT | Performed by: PHYSICIAN ASSISTANT

## 2023-08-23 RX ORDER — METHADONE HYDROCHLORIDE 5 MG/5ML
55 SOLUTION ORAL DAILY
Status: CANCELLED | OUTPATIENT
Start: 2023-08-23

## 2023-08-23 RX ORDER — VARENICLINE TARTRATE 1 MG/1
1 TABLET, FILM COATED ORAL 2 TIMES DAILY
Qty: 60 TABLET | Refills: 3 | Status: SHIPPED | OUTPATIENT
Start: 2023-08-23 | End: 2023-09-17

## 2023-08-23 RX ORDER — AMLODIPINE AND BENAZEPRIL HYDROCHLORIDE 5; 10 MG/1; MG/1
1 CAPSULE ORAL DAILY
Qty: 30 CAPSULE | Refills: 0 | Status: CANCELLED | OUTPATIENT
Start: 2023-08-23

## 2023-08-23 RX ORDER — MELOXICAM 15 MG/1
15 TABLET ORAL DAILY
Qty: 60 TABLET | Refills: 1 | Status: SHIPPED | OUTPATIENT
Start: 2023-08-23 | End: 2024-03-28

## 2023-08-23 RX ORDER — PREGABALIN 25 MG/1
25 CAPSULE ORAL 2 TIMES DAILY
Qty: 60 CAPSULE | Refills: 1 | Status: SHIPPED | OUTPATIENT
Start: 2023-08-23 | End: 2023-11-30

## 2023-08-23 RX ORDER — AMLODIPINE AND BENAZEPRIL HYDROCHLORIDE 10; 40 MG/1; MG/1
1 CAPSULE ORAL DAILY
Qty: 30 CAPSULE | Refills: 1 | Status: SHIPPED | OUTPATIENT
Start: 2023-08-23 | End: 2023-09-17

## 2023-08-23 RX ORDER — ACAMPROSATE CALCIUM 333 MG/1
666 TABLET, DELAYED RELEASE ORAL 3 TIMES DAILY
Qty: 180 TABLET | Refills: 2 | Status: SHIPPED | OUTPATIENT
Start: 2023-08-23 | End: 2024-03-28

## 2023-08-23 ASSESSMENT — PAIN SCALES - GENERAL: PAINLEVEL: SEVERE PAIN (6)

## 2023-08-23 NOTE — PROGRESS NOTES
"    Patrick Omalley is a 38 year old, presenting for the following health issues:  Recheck Medication      8/23/2023     3:06 PM   Additional Questions   Roomed by Danni   Accompanied by Self     Patient wants to quit smoking as well and wants something to help him quit.       History of Present Illness       Hypertension: He presents for follow up of hypertension.  He does not check blood pressure  regularly outside of the clinic. Outside blood pressures have been over 140/90. He does not follow a low salt diet.     Headaches:   Since the patient's last clinic visit, headaches are: worsened  The patient is getting headaches:  Twice a week  He is not able to do normal daily activities when he has a migraine.  The patient is taking the following rescue/relief medications:  No rescue/relief medications   Patient states \"I get no relief\" from the rescue/relief medications.   The patient is taking the following medications to prevent migraines:  No medications to prevent migraines  In the past 4 weeks, the patient has gone to an Urgent Care or Emergency Room 0 times times due to headaches.    He eats 4 or more servings of fruits and vegetables daily.He consumes 3 sweetened beverage(s) daily.He exercises with enough effort to increase his heart rate 10 to 19 minutes per day.  He exercises with enough effort to increase his heart rate 5 days per week. He is missing 1 dose(s) of medications per week.       Pain History:  When did you first notice your pain? Many years of back pain, and knee pain    Have you seen this provider for your pain in the past? Yes  Where in your body do you have pain? Back and knees    Are you seeing anyone else for your pain? No        11/3/2022    10:56 AM 6/27/2023     3:03 PM 8/2/2023     8:46 AM   PHQ-9 SCORE   PHQ-9 Total Score Yasmine 25 (Severe depression) 25 (Severe depression) 20 (Severe depression)   PHQ-9 Total Score 25 25 20           7/28/2011     9:35 AM 1/12/2022     1:00 PM " "8/2/2023     8:47 AM   JALEN-7 SCORE   Total Score 21     Total Score   21 (severe anxiety)   Total Score  21 21               11/3/2022    10:56 AM 6/27/2023     3:03 PM 8/2/2023     8:46 AM   PHQ-9 SCORE   PHQ-9 Total Score Tobinhart 25 (Severe depression) 25 (Severe depression) 20 (Severe depression)   PHQ-9 Total Score 25 25 20           7/28/2011     9:35 AM 1/12/2022     1:00 PM 8/2/2023     8:47 AM   JALEN-7 SCORE   Total Score 21     Total Score   21 (severe anxiety)   Total Score  21 21            No data to display                Chronic Pain Follow Up:    Location of pain: Back and knees  Analgesia/pain control:    - Recent changes:  None    - Overall control: Tolerable with discomfort    - Current treatments: Methadone, and lyrica   Adherence:     - Do you ever take more pain medicine than prescribed? No    - When did you take your last dose of pain medicine?  Today 8/ 23/2023 1pm  Adverse effects: No   PDMP Review         Value Time User    State PDMP site checked  Yes 1/12/2022  2:07 PM Ignacia Lane APRN CNP          Last CSA Agreement:   CSA -- Patient Level:    CSA: None found at the patient level.       Chronic pain has been stable.     Blood pressure elevated. Occasional ha's. No palpitations or episodic sweats.  No chest pain/sob.  Weight stable.    Review of Systems   Constitutional, HEENT, cardiovascular, pulmonary, GI, , musculoskeletal, neuro, skin, endocrine and psych systems are negative, except as otherwise noted.      Objective    BP (!) 164/120   Pulse 94   Temp 97.7  F (36.5  C) (Temporal)   Resp 20   Ht 1.77 m (5' 9.69\")   Wt 133.9 kg (295 lb 3.2 oz)   SpO2 96%   BMI 42.74 kg/m    Body mass index is 42.74 kg/m .  Physical Exam   Blood pressure's symmetric.  Eye exam - right eye normal lid, conjunctiva, cornea, pupil and fundus, left eye normal lid, conjunctiva, cornea, pupil and fundus.  Thyroid not palpable, not enlarged, no nodules detected.  CHEST:chest clear to IPPA, no " tachypnea, retractions or cyanosis, and S1, S2 normal, no murmur, no gallop, rate regular.  Pulses symmetric.  No edema.    Shahram was seen today for recheck medication.    Diagnoses and all orders for this visit:    Benign essential hypertension  -     Basic metabolic panel  (Ca, Cl, CO2, Creat, Gluc, K, Na, BUN); Future  -     Cortisol; Future  -     Metanephrines Plasma Free; Future  -     TSH with free T4 reflex; Future  -     Catecholamines fractioned free urine; Future  -     amLODIPine-benazepril (LOTREL) 10-40 MG capsule; Take 1 capsule by mouth daily    Alcohol consumption binge drinking  -     acamprosate (CAMPRAL) 333 MG EC tablet; Take 2 tablets (666 mg) by mouth 3 times daily    Chronic pain of both knees  -     meloxicam (MOBIC) 15 MG tablet; Take 1 tablet (15 mg) by mouth daily    Chronic midline low back pain without sciatica  -     meloxicam (MOBIC) 15 MG tablet; Take 1 tablet (15 mg) by mouth daily    Other chronic pain  -     pregabalin (LYRICA) 25 MG capsule; Take 1 capsule (25 mg) by mouth 2 times daily    Screening for HIV (human immunodeficiency virus)    Nicotine abuse  -     varenicline (CHANTIX XAVI) 0.5 MG X 11 & 1 MG X 42 tablet; Take 0.5 mg tab daily for 3 days, THEN 0.5 mg tab twice daily for 4 days, THEN 1 mg twice daily.  -     varenicline (CHANTIX) 1 MG tablet; Take 1 tablet (1 mg) by mouth 2 times daily      Increased lotrel dose.   Exercise. Lower salt diet. Quit smoking.   Recheck in 4-6 wks.

## 2023-08-24 LAB
ANION GAP SERPL CALCULATED.3IONS-SCNC: 14 MMOL/L (ref 7–15)
BUN SERPL-MCNC: 16.3 MG/DL (ref 6–20)
CALCIUM SERPL-MCNC: 9.5 MG/DL (ref 8.6–10)
CHLORIDE SERPL-SCNC: 102 MMOL/L (ref 98–107)
CORTIS SERPL-MCNC: 2.5 UG/DL
CREAT SERPL-MCNC: 0.68 MG/DL (ref 0.67–1.17)
DEPRECATED HCO3 PLAS-SCNC: 23 MMOL/L (ref 22–29)
GFR SERPL CREATININE-BSD FRML MDRD: >90 ML/MIN/1.73M2
GLUCOSE SERPL-MCNC: 111 MG/DL (ref 70–99)
POTASSIUM SERPL-SCNC: 3.8 MMOL/L (ref 3.4–5.3)
SODIUM SERPL-SCNC: 139 MMOL/L (ref 136–145)
TSH SERPL DL<=0.005 MIU/L-ACNC: 2.02 UIU/ML (ref 0.3–4.2)

## 2023-08-28 LAB
ANNOTATION COMMENT IMP: NORMAL
METANEPHS SERPL-SCNC: 0.19 NMOL/L
NORMETANEPHRINE SERPL-SCNC: 0.39 NMOL/L

## 2023-09-19 ENCOUNTER — VIRTUAL VISIT (OUTPATIENT)
Dept: SLEEP MEDICINE | Facility: CLINIC | Age: 38
End: 2023-09-19
Payer: COMMERCIAL

## 2023-09-19 VITALS — BODY MASS INDEX: 42.23 KG/M2 | WEIGHT: 295 LBS | HEIGHT: 70 IN

## 2023-09-19 DIAGNOSIS — F19.10 POLYSUBSTANCE ABUSE (H): Chronic | ICD-10-CM

## 2023-09-19 DIAGNOSIS — G47.9 DISTURBANCE IN SLEEP BEHAVIOR: ICD-10-CM

## 2023-09-19 DIAGNOSIS — F51.04 CHRONIC INSOMNIA: ICD-10-CM

## 2023-09-19 DIAGNOSIS — F11.21 OPIOID DEPENDENCE IN REMISSION (H): ICD-10-CM

## 2023-09-19 DIAGNOSIS — R53.83 MALAISE AND FATIGUE: ICD-10-CM

## 2023-09-19 DIAGNOSIS — G47.10 ORGANIC HYPERSOMNIA: ICD-10-CM

## 2023-09-19 DIAGNOSIS — F10.90 ALCOHOL USE DISORDER: ICD-10-CM

## 2023-09-19 DIAGNOSIS — R06.83 SNORING: Primary | ICD-10-CM

## 2023-09-19 DIAGNOSIS — R53.81 MALAISE AND FATIGUE: ICD-10-CM

## 2023-09-19 DIAGNOSIS — E66.01 MORBID OBESITY (H): Chronic | ICD-10-CM

## 2023-09-19 PROBLEM — G89.29 CHRONIC PAIN: Chronic | Status: ACTIVE | Noted: 2023-09-19

## 2023-09-19 PROBLEM — K70.10 ALCOHOLIC HEPATITIS WITHOUT ASCITES (H): Chronic | Status: RESOLVED | Noted: 2023-03-13 | Resolved: 2023-09-19

## 2023-09-19 PROBLEM — F10.229 ALCOHOL DEPENDENCE WITH INTOXICATION WITH COMPLICATION (H): Chronic | Status: RESOLVED | Noted: 2022-03-31 | Resolved: 2023-09-19

## 2023-09-19 PROCEDURE — 99244 OFF/OP CNSLTJ NEW/EST MOD 40: CPT | Mod: VID | Performed by: INTERNAL MEDICINE

## 2023-09-19 PROCEDURE — 82384 ASSAY THREE CATECHOLAMINES: CPT | Mod: 90 | Performed by: PHYSICIAN ASSISTANT

## 2023-09-19 PROCEDURE — 99000 SPECIMEN HANDLING OFFICE-LAB: CPT | Performed by: PHYSICIAN ASSISTANT

## 2023-09-19 ASSESSMENT — SLEEP AND FATIGUE QUESTIONNAIRES
HOW LIKELY ARE YOU TO NOD OFF OR FALL ASLEEP WHILE SITTING QUIETLY AFTER LUNCH WITHOUT ALCOHOL: HIGH CHANCE OF DOZING
HOW LIKELY ARE YOU TO NOD OFF OR FALL ASLEEP WHILE LYING DOWN TO REST IN THE AFTERNOON WHEN CIRCUMSTANCES PERMIT: HIGH CHANCE OF DOZING
HOW LIKELY ARE YOU TO NOD OFF OR FALL ASLEEP IN A CAR, WHILE STOPPED FOR A FEW MINUTES IN TRAFFIC: WOULD NEVER DOZE
HOW LIKELY ARE YOU TO NOD OFF OR FALL ASLEEP WHILE SITTING AND READING: WOULD NEVER DOZE
HOW LIKELY ARE YOU TO NOD OFF OR FALL ASLEEP WHILE WATCHING TV: HIGH CHANCE OF DOZING
HOW LIKELY ARE YOU TO NOD OFF OR FALL ASLEEP WHILE SITTING AND TALKING TO SOMEONE: WOULD NEVER DOZE
HOW LIKELY ARE YOU TO NOD OFF OR FALL ASLEEP WHEN YOU ARE A PASSENGER IN A CAR FOR AN HOUR WITHOUT A BREAK: HIGH CHANCE OF DOZING
HOW LIKELY ARE YOU TO NOD OFF OR FALL ASLEEP WHILE SITTING INACTIVE IN A PUBLIC PLACE: HIGH CHANCE OF DOZING

## 2023-09-19 ASSESSMENT — PAIN SCALES - GENERAL: PAINLEVEL: MILD PAIN (2)

## 2023-09-19 NOTE — NURSING NOTE
Is the patient currently in the state of [place]? YES    Visit mode:VIDEO    If the visit is dropped, the patient can be reconnected by: VIDEO VISIT: Text to cell phone:   Telephone Information:   Mobile [phone]       Will anyone else be joining the visit? NO  (If patient encounters technical issues they should call [phone]    How would you like to obtain your AVS? MyChart    Are changes needed to the allergy or medication list? Pt stated no changes to allergies    Reason for visit: No chief complaint on file.    Denise JOSE

## 2023-09-19 NOTE — PATIENT INSTRUCTIONS
Read the book Say Good Night To Insomnia         Insomnia and Behavioral Sleep Medicine Program    The [facility] Insomnia and Behavioral Sleep Medicine Program provides non-drug treatment for sleep problems including:    Cognitive-behavioral Therapies for Insomnia (CBT-I)  Management of Shift-work and Jet Lag  Management of Delayed, Advanced and Irregular Circadian Rhythm Sleep Disorders  Imagery Rehearsal Therapy (IRT) for Nightmare Disorder  PAP Therapy Desensitization    You have been referred for consultation with a sleep psychologist who specializes in behavioral sleep medicine and treatment of insomnia.  The [facility] Insomnia and Behavioral Sleep Medicine Program offers individualized telehealth services through our [facility] and online CBT-I.    Preparing for your Consultation    You will need to keep a Sleep Diary for at least a week prior to your visit. Complete the sleep diary each day first thing after you get up by answering a few key questions about your sleep using our convenient mobile reyes or paper sleep diary.  Your answers should be based on your recall of the past 24 hours.  Avoid watching the clock or recording data during the night.     Insomnia  Reyes    The Insomnia  mobile reyes  is a convenient way to keep track of your sleep prior to your sleep consultation.  Simply download the free reyes on your Apple or Android phone and record your information each morning.  The reyes includes training, self-assessment, and sleep schedule recommendations.  Prior to your consultation we recommend you use only the sleep diary function. You can e-mail yourself a copy of your sleep diary data by going to the Settings section and using the [place] User Data function.  During your consultation your provider will review the data with you.          [facility] Sleep Diary    You can also track your sleep using the [facility] paper sleep diary.  You can upload  your sleep diary and send it via a [employer] Smart message, fax it to [phone], or have it with you at the time of your consultation.            CBT-I:  Frequently Asked Questions    What is CBT-I?    Cognitive Behavioral Therapy for Insomnia, also known as CBT-I, is a highly effective non-drug treatment for insomnia. The American College of Physicians recommends CBT-I as the first treatment for chronic insomnia.  Research has shown CBT-I to be safer and more effective long term than sleeping pills.    What does CBT-I involve?     CBT-I targets behaviors that lead to chronic insomnia:  Habits that weaken the bed as a cue for sleep  Habits that weaken your body's sleep drive and sleep/wake clock   Unhelpful sleep thoughts that increase sleep-related worry and arousal.    The process involves 3-6 telehealth visits that guide you to implement proven strategies to get a better night's sleep.    People often see improvement in their sleep within a few weeks. Research shows if you keep practicing the skills you learn your sleep is likely to continue to improve 6-12 months after treatment.    Does this program prescribe or manage sleep medication?    No.  Your prescribing provider is responsible to assist you in managing your sleep medications.  Some people choose to stop using sleep medication prior to or during CBT-I.  Our program can work with your prescribing provider to help reduce or eliminate use of sleep medications.     Getting Started Today!    If you haven't already done so, we recommend you consider making the following changes to your sleep habits prior to your sleep consultation:     Reduce your consumption of caffeine and alcohol.  Both can disrupt sleep and make strengthening your sleep more difficult.  Specifically:    - Avoid caffeine within 6 hours of bedtime   - No more than 3 caffeinated beverages per day (e.g. 8 oz. cup coffee or 12 oz. cup soda)            - No alcohol within 3 hours of bedtime    Make  sure your bedroom is quiet, comfortable and dark.  Noise, light and an uncomfortable sleep space can harm your sleep.      Keep the same sleep schedule 7 days a week.unless you do shift work.      Online CBT-I     If you want to get started today, research indicates that online CBT-I can be effective for some individuals. These programs requires comfort with itz-based or online learning.  However, digital CBT-I programs are not for everyone.  Contraindications include:    Seizure disorders,   Bipolar disorder,   Unstable medical or mental health conditions,   Frailty or risk of falling  Pregnancy    You should consult a sleep specialist before using these resources if you have:    Sleep Apnea  Restless Leg Syndrome  Sleep Walking  REM behavior disorder  Night Terrors  Excessive Daytime Sleepiness  Are engaged in shift work  Use prescription sleep medication    Our Online CBT-I program    If your sleep provider recommends online CBT-I for you , the cost for an entire 6-week program is $40.    To get started, copy and paste the link below which will take you to the landing page to register:                           [email or web address]               If you wish to complete the online CBT-I program but do not plan to follow-up with a sleep provider, you are set to begin the program.    If you are planning to work with an [facility] sleep provider, there are a couple of extra steps you can take to share your sleep data with your sleep provider.  To share sleep log data, go to the left side navigation and click on the  share sleep log  button:         You will be taken to the page below where you will enter  the provider code [email or web address] into the box.          Once you press the locate button, the information for [facility] will pop up as below.  By pressing the Submit button your data will be sent to our  secure [facility] sleep program portal and is available for review by your provider. You will  only need to do this step once.                                  Self-help Workbooks for Insomnia    If you have found self-help books useful in the past, you may want to consider reading one of the following books prior to your consultation:    Say Vasiliy to Insomnia: The Six-Week, Drug-Free Program Developed at [facility].  Jason Fierro MD. Available in paperback, Minerva, and audiobook.    Overcoming Insomnia: A Cognitive-Behavioral Therapy Approach, Workbook.  Todd White, PhD  and Brionna Wells, PhD.  Available in paperback and Minerva.    Quiet Your Mind and Get to Sleep: Solutions to Insomnia for Those with Depression, Anxiety, or Chronic Pain.  Natasha Peck, PhD and Brionna Wells, PhD.  Available in paperback and Minerva           Your BMI is Body mass index is 42.94 kg/m .    What is BMI?  Body mass index (BMI) is one way to tell whether you are at a healthy weight, overweight, or obese. It measures your weight in relation to your height.  A BMI of 18.5 to 24.9 is in the healthy range. A person with a BMI of 25 to 29.9 is considered overweight, and someone with a BMI of 30 or greater is considered obese.  Another way to find out if you are at risk for health problems caused by overweight and obesity is to measure your waist. If you are a woman and your waist is more than 35 inches, or if you are a man and your waist is more than 40 inches, your risk of disease may be higher.  More than two-thirds of American adults are considered overweight or obese. Being overweight or obese increases the risk for further weight gain.  Excess weight may lead to heart disease and diabetes. Creating and following plans for healthy eating and physical activity may help you improve your health.    Methods for maintaining or losing weight.  Weight control is part of healthy lifestyle and includes exercise, emotional health, and healthy eating habits.  Careful eating habits lifelong is the mainstay of weight  control.  Though there are significant health benefits from weight loss, long-term weight loss with diet alone may be very difficult to achieve- studies show long-term success with dietary management in less than 10% of people. Attaining a healthy weight may be especially difficult to achieve in those with severe obesity. In some cases, medications, devices and surgical management might be considered.    What can you do?  If you are overweight or obese and are interested in methods for weight loss, you should discuss this with your provider. In addition, we recommend that you review healthy life styles and methods for weight loss available through the National Institutes of Health patient information sites:   http://win.niddk.nih.gov/publications/index.htm

## 2023-09-19 NOTE — PROGRESS NOTES
Virtual Visit Details    Type of service:  Video Visit   Video Start Time: 2:36 PM  Video End Time:3:22 PM    Originating Location (pt. Location): Home    Distant Location (provider location):  Off-site  Platform used for Video Visit: Berkley
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
## 2023-09-23 LAB
CATECHOLS UR-IMP: ABNORMAL
CREAT 24H UR-MRATE: 2275 MG/D
CREAT UR-MCNC: 72 MG/DL
DOPAMINE 24H UR-MRATE: 398 UG/D
DOPAMINE UR-MCNC: 126 UG/L
DOPAMINE/CREAT UR: 175 UG/G CRT
EPINEPH 24H UR-MRATE: 22 UG/D
EPINEPH UR-MCNC: 7 UG/L
EPINEPH/CREAT UR: 10 UG/G CRT
NOREPINEPH 24H UR-MRATE: 139 UG/D
NOREPINEPH UR-MCNC: 44 UG/L
NOREPINEPH/CREAT UR: 61 UG/G CRT

## 2023-11-15 ENCOUNTER — OFFICE VISIT (OUTPATIENT)
Dept: PALLIATIVE MEDICINE | Facility: CLINIC | Age: 38
End: 2023-11-15
Payer: COMMERCIAL

## 2023-11-15 VITALS — HEART RATE: 66 BPM | SYSTOLIC BLOOD PRESSURE: 151 MMHG | DIASTOLIC BLOOD PRESSURE: 97 MMHG

## 2023-11-15 DIAGNOSIS — M25.562 CHRONIC PAIN OF BOTH KNEES: ICD-10-CM

## 2023-11-15 DIAGNOSIS — G89.29 OTHER CHRONIC PAIN: ICD-10-CM

## 2023-11-15 DIAGNOSIS — M79.18 MYOFASCIAL PAIN: ICD-10-CM

## 2023-11-15 DIAGNOSIS — M54.50 CHRONIC MIDLINE LOW BACK PAIN WITHOUT SCIATICA: Primary | ICD-10-CM

## 2023-11-15 DIAGNOSIS — M54.2 NECK PAIN: ICD-10-CM

## 2023-11-15 DIAGNOSIS — G89.29 CHRONIC NONINTRACTABLE HEADACHE, UNSPECIFIED HEADACHE TYPE: ICD-10-CM

## 2023-11-15 DIAGNOSIS — M25.561 CHRONIC PAIN OF BOTH KNEES: ICD-10-CM

## 2023-11-15 DIAGNOSIS — F11.20 PATIENT ON METHADONE MAINTENANCE THERAPY (H): ICD-10-CM

## 2023-11-15 DIAGNOSIS — G89.29 CHRONIC MIDLINE LOW BACK PAIN WITHOUT SCIATICA: Primary | ICD-10-CM

## 2023-11-15 DIAGNOSIS — M54.10 RADICULAR LEG PAIN: ICD-10-CM

## 2023-11-15 DIAGNOSIS — G89.29 CHRONIC PAIN OF BOTH KNEES: ICD-10-CM

## 2023-11-15 DIAGNOSIS — R51.9 CHRONIC NONINTRACTABLE HEADACHE, UNSPECIFIED HEADACHE TYPE: ICD-10-CM

## 2023-11-15 DIAGNOSIS — R10.32 LLQ ABDOMINAL PAIN: ICD-10-CM

## 2023-11-15 DIAGNOSIS — F19.90 POLYSUBSTANCE USE DISORDER: ICD-10-CM

## 2023-11-15 DIAGNOSIS — G89.4 CHRONIC PAIN SYNDROME: ICD-10-CM

## 2023-11-15 PROCEDURE — 99205 OFFICE O/P NEW HI 60 MIN: CPT

## 2023-11-15 RX ORDER — PREGABALIN 50 MG/1
50 CAPSULE ORAL 2 TIMES DAILY
Qty: 60 CAPSULE | Refills: 1 | Status: SHIPPED | OUTPATIENT
Start: 2023-11-15 | End: 2024-01-10

## 2023-11-15 ASSESSMENT — PAIN SCALES - GENERAL: PAINLEVEL: SEVERE PAIN (7)

## 2023-11-15 NOTE — PATIENT INSTRUCTIONS
Pain Physical Therapy:  YES   I am referring for targeting stretching, strengthening, and home exercise plan to support functional goals/ADLs.  If you have not been contacted to schedule within 2 business days, please call [phone] to make an appointment.     Pain Psychologist to address relaxation, behavioral change, coping style, and other factors important to improvement.  NO - may consider referral in future if needed.     Diagnostic Studies:  Bilateral knee and lumbar MRIs ordered 6/28/23 by PCP, not yet completed. Advised to complete MRIs in between visits.     IMAGING SERVICES HOURS:    All imaging modalities are available from 7 a.m. - 9 p.m. Monday through Friday  X-ray, CT, MRI, and General Ultrasound appointments are available from 7 a.m. -3:30 p.m. on Saturdays  X-ray, CT and MRI appointments are available from 8 a.m. - 4:30 p.m. on Sundays  Please call [phone] to schedule imaging exams      Medication Management:   Increase Lyrica to 50 mg twice daily. Monitor for changes in baseline pain levels and intensity of fluctuations. Pending outcome with initial dosing, may consider further increase at follow up. Updated prescription sent into pharmacy today.   Monitor for sedation - may cause dizziness or drowsiness. Be careful driving/moving around until you know effects of medication. Avoid concurrent alcohol use.     Potential procedures:   Deferred - may consider procedures in future, pending MRI results and outcome with initial therapies.     Other Orders/Referrals:   Addiction Medicine referral - he is currently on methadone for opioid/heroin use disorder through [facility]. He is interested in exploring possible transition from methadone to Suboxone.   Blood pressure - recommend follow up with primary care provider to address elevated blood pressure. He is asymptomatic today.     Follow up with NACHO Ramos CNP in 6-8 weeks        ----------------------------------------------------------------  [facility] Number:  [phone]   Call with any questions about your care and for scheduling assistance.   Calls are returned Monday through Friday between 8 AM and 4:30 PM. We usually get back to you within 2 business days depending on the issue/request.    If we are prescribing your medications:  For opioid medication refills, call the clinic or send a [facility] message 7 days in advance.  Please include:  Name of requested medication  Name of the pharmacy.  For non-opioid medications, call your pharmacy directly to request a refill. Please allow 3-4 days to be processed.   Per [place] State Law:  All controlled substance prescriptions must be filled within 30 days of being written.    For those controlled substances allowing refills, pickup must occur within 30 days of last fill.      We believe regular attendance is key to your success in our program!    Any time you are unable to keep your appointment we ask that you call us at least 24 hours in advance to cancel.This will allow us to offer the appointment time to another patient.   Multiple missed appointments may lead to dismissal from the clinic.

## 2023-11-15 NOTE — PROGRESS NOTES
[facility] Pain Management     Date of visit: 11/15/2023    Assessment:  Shahram Young is a 38 year old male with a past medical history significant for chronic abdominal pain, tobacco use, polysub use, depression/anxiety/ptsd, HTN, s/p left knee ligament repair in 2012, who presents with complaints of widespread pain, referred for low back and knee pain.     Widespread pain - onset of pain in adolescence, with progressive worsening over time. He has hx of left knee surgery and right foot surgery, s/p partial left sided colectomy. Of note, he also has hx of polysubstance use, including opioid/heroin and is currently on methadone therapy. No recent advanced lumbar imaging. Etiology of widespread pain is likely multifactorial though not entirely clear, suspect underlying degenerative spine changes contributing to an extent, suspect central sensitization component, with overlying myofascial component.     Assigned to [place] nursing team.     Visit diagnoses:   1. Chronic midline low back pain without sciatica    2. Chronic pain of both knees    3. Other chronic pain    4. Polysubstance use disorder    5. Patient on methadone maintenance therapy (H)    6. Neck pain    7. Chronic nonintractable headache, unspecified headache type    8. Radicular leg pain    9. LLQ abdominal pain    10. Myofascial pain    11. Chronic pain syndrome        Plan:  The following recommendations were given to the patient. Diagnosis, treatment options, risks, benefits, and alternatives were discussed, and all questions were answered. The patient expressed understanding of the plan for management.     I am recommending a multidisciplinary treatment plan to help this patient better manage his pain.  This includes:     Pain Physical Therapy:  YES   I am referring for targeting stretching, strengthening, and home exercise plan to support functional goals/ADLs.  If you have not been contacted to schedule within 2 business days, please  call [phone] to make an appointment.     Pain Psychologist to address relaxation, behavioral change, coping style, and other factors important to improvement.  NO - may consider referral in future if needed.     Diagnostic Studies:  Bilateral knee and lumbar MRIs ordered 6/28/23 by PCP, not yet completed. Advised to complete MRIs in between visits.     Medication Management:   Increase Lyrica to 50 mg twice daily. Monitor for changes in baseline pain levels and intensity of fluctuations. Pending outcome with initial dosing, may consider further increase at follow up. Updated prescription sent into pharmacy today.   Monitor for sedation - may cause dizziness or drowsiness. Be careful driving/moving around until you know effects of medication. Avoid concurrent alcohol use.     Potential procedures:   Deferred - may consider procedures in future, pending MRI results and outcome with initial therapies.     Other Orders/Referrals:   Addiction Medicine referral - he is currently on methadone for opioid/heroin use disorder through [facility]. He is interested in exploring possible transition from methadone to Suboxone.   Blood pressure - recommend follow up with primary care provider to address elevated blood pressure. He is asymptomatic today.     Follow up with NACHO Ramos CNP in 6-8 weeks       Review of Electronic Chart: Today I have also reviewed available medical information in the patient's medical record at [facility] ([facility]) and Care Everywhere (if available), including relevant provider notes, laboratory work, and imaging.     Loyda Byrd DNP, NACHO, AGNP-C  [facility] Pain Management         -------------------------------------------------------------------    Subjective     Reason for consultation:    Shahram Young is a 38 year old male who is seen in consultation today at the request of PCP Severo Armendariz PA-C for evaluation of his pain issues and recommendations for management,  with specific emphasis on  Chronic pain of both knees [M25.561, M25.562, G89.29]       Chronic midline low back pain without sciatica [M54.50, G89.29]      Reason for Referral:Comprehensive Pain Evaluation Are there any red flags that may impact the assessment or management of the patient?Active or Recent Alcohol, Drug or Prescription Medication Misuse Please Specify:hx of alcoholism and narcotic addiction Provider, please review opioid agreement in the process instructions above. Do you agree to these terms?Yes    Please see the [facility] Pain Management [place] health questionnaire which the patient completed and reviewed with me in detail (if available).     Review of [place] Prescription Monitoring Program (): No concern for abuse or misuse of controlled medications based on this report. Reviewed - ongoing Lyrica from PCP    Review of Electronic Chart: Today I have also reviewed available medical information in the patient's medical record at [facility] ([facility]), including relevant provider notes, laboratory work, and imaging.     Pain medications are being prescribed by PCP - Lyrica.     Chief Complaint:    Chief Complaint   Patient presents with    Pain         HPI:     Shahram Young is a 38 year old male presents with a chief complaint of widespread pain - headaches, neck, abdominal, low back and knees (dx assoc with referral), BLE (states this is the worse).     The pain has been present for since he was teenager, states he broke multiple bones and that was onset of pain.    The pain is Severe Pain (7) in severity.    The pain is described as constant, dull aching and sharp, fluctuating intensity. He reports neck pain is numb and burning prominent for the past month.   The pain is alleviated by lying down/rest, Lyrica and meloxicam, heat/ice, stretching/getting up and moving around, TENS unit.    It is exacerbated by - worse in the morning, then gets better after he gets up and moving, then notices  "increased pain later on in the day after work.  Stairs exacerbate pain, as well as getting in/out of cars particularly smaller cars.   Modalities that have been utilized in the past which were helpful include PT (referred after injury/surgery), medications (mixed benefit).    Things that were not helpful, but tried ,include injections (not able to recall specifics, not sure what and how helpful), medications (mixed benefit).    The patient has never tried pain PT, spinal injections.  The patient otherwise denies bowel or bladder incontinence, parasthesias, weakness, saddle anesthesia, unintentional weight loss, or fever/chills/sweats.   Shahram Young has not been seen at a pain clinic in the past.      -He follows with methadone clinic for opioid and heroin use disorder, goes to [place], states current dose is 52 mg/day.   -He is interested in possible transition from methadone to Suboxone, would like referral to addiction medicine.   -He works full time at the [employer], he moves cars and some physical demands.   -He does drink \"a little bit\" of alcohol, states in the last couple of week since breaking up with girlfriend. States it is not every day, but when he does he may drink up to 1/2 bottle of whiskey.       Current Pain Treatments:    Medications:    Lyrica 50 mg BID    Meloxicam 15 mg daily    Methadone - 52 mg/day    2. Other therapies:    Pain PT   Lumbar and knee MRIs ordered by PCP, advised to complete       Current Outpatient Medications   Medication    acamprosate (CAMPRAL) 333 MG EC tablet    amLODIPine-benazepril (LOTREL) 10-40 MG capsule    meloxicam (MOBIC) 15 MG tablet    methadone (DOLPHINE) 5 MG/5ML solution    pregabalin (LYRICA) 25 MG capsule    pregabalin (LYRICA) 50 MG capsule    varenicline (CHANTIX XAVI) 0.5 MG X 11 & 1 MG X 42 tablet    varenicline (CHANTIX) 1 MG tablet     No current facility-administered medications for this visit.     Allergies   Allergen Reactions    Sulfa " Antibiotics Swelling and Anaphylaxis    Pcn [Penicillins]      Possibly rash noted-- unsure      Past Medical History:   Diagnosis Date    Alcohol dependence with intoxication with complication (H) 03/31/2022    Alcoholic hepatitis without ascites (H28) 03/13/2023    Anxiety     Arthritis     Depressive disorder     Diverticulitis 01/2013    S/p partial colectomy    Epididymitis 08/01/2011    PTSD (post-traumatic stress disorder)     Substance abuse (H)     Opiates, Alcohol    Suicidal ideation 07/2011    hosp [facility] 7/2011     Past Surgical History:   Procedure Laterality Date    ARTHROSCOPY KNEE RT/LT  07/10/2012    Left Knee. Joint debridement, ligament repair. [facility].    COLONOSCOPY  11/04/2011    Procedure:COLONOSCOPY; colonoscopy; Surgeon:ITZ MARTE; Location:[facility]    COLONOSCOPY  11/01/2011    Negative colonoscopy    COLONOSCOPY  06/26/2012    [facility]    ORTHOPEDIC SURGERY  2008    right foot had screw removed in 2008    Partial left colectomy 1-3-13  01/03/2013    diverticulitis    WRIST SURGERY Left 2015    ORIF     Family History   Problem Relation Age of Onset    Hypertension Father     Breast Cancer Maternal Grandmother     Cancer Paternal Grandmother     C.A.D. 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/,manufacturing   Tobacco Use    Smoking status: Every Day     Packs/day: 1.00     Years: 10.00     Additional pack years: 0.00     Total pack years: 10.00     Types: Cigarettes     Passive exposure: Never    Smokeless tobacco: Never    Tobacco comments:     Planning  use of Nicotrol for stopping cigarettes   Vaping Use    Vaping Use: Never used   Substance and Sexual Activity    Alcohol use: Not Currently     Comment: Pt has been drinking 1L whiskey per day    Drug use: Yes     Frequency: 7.0 times per week     Types: Marijuana     Comment: usually has 1-2 hits at night to sleep, past opiod use problems and methamphetamine, cocaine use    Sexual activity: Yes     Partners: Female     Birth control/protection: Pill   Other Topics Concern     Service No    Blood Transfusions No    Caffeine Concern No    Occupational Exposure No    Hobby Hazards Yes     Comment: motorcycle and snow boarding     Sleep Concern No    Stress Concern No    Weight Concern No    Special Diet No     Comment: watches what he eats     Back Care Yes     Comment: currently seeing chiro     Exercise Yes    Bike Helmet Yes    Seat Belt Yes    Self-Exams Yes   Social History Narrative    ** Merged History Encounter **           ROS: 10 point ROS neg other than the symptoms noted above in the HPI.      Objective      Diagnostic Testing - Imaging/Labs:    Labs:    CMP 9/30/23 - WNL, GFR >60     Imaging:   Examination:  XR KNEE STANDING AP BILAT AND LATERAL RIGHT     Date:  6/28/2023 2:40 PM      Clinical Information: Chronic bilateral knee pain.     Comparison: none.                                                                      Impression:     1.  Normal joint alignment and spacing. No fracture or concerning bone  lesion. No significant right knee joint effusion. Normal periarticular  soft tissues.     LUIS EDUARDO ROMEO MD     LUMBAR SPINE TWO TO THREE VIEWS 11/3/2022 12:22 PM      COMPARISON: None.     HISTORY: Pain of left lower leg.                                                                      IMPRESSION: Five lumbar type vertebrae. Normal alignment. Bony  deformity of the anterior superior corner of the L3 vertebral body  could represent a degenerative Schmorl's node deformity versus  old  fracture deformity. Vertebral body heights and contours of the lumbar  spine are otherwise normal. No significant degenerative change.     DAGMAR ACEVEDO MD       Physical Exam  HENT:      Head: Normocephalic.   Pulmonary:      Effort: Pulmonary effort is normal.   Musculoskeletal:         General: Normal range of motion.   Neurological:      Mental Status: He is alert.      Comments: Gait intact.    Psychiatric:         Mood and Affect: Mood normal.         BILLING TIME DOCUMENTATION:   The total TIME spent on this patient on the date of the encounter/appointment was 64 minutes.      TOTAL TIME includes:   Time spent preparing to see the patient (reviewing records and tests)   Time spent face to face (or over the phone) with the patient   Time spent ordering tests, medications, procedures and referrals   Time spent Referring and communicating with other healthcare professionals   Time spent documenting clinical information in Epic

## 2023-11-15 NOTE — PROGRESS NOTES
11/15/23 7798   PEG: A Thee-Item Scale Assessing Pain Intensity and Interference        0 = No pain / No interference    10 = Pain as bad as you can imagine / Completely interferes   What number best describes your pain on average in the past week? 8   What number best describes how, during the past week, pain has interfered with your enjoyment of life? 9   What number best describes how, during the past week, pain has interfered with your general activity? 9   PEG Total Score 8.67

## 2023-11-30 ENCOUNTER — OFFICE VISIT (OUTPATIENT)
Dept: FAMILY MEDICINE | Facility: CLINIC | Age: 38
End: 2023-11-30
Payer: COMMERCIAL

## 2023-11-30 VITALS
HEART RATE: 83 BPM | WEIGHT: 285 LBS | RESPIRATION RATE: 24 BRPM | SYSTOLIC BLOOD PRESSURE: 154 MMHG | DIASTOLIC BLOOD PRESSURE: 99 MMHG | OXYGEN SATURATION: 98 % | HEIGHT: 70 IN | TEMPERATURE: 97.8 F | BODY MASS INDEX: 40.8 KG/M2

## 2023-11-30 DIAGNOSIS — F51.04 PSYCHOPHYSIOLOGICAL INSOMNIA: ICD-10-CM

## 2023-11-30 DIAGNOSIS — I10 BENIGN ESSENTIAL HYPERTENSION: Primary | ICD-10-CM

## 2023-11-30 PROCEDURE — 99213 OFFICE O/P EST LOW 20 MIN: CPT | Performed by: PHYSICIAN ASSISTANT

## 2023-11-30 RX ORDER — ATENOLOL AND CHLORTHALIDONE TABLET 50; 25 MG/1; MG/1
1 TABLET ORAL DAILY
Qty: 90 TABLET | Refills: 0 | Status: SHIPPED | OUTPATIENT
Start: 2023-11-30 | End: 2024-02-12

## 2023-11-30 RX ORDER — TRAZODONE HYDROCHLORIDE 50 MG/1
50-100 TABLET, FILM COATED ORAL AT BEDTIME
Qty: 60 TABLET | Refills: 2 | Status: SHIPPED | OUTPATIENT
Start: 2023-11-30 | End: 2023-12-26

## 2023-11-30 ASSESSMENT — PAIN SCALES - GENERAL: PAINLEVEL: EXTREME PAIN (8)

## 2023-11-30 NOTE — COMMUNITY RESOURCES LIST (ENGLISH)
11/30/2023   [facility]  N/A  For questions about this resource list or additional care needs, please contact your primary care clinic or care manager.  Phone: [phone]   Email: N/A   Address: [address] [place] [address]   Hours: N/A        Financial Stability       Rent and mortgage payment assistance  1  [employer] [facility] [employer] Distance: 4 miles      Phone/Virtual   [address] [place] [address]  Language: English  Hours: Mon - Fri 9:00 AM - 5:00 PM  Fees: Free   Phone: (640) 258-2605 Email: osotabqgl2stxdwtg@UpCompany.Gold Capital Website: https://rvohoclpx4llzuftf.org/     2  [facility] [employer] [facility] ([facility]-) Distance: 7.76 miles      In-Person   [address] [place] [address]  Language: English  Hours: Mon - Fri 8:00 AM - 4:00 PM  Fees: Free   Phone: (282) 200-6190 Email: info@[facility].StartSpanish Website: https://buildabrand.StartSpanish/          Food and Nutrition       Food pantry  3  [facility] ([facility] Food Shelf) Distance: 2.31 miles      Pickup   [address] [place] [address]  Language: English  Hours: Mon 10:00 AM - 3:00 PM , Tue 12:00 PM - 5:00 PM , Wed 10:00 AM - 3:00 PM , Thu 2:00 PM - 7:00 PM  Fees: Free   Phone: (425) 719-8794 Email: support@Gura Gearf.org Website: http://Gura Gearf.org     4  [facility] - Family Table Meals - [facility] Food Shelf Distance: 5.12 miles      Pickup   [address] [place] [address]  Language: English  Hours: Wed 12:00 PM - 2:00 PM  Fees: Free   Phone: (200) 927-7578 Email: doug@Maimai.StartSpanish Website: http://www.[facility].org     SNAP application assistance  5  Second [facility] Distance: 7.82 miles      Phone/Virtual   [address] [place] [address]  Language: English, Lithuanian  Hours: Mon 9:00 AM - 1:00 PM , Tue - Thu 9:00 AM - 4:00 PM , Fri 9:00 AM - 1:00 PM  Fees: Free   Phone: (399)  642-8725 Email: info@Inforama.org Website: http://www.Inforama.org/     6  [facility] Distance: 8.49 miles      In-Person, Phone/Virtual   [address] [place] [address]  Language: American Sign Language, English  Hours: Mon - Fri 8:00 AM - 4:00 PM  Fees: Free   Phone: (792) 794-9049 Email: billie@[facility] Website: https://www.North RoseSCIO Health Analytics/residents#[facility]services     Soup kitchen or free meals  7  [facility] Meals Distance: 0.89 miles      In-Person   [address] [place] [address]  Language: English  Hours: Wed 5:30 PM - 6:15 PM  Fees: Free   Phone: (371) 781-3866 Email: mike@[facility] FacilityPufettoLondonEmulis Website: http://www.[facility] FacilityPufettoLondon.Couple/     8  [facility] Family Table Meals - Family Table Meal Distance: 5.12 miles      [facility]   [address] [place] [address]  Language: English  Hours: Thu 5:00 PM - 6:30 PM  Fees: Free   Phone: (540) 192-5354 Email: doug@[employer]Builder Website: http://www.Grassroots Business FundLiquidSpace.org          Important Numbers & Websites       Emergency Services   911  [facility]   311  Poison Control   (418) 783-4855  Suicide Prevention Lifeline   (123) 503-4014 (TALK)  Child Abuse Hotline   (132) 795-3861 (4-A-Child)  Sexual Assault Hotline   (479) 467-2092 (HOPE)  National Runaway Safeline   (306) 202-1967 (RUNAWAY)  All-Options Talkline   (127) 996-5968  Substance Abuse Referral   (657) 331-9351 (HELP)

## 2023-11-30 NOTE — PROGRESS NOTES
"    Subjective   Shahram is a 38 year old, presenting for the following health issues:  Recheck Medication and Health Maintenance (Patient declined vaccines today)        11/30/2023     2:31 PM   Additional Questions   Roomed by Claudia Fernando CMA   Accompanied by None         11/30/2023     2:31 PM   Patient Reported Additional Medications   Patient reports taking the following new medications none       History of Present Illness       Reason for visit:  Med check    He eats 2-3 servings of fruits and vegetables daily.He consumes 2 sweetened beverage(s) daily.He exercises with enough effort to increase his heart rate 10 to 19 minutes per day.  He exercises with enough effort to increase his heart rate 6 days per week. He is missing 2 dose(s) of medications per week.     Forgot todays morning dose of lotrel.  No chest pain/sob/palpitations/dizziness/ha's  Some insomnia issues. History of success with trazodone.   Review of Systems   Constitutional, HEENT, cardiovascular, pulmonary, GI, , musculoskeletal, neuro, skin, endocrine and psych systems are negative, except as otherwise noted.      Objective    BP (!) 154/99   Pulse 83   Temp 97.8  F (36.6  C) (Temporal)   Resp 24   Ht 1.772 m (5' 9.75\")   Wt 129.3 kg (285 lb)   SpO2 98%   BMI 41.19 kg/m    Body mass index is 41.19 kg/m .  Physical Exam   Eye exam - right eye normal lid, conjunctiva, cornea, pupil and fundus, left eye normal lid, conjunctiva, cornea, pupil and fundus.  Thyroid not palpable, not enlarged, no nodules detected.  CHEST:chest clear to IPPA, no tachypnea, retractions or cyanosis, and S1, S2 normal, no murmur, no gallop, rate regular.      Shahram was seen today for recheck medication and health maintenance.    Diagnoses and all orders for this visit:    Benign essential hypertension  -     atenolol-chlorthalidone (TENORETIC) 50-25 MG tablet; Take 1 tablet by mouth daily    Psychophysiological insomnia  -     traZODone (DESYREL) 50 MG " tablet; Take 1-2 tablets ( mg) by mouth at bedtime    Other orders  -     REVIEW OF HEALTH MAINTENANCE PROTOCOL ORDERS      Continue lotrel and add in tenoretic.  work on lifestyle modification  Recheck in 2-3 mos

## 2023-11-30 NOTE — COMMUNITY RESOURCES LIST (ENGLISH)
11/30/2023   [facility]  N/A  For additional resource needs, please contact your health insurance member services or your primary care team.  Phone: [phone]   Email: N/A   Address: [address] [place] [address]   Hours: N/A        Financial Stability       Rent and mortgage payment assistance  1  [employer] [facility] [employer] Distance: 4 miles      Phone/Virtual   [address] [place] [address]  Language: English  Hours: Mon - Fri 9:00 AM - 5:00 PM  Fees: Free   Phone: (749) 693-5261 Email: zptiydzwd2zjgsjaz@Brisk.io.Liberata Website: https://gppoliggi8mvkwoat.org/     2  [facility] ([facility]) Distance: 7.76 miles      In-Person   [address] [place] [address]  Language: English  Hours: Mon - Fri 8:00 AM - 4:00 PM  Fees: Free   Phone: (397) 731-9813 Email: info@[facility].Breather Website: https://Fluidinfo.Breather/          Food and Nutrition       Food pantry  3  [facility] ([facility] Food Shelf) Distance: 2.31 miles      Pickup   [address] [place] [address]  Language: English  Hours: Mon 10:00 AM - 3:00 PM , Tue 12:00 PM - 5:00 PM , Wed 10:00 AM - 3:00 PM , Thu 2:00 PM - 7:00 PM  Fees: Free   Phone: (765) 102-1314 Email: support@Tek TravelsbcBluwan.org Website: http://ANT Farm.org     4  [facility] - Family Table Meals - [facility] Food [facility] Distance: 5.12 miles      Pickup   [address] [place] [address]  Language: English  Hours: Wed 12:00 PM - 2:00 PM  Fees: Free   Phone: (352) 304-1068 Email: doug@[facility].Breather Website: http://www.[facility].org     SNAP application assistance  5  [employer] [place] Distance: 22.37 miles      Phone/Virtual   [address] [address]  Language: English, Hmong, Honduran, Swiss, Georgian  Hours: Mon - Fri 8:30 AM - 4:30 PM  Fees: Free   Phone: (841) 723-7465 Email: helpline@RadarChile.org  Website: https://www.hungersolutions.org/programs/[place]-food-helpline/     6  Second [facility] Distance: 7.82 miles      Phone/Virtual   [address] [place] [address]  Language: English, Citizen of [place]  Hours: Mon 9:00 AM - 1:00 PM , Tue - Thu 9:00 AM - 4:00 PM , Fri 9:00 AM - 1:00 PM  Fees: Free   Phone: (867) 877-7360 Email: info@Greystone.Urban Cargo Website: http://www.Greystone.org/     Soup kitchen or free meals  7  [facility] - [facility] Meals Distance: 0.89 miles      In-Person   [address] [place] [address]  Language: English  Hours: Wed 5:30 PM - 6:15 PM  Fees: Free   Phone: (911) 651-3174 Email: mike@[facility] PartnersSmashFlyHartfordEgully Website: http://www.[facility] PartnersSm[facility]FlyHartford.Urban Cargo/     8  [facility] Family Table Meals - Family Table Meal Distance: 5.12 miles      [facility]   [address] [place] [address]  Language: English  Hours: Thu 5:00 PM - 6:30 PM  Fees: Free   Phone: (410) 767-5733 Email: doug@[facility].Urban Cargo Website: http://www.[facility].org          Important Numbers & Websites       [address].org  Poison Control   (908) 668-3913 [place]poison.[place]  Suicide and Crisis Lifeline   [address] [place], [place] [address]line.org  Childhelp National Child Abuse Hotline   509.720.5239 Childhelphotline.org  National Sexual Assault Hotline   (801) 167-5399 (HOPE) Rainn.org  National Runaway Safeline   (519) 611-9641 (RUNAWAY) [facility].org  Pregnancy & Postpartum Support [place]   Call/text 828-579-9764 Ppsupportmn.org  Substance Abuse National Helpline ([facility]   028-600-HELP (5896) Findtreatment.gov  Emergency Services   911

## 2023-12-19 ENCOUNTER — ANCILLARY PROCEDURE (OUTPATIENT)
Dept: MRI IMAGING | Facility: CLINIC | Age: 38
End: 2023-12-19
Attending: PHYSICIAN ASSISTANT
Payer: COMMERCIAL

## 2023-12-19 DIAGNOSIS — G89.29 CHRONIC PAIN OF BOTH KNEES: ICD-10-CM

## 2023-12-19 DIAGNOSIS — M25.562 CHRONIC PAIN OF BOTH KNEES: ICD-10-CM

## 2023-12-19 DIAGNOSIS — G89.29 CHRONIC MIDLINE LOW BACK PAIN WITHOUT SCIATICA: ICD-10-CM

## 2023-12-19 DIAGNOSIS — M54.50 CHRONIC MIDLINE LOW BACK PAIN WITHOUT SCIATICA: ICD-10-CM

## 2023-12-19 DIAGNOSIS — M25.561 CHRONIC PAIN OF BOTH KNEES: ICD-10-CM

## 2023-12-19 PROCEDURE — 73721 MRI JNT OF LWR EXTRE W/O DYE: CPT | Mod: LT | Performed by: RADIOLOGY

## 2023-12-19 PROCEDURE — 72148 MRI LUMBAR SPINE W/O DYE: CPT | Mod: GC | Performed by: RADIOLOGY

## 2023-12-22 ENCOUNTER — TELEPHONE (OUTPATIENT)
Dept: FAMILY MEDICINE | Facility: CLINIC | Age: 38
End: 2023-12-22
Payer: COMMERCIAL

## 2023-12-22 NOTE — LETTER
January 5, 2024      Shahram Young  [address] [facility] [address]        Dear Shahram,       We have tried multiple times to reach you but have been unsuccessful.  Please call our clinic at phone [phone] as soon as possible to schedule an appointment with Severo Armendariz for a follow up on your blood pressure and to discuss your recent MRI.       Sincerely,        Severo Armendariz's Care Team

## 2023-12-22 NOTE — TELEPHONE ENCOUNTER
----- Message from Severo Armendariz PA-C sent at 12/22/2023  8:14 AM CST -----  Help sidney schedule a visit with me to recheck his blood pressure and to discuss his mri results and potential treatment options .

## 2023-12-23 DIAGNOSIS — F51.04 PSYCHOPHYSIOLOGICAL INSOMNIA: ICD-10-CM

## 2023-12-26 RX ORDER — TRAZODONE HYDROCHLORIDE 50 MG/1
50-100 TABLET, FILM COATED ORAL AT BEDTIME
Qty: 180 TABLET | Refills: 0 | Status: SHIPPED | OUTPATIENT
Start: 2023-12-26 | End: 2024-03-28

## 2024-01-10 ENCOUNTER — OFFICE VISIT (OUTPATIENT)
Dept: PALLIATIVE MEDICINE | Facility: CLINIC | Age: 39
End: 2024-01-10
Payer: COMMERCIAL

## 2024-01-10 VITALS — DIASTOLIC BLOOD PRESSURE: 100 MMHG | HEART RATE: 85 BPM | SYSTOLIC BLOOD PRESSURE: 157 MMHG

## 2024-01-10 DIAGNOSIS — G89.29 CHRONIC MIDLINE LOW BACK PAIN WITHOUT SCIATICA: ICD-10-CM

## 2024-01-10 DIAGNOSIS — M54.50 CHRONIC MIDLINE LOW BACK PAIN WITHOUT SCIATICA: ICD-10-CM

## 2024-01-10 DIAGNOSIS — G89.29 CHRONIC NONINTRACTABLE HEADACHE, UNSPECIFIED HEADACHE TYPE: ICD-10-CM

## 2024-01-10 DIAGNOSIS — G89.4 CHRONIC PAIN SYNDROME: Primary | ICD-10-CM

## 2024-01-10 DIAGNOSIS — M25.561 CHRONIC PAIN OF BOTH KNEES: ICD-10-CM

## 2024-01-10 DIAGNOSIS — G89.29 CHRONIC PAIN OF BOTH KNEES: ICD-10-CM

## 2024-01-10 DIAGNOSIS — M54.2 NECK PAIN: ICD-10-CM

## 2024-01-10 DIAGNOSIS — M54.10 RADICULAR LEG PAIN: ICD-10-CM

## 2024-01-10 DIAGNOSIS — R10.32 LLQ ABDOMINAL PAIN: ICD-10-CM

## 2024-01-10 DIAGNOSIS — R51.9 CHRONIC NONINTRACTABLE HEADACHE, UNSPECIFIED HEADACHE TYPE: ICD-10-CM

## 2024-01-10 DIAGNOSIS — M79.18 MYOFASCIAL PAIN: ICD-10-CM

## 2024-01-10 DIAGNOSIS — M25.562 CHRONIC PAIN OF BOTH KNEES: ICD-10-CM

## 2024-01-10 PROCEDURE — 99214 OFFICE O/P EST MOD 30 MIN: CPT

## 2024-01-10 RX ORDER — PREGABALIN 75 MG/1
75 CAPSULE ORAL 2 TIMES DAILY
Qty: 60 CAPSULE | Refills: 1 | Status: SHIPPED | OUTPATIENT
Start: 2024-01-10 | End: 2024-03-28

## 2024-01-10 ASSESSMENT — PAIN SCALES - GENERAL: PAINLEVEL: SEVERE PAIN (6)

## 2024-01-10 NOTE — PATIENT INSTRUCTIONS
Pain Physical Therapy:  YES  - referred at last visit. Recommend calling to schedule: [phone]     Pain Psychologist to address relaxation, behavioral change, coping style, and other factors important to improvement.  NO - may consider referral in future if needed.      Diagnostic Studies:  Lumbar MRI ordered by PCP completed in between visits - multilevel degenerative changes noted, no red flag findings or high grade stenoses.      Medication Management:   Increase Lyrica to 75 mg twice daily. Reports some degree of benefit with 50 mg twice daily. No concerns for side effects. Advised to monitor for changes in pain level/intensity. Updated prescription sent into pharmacy today.   Monitor for sedation - may cause dizziness or drowsiness. Be careful driving/moving around until you know effects of medication. Avoid concurrent alcohol use.      Potential procedures:   Deferred - may consider procedures in future, pending out with pain PT and Lyrica.      Other Orders/Referrals:   Addiction Medicine referral - he is currently on methadone for opioid/heroin use disorder through [facility]. He is interested in exploring possible transition from methadone to Suboxone. Call to schedule appointment: [phone].   Blood pressure - recommend follow up with primary care provider to address elevated blood pressure at last visit and again today. He is asymptomatic today. Advised to continue following with PCP for BP control.      Follow up with NACHO Ramos CNP in 8-10 weeks     ----------------------------------------------------------------  Clinic Number:  [phone]   Call with any questions about your care and for scheduling assistance.   Calls are returned Monday through Friday between 8 AM and 4:30 PM. We usually get back to you within 2 business days depending on the issue/request.    If we are prescribing your medications:  For opioid medication refills, call the clinic or send a [email or web address] message 7  days in advance.  Please include:  Name of requested medication  Name of the pharmacy.  For non-opioid medications, call your pharmacy directly to request a refill. Please allow 3-4 days to be processed.   Per [place] State Law:  All controlled substance prescriptions must be filled within 30 days of being written.    For those controlled substances allowing refills, pickup must occur within 30 days of last fill.      We believe regular attendance is key to your success in our program!    Any time you are unable to keep your appointment we ask that you call us at least 24 hours in advance to cancel.This will allow us to offer the appointment time to another patient.   Multiple missed appointments may lead to dismissal from the clinic.

## 2024-01-10 NOTE — PROGRESS NOTES
01/10/24 1611   PEG: A Thee-Item Scale Assessing Pain Intensity and Interference        0 = No pain / No interference    10 = Pain as bad as you can imagine / Completely interferes   What number best describes how, during the past week, pain has interfered with your enjoyment of life? 9   What number best describes how, during the past week, pain has interfered with your general activity? 8

## 2024-01-10 NOTE — PROGRESS NOTES
[facility] Pain Management     Date of visit: 1/10/2024      Assessment:   Shahram Young is a 38 year old male with a past medical history significant for chronic abdominal pain, tobacco use, polysub use, depression/anxiety/ptsd, HTN, s/p left knee ligament repair in 2012, who presents with complaints of widespread pain, referred for low back and knee pain.      Widespread pain - onset of pain in adolescence, with progressive worsening over time. He has hx of left knee surgery and right foot surgery, s/p partial left sided colectomy. Of note, he also has hx of polysubstance use, including opioid/heroin and is currently on methadone therapy. No recent advanced lumbar imaging. Etiology of widespread pain is likely multifactorial though not entirely clear, suspect underlying degenerative spine changes contributing to an extent, suspect central sensitization component, with overlying myofascial component.      Assigned to [facility] nursing team.     Visit Diagnoses:  1. Chronic pain syndrome    2. Chronic pain of both knees    3. Chronic midline low back pain without sciatica    4. Neck pain    5. Chronic nonintractable headache, unspecified headache type    6. Radicular leg pain    7. LLQ abdominal pain    8. Myofascial pain        Plan:  Diagnosis reviewed, treatment option addressed, and risk/benifits discussed.  Self-care instructions given.  I am recommending a multidisciplinary treatment plan to help this patient better manage their pain.      Pain Physical Therapy:  YES  - referred at last visit. Recommend calling to schedule: [phone]     Pain Psychologist to address relaxation, behavioral change, coping style, and other factors important to improvement.  NO - may consider referral in future if needed.      Diagnostic Studies:  Lumbar MRI ordered by PCP completed in between visits - multilevel degenerative changes noted, no red flag findings or high grade stenoses.      Medication Management:    Increase Lyrica to 75 mg twice daily. Reports some degree of benefit with 50 mg twice daily. No concerns for side effects. Advised to monitor for changes in pain level/intensity. Updated prescription sent into pharmacy today.   Monitor for sedation - may cause dizziness or drowsiness. Be careful driving/moving around until you know effects of medication. Avoid concurrent alcohol use.      Potential procedures:   Deferred - may consider procedures in future, pending out with pain PT and Lyrica.      Other Orders/Referrals:   Addiction Medicine referral - he is currently on methadone for opioid/heroin use disorder through [facility]. He is interested in exploring possible transition from methadone to Suboxone. Call to schedule appointment: [phone].   Blood pressure - recommend follow up with primary care provider to address elevated blood pressure at last visit and again today. He is asymptomatic today. Advised to continue following with PCP for BP control.      Follow up with NACHO Ramos CNP in 8-10 weeks       Review of Electronic Chart: Today I have also reviewed available medical information in the patient's medical record at [facility] and Care Everywhere (if available), including relevant provider notes, laboratory work, and imaging.     Loyda Byrd DNP, NACHO, AGNP-C  [facility] Pain Management     -------------------------------------------------    Subjective:    Chief complaint:   Chief Complaint   Patient presents with    Pain       Interval history:  Shahram Young is a 38 year old male last seen on 11/15/23.  They are a patient of mine seen in follow up.     Recommendations/plan at the last visit included:  Pain Physical Therapy:  YES   I am referring for targeting stretching, strengthening, and home exercise plan to support functional goals/ADLs.  If you have not been contacted to schedule within 2 business days, please call [phone] to make an appointment.       Pain Psychologist to address relaxation, behavioral change, coping style, and other factors important to improvement.  NO - may consider referral in future if needed.      Diagnostic Studies:  Bilateral knee and lumbar MRIs ordered 6/28/23 by PCP, not yet completed. Advised to complete MRIs in between visits.      Medication Management:   Increase Lyrica to 50 mg twice daily. Monitor for changes in baseline pain levels and intensity of fluctuations. Pending outcome with initial dosing, may consider further increase at follow up. Updated prescription sent into pharmacy today.   Monitor for sedation - may cause dizziness or drowsiness. Be careful driving/moving around until you know effects of medication. Avoid concurrent alcohol use.      Potential procedures:   Deferred - may consider procedures in future, pending MRI results and outcome with initial therapies.      Other Orders/Referrals:   Addiction Medicine referral - he is currently on methadone for opioid/heroin use disorder through [facility]. He is interested in exploring possible transition from methadone to Suboxone.   Blood pressure - recommend follow up with primary care provider to address elevated blood pressure. He is asymptomatic today.      Follow up with NACHO Ramos CNP in 6-8 weeks     Since his last visit, Shahram oYung reports:  -He reports pain is slightly better than last visit.   -He reports quitting job on Monday, had expressed concerns at initial visit for physical demands of job.   -He was referred to pain PT at last visit. He did not get scheduled in between visits, states he never got a call.   -He completed lumbar MRIs in between visit, ordered by PCP.   -He reports some improvement in pain with Lyrica increase recommended at last visit, current dose 50 mg BID. He reports missing doses occasionally, does not readily notice significant change in pain, but he notes it may be missing a dose here or there but not multiple  doses in a row usually.  No concerns for side effects.   -He is open to trial increasing dosage of Lyrica.   -    Pain Information:   Pain rating: averages 6/10 on a 0-10 scale.      HPI from initial visit with me on 11/15/23:  Shahram Young is a 38 year old male presents with a chief complaint of widespread pain - headaches, neck, abdominal, low back and knees (dx assoc with referral), BLE (states this is the worse).      The pain has been present for since he was teenager, states he broke multiple bones and that was onset of pain.    The pain is Severe Pain (7) in severity.    The pain is described as constant, dull aching and sharp, fluctuating intensity. He reports neck pain is numb and burning prominent for the past month.   The pain is alleviated by lying down/rest, Lyrica and meloxicam, heat/ice, stretching/getting up and moving around, TENS unit.    It is exacerbated by - worse in the morning, then gets better after he gets up and moving, then notices increased pain later on in the day after work.  Stairs exacerbate pain, as well as getting in/out of cars particularly smaller cars.   Modalities that have been utilized in the past which were helpful include PT (referred after injury/surgery), medications (mixed benefit).    Things that were not helpful, but tried ,include injections (not able to recall specifics, not sure what and how helpful), medications (mixed benefit).    The patient has never tried pain PT, spinal injections.  The patient otherwise denies bowel or bladder incontinence, parasthesias, weakness, saddle anesthesia, unintentional weight loss, or fever/chills/sweats.   Shahram Young has not been seen at a pain clinic in the past.       -He follows with methadone clinic for opioid and heroin use disorder, goes to [place], states current dose is 52 mg/day.   -He is interested in possible transition from methadone to Suboxone, would like referral to addiction medicine.   -He works full time  "at the [employer], he moves cars and some physical demands.   -He does drink \"a little bit\" of alcohol, states in the last couple of week since breaking up with girlfriend. States it is not every day, but when he does he may drink up to 1/2 bottle of whiskey.         Current Pain Treatments:    Medications:               Lyrica 75 mg BID               Meloxicam 15 mg daily (PCP)     Methadone - 52 mg/day (follows with [facility])      2. Other therapies:               Pain PT - advised today to call to schedule               Addiction medicine - referred at last visit, interested in transitioning from methadone to Suboxone.     Current MME: 0    Review of [place] Prescription Monitoring Program (): No concern for abuse or misuse of controlled medications based on this report. Reviewed - appears appropriate.     Past pain treatments:      Medications:  Current Outpatient Medications   Medication Sig Dispense Refill    acamprosate (CAMPRAL) 333 MG EC tablet Take 2 tablets (666 mg) by mouth 3 times daily 180 tablet 2    amLODIPine-benazepril (LOTREL) 10-40 MG capsule TAKE 1 CAPSULE BY MOUTH EVERY DAY 90 capsule 0    atenolol (TENORMIN) 50 MG tablet Take 1 tablet (50 mg) by mouth daily 45 tablet 0    atenolol-chlorthalidone (TENORETIC) 50-25 MG tablet Take 1 tablet by mouth daily 90 tablet 0    chlorthalidone (HYGROTON) 25 MG tablet Take 1 tablet (25 mg) by mouth daily 45 tablet 0    meloxicam (MOBIC) 15 MG tablet Take 1 tablet (15 mg) by mouth daily 60 tablet 1    methadone (DOLPHINE) 5 MG/5ML solution Take 55 mg by mouth daily      pregabalin (LYRICA) 75 MG capsule Take 1 capsule (75 mg) by mouth 2 times daily 60 capsule 1    traZODone (DESYREL) 50 MG tablet TAKE 1-2 TABLETS BY MOUTH AT BEDTIME. 180 tablet 0       Medical History: any changes in medical history since they were last seen? No      Objective:    Physical Exam:  Blood pressure (!) 157/100, pulse 85.  Constitutional: Well developed, well nourished, " appears stated age.  Gait: Intact   HEENT: Head atraumatic, normocephalic. Eyes without conjunctival injection or jaundice. Oropharynx clear. Neck supple. No obvious neck masses.  Skin: No rash, lesions, or petechiae of exposed skin.   Psychiatric/mental status: Alert, without lethargy or stupor. Speech fluent. Appropriate affect. Mood normal. Able to follow commands without difficulty.     Diagnostic Tests/Imaging/Labs:  CMP 9/30/23 - renal and hepatic function intact, GFR >60 (>60 NL with lab where test was completed)    BILLING TIME DOCUMENTATION:   The total TIME spent on this patient on the date of the encounter/appointment was 32 minutes.      TOTAL TIME includes:   Time spent preparing to see the patient (reviewing records and tests)   Time spent face to face (or over the phone) with the patient   Time spent ordering tests, medications, procedures and referrals   Time spent Referring and communicating with other healthcare professionals   Time spent documenting clinical information in Epic

## 2024-02-12 ENCOUNTER — OFFICE VISIT (OUTPATIENT)
Dept: FAMILY MEDICINE | Facility: CLINIC | Age: 39
End: 2024-02-12
Payer: COMMERCIAL

## 2024-02-12 VITALS
WEIGHT: 273.8 LBS | OXYGEN SATURATION: 97 % | HEART RATE: 82 BPM | TEMPERATURE: 97.9 F | SYSTOLIC BLOOD PRESSURE: 138 MMHG | DIASTOLIC BLOOD PRESSURE: 82 MMHG | HEIGHT: 70 IN | RESPIRATION RATE: 20 BRPM | BODY MASS INDEX: 39.2 KG/M2

## 2024-02-12 DIAGNOSIS — F41.1 GAD (GENERALIZED ANXIETY DISORDER): ICD-10-CM

## 2024-02-12 DIAGNOSIS — F10.90 ALCOHOL USE DISORDER: ICD-10-CM

## 2024-02-12 DIAGNOSIS — Z11.3 SCREEN FOR STD (SEXUALLY TRANSMITTED DISEASE): ICD-10-CM

## 2024-02-12 DIAGNOSIS — I10 BENIGN ESSENTIAL HYPERTENSION: Primary | ICD-10-CM

## 2024-02-12 DIAGNOSIS — F15.10 METHAMPHETAMINE USE DISORDER, MILD (H): ICD-10-CM

## 2024-02-12 DIAGNOSIS — N34.2 URETHRITIS: ICD-10-CM

## 2024-02-12 DIAGNOSIS — R30.0 DYSURIA: ICD-10-CM

## 2024-02-12 DIAGNOSIS — Z11.4 SCREENING FOR HIV (HUMAN IMMUNODEFICIENCY VIRUS): ICD-10-CM

## 2024-02-12 LAB
ALBUMIN SERPL BCG-MCNC: 4.3 G/DL (ref 3.5–5.2)
ALBUMIN UR-MCNC: NEGATIVE MG/DL
ALP SERPL-CCNC: 69 U/L (ref 40–150)
ALT SERPL W P-5'-P-CCNC: 85 U/L (ref 0–70)
ANION GAP SERPL CALCULATED.3IONS-SCNC: 12 MMOL/L (ref 7–15)
APPEARANCE UR: CLEAR
AST SERPL W P-5'-P-CCNC: 66 U/L (ref 0–45)
BACTERIA #/AREA URNS HPF: NORMAL /HPF
BILIRUB SERPL-MCNC: 0.3 MG/DL
BILIRUB UR QL STRIP: NEGATIVE
BUN SERPL-MCNC: 27.1 MG/DL (ref 6–20)
CALCIUM SERPL-MCNC: 9.3 MG/DL (ref 8.6–10)
CHLORIDE SERPL-SCNC: 100 MMOL/L (ref 98–107)
COLOR UR AUTO: YELLOW
CREAT SERPL-MCNC: 0.98 MG/DL (ref 0.67–1.17)
DEPRECATED HCO3 PLAS-SCNC: 25 MMOL/L (ref 22–29)
EGFRCR SERPLBLD CKD-EPI 2021: >90 ML/MIN/1.73M2
GLUCOSE SERPL-MCNC: 118 MG/DL (ref 70–99)
GLUCOSE UR STRIP-MCNC: NEGATIVE MG/DL
HCV AB SERPL QL IA: NONREACTIVE
HGB UR QL STRIP: ABNORMAL
HIV 1+2 AB+HIV1 P24 AG SERPL QL IA: NONREACTIVE
KETONES UR STRIP-MCNC: NEGATIVE MG/DL
LEUKOCYTE ESTERASE UR QL STRIP: NEGATIVE
NITRATE UR QL: NEGATIVE
PH UR STRIP: 5.5 [PH] (ref 5–7)
POTASSIUM SERPL-SCNC: 3.7 MMOL/L (ref 3.4–5.3)
PROT SERPL-MCNC: 7.1 G/DL (ref 6.4–8.3)
RBC #/AREA URNS AUTO: NORMAL /HPF
SODIUM SERPL-SCNC: 137 MMOL/L (ref 135–145)
SP GR UR STRIP: 1.01 (ref 1–1.03)
UROBILINOGEN UR STRIP-ACNC: 0.2 E.U./DL
WBC #/AREA URNS AUTO: NORMAL /HPF

## 2024-02-12 PROCEDURE — 87591 N.GONORRHOEAE DNA AMP PROB: CPT | Performed by: PHYSICIAN ASSISTANT

## 2024-02-12 PROCEDURE — 36415 COLL VENOUS BLD VENIPUNCTURE: CPT | Performed by: PHYSICIAN ASSISTANT

## 2024-02-12 PROCEDURE — 87389 HIV-1 AG W/HIV-1&-2 AB AG IA: CPT | Performed by: PHYSICIAN ASSISTANT

## 2024-02-12 PROCEDURE — 86695 HERPES SIMPLEX TYPE 1 TEST: CPT | Performed by: PHYSICIAN ASSISTANT

## 2024-02-12 PROCEDURE — 80053 COMPREHEN METABOLIC PANEL: CPT | Performed by: PHYSICIAN ASSISTANT

## 2024-02-12 PROCEDURE — 87491 CHLMYD TRACH DNA AMP PROBE: CPT | Performed by: PHYSICIAN ASSISTANT

## 2024-02-12 PROCEDURE — 86780 TREPONEMA PALLIDUM: CPT | Performed by: PHYSICIAN ASSISTANT

## 2024-02-12 PROCEDURE — 86803 HEPATITIS C AB TEST: CPT | Performed by: PHYSICIAN ASSISTANT

## 2024-02-12 PROCEDURE — 86696 HERPES SIMPLEX TYPE 2 TEST: CPT | Performed by: PHYSICIAN ASSISTANT

## 2024-02-12 PROCEDURE — 99214 OFFICE O/P EST MOD 30 MIN: CPT | Performed by: PHYSICIAN ASSISTANT

## 2024-02-12 PROCEDURE — 81001 URINALYSIS AUTO W/SCOPE: CPT | Performed by: PHYSICIAN ASSISTANT

## 2024-02-12 RX ORDER — DOXYCYCLINE HYCLATE 100 MG
100 TABLET ORAL 2 TIMES DAILY
Qty: 20 TABLET | Refills: 0 | Status: SHIPPED | OUTPATIENT
Start: 2024-02-12 | End: 2024-02-22

## 2024-02-12 RX ORDER — PROPRANOLOL HCL 60 MG
60 CAPSULE, EXTENDED RELEASE 24HR ORAL DAILY
Qty: 45 CAPSULE | Refills: 0 | Status: SHIPPED | OUTPATIENT
Start: 2024-02-12 | End: 2024-03-22

## 2024-02-12 RX ORDER — ESCITALOPRAM OXALATE 5 MG/1
5 TABLET ORAL DAILY
Qty: 45 TABLET | Refills: 0 | Status: SHIPPED | OUTPATIENT
Start: 2024-02-12 | End: 2024-03-22

## 2024-02-12 RX ORDER — TOPIRAMATE 25 MG/1
TABLET, FILM COATED ORAL
Qty: 180 TABLET | Refills: 1 | Status: SHIPPED | OUTPATIENT
Start: 2024-02-12 | End: 2024-03-28

## 2024-02-12 ASSESSMENT — PAIN SCALES - GENERAL: PAINLEVEL: SEVERE PAIN (7)

## 2024-02-12 ASSESSMENT — PATIENT HEALTH QUESTIONNAIRE - PHQ9
SUM OF ALL RESPONSES TO PHQ QUESTIONS 1-9: 27
SUM OF ALL RESPONSES TO PHQ QUESTIONS 1-9: 27
10. IF YOU CHECKED OFF ANY PROBLEMS, HOW DIFFICULT HAVE THESE PROBLEMS MADE IT FOR YOU TO DO YOUR WORK, TAKE CARE OF THINGS AT HOME, OR GET ALONG WITH OTHER PEOPLE: EXTREMELY DIFFICULT

## 2024-02-12 NOTE — PROGRESS NOTES
"`     Subjective   Shahram is a 38 year old, presenting for the following health issues:  RECHECK, Urinary Problem (Penis is painful, burning during intercourse/1 month/Getting worse), and Health Maintenance (Patient declined vaccines today)        2/12/2024    10:27 AM   Additional Questions   Roomed by Claudia Fernando CMA   Accompanied by None         2/12/2024    10:27 AM   Patient Reported Additional Medications   Patient reports taking the following new medications none     History of Present Illness       Reason for visit:  Reproducttive health  Symptom onset:  More than a month  Symptoms include:  Burning penis/urethra  Symptom intensity:  Mild  Symptom progression:  Staying the same  Had these symptoms before:  No  What makes it worse:  Na  What makes it better:  Na    He eats 2-3 servings of fruits and vegetables daily.He consumes 2 sweetened beverage(s) daily.He exercises with enough effort to increase his heart rate 10 to 19 minutes per day.  He exercises with enough effort to increase his heart rate 3 or less days per week. He is missing 2 dose(s) of medications per week.       Recheck medication    Hasn't been taking blood pressure meds consistently.  Sober x 5 days.   Clean from meth use x 1 mos.     No purulent discharge.   Some urinary frequency.  History of ac. Is open to trying something daily for it.   Review of Systems  Constitutional, neuro, ENT, endocrine, pulmonary, cardiac, gastrointestinal, genitourinary, musculoskeletal, integument and psychiatric systems are negative, except as otherwise noted.      Objective    /82   Pulse 82   Temp 97.9  F (36.6  C) (Temporal)   Resp 20   Ht 1.778 m (5' 10\")   Wt 124.2 kg (273 lb 12.8 oz)   SpO2 97%   BMI 39.29 kg/m    Body mass index is 39.29 kg/m .  Physical Exam     Eye exam - right eye normal lid, conjunctiva, cornea, pupil and fundus, left eye normal lid, conjunctiva, cornea, pupil and fundus.  Thyroid not palpable, not enlarged, no " nodules detected.  CHEST:chest clear to IPPA, no tachypnea, retractions or cyanosis, and S1, S2 normal, no murmur, no gallop, rate regular.  The abdomen is soft without tenderness, guarding, mass, rebound or organomegaly. Bowel sounds are normal. No CVA tenderness or inguinal adenopathy noted.  Penis. No discharge . No deformity or evidence fracture.   No testicular tenderness. No masses.     Shahram was seen today for recheck, urinary problem and health maintenance.    Diagnoses and all orders for this visit:    Benign essential hypertension  -     propranolol ER (INDERAL LA) 60 MG 24 hr capsule; Take 1 capsule (60 mg) by mouth daily    Screening for HIV (human immunodeficiency virus)  -     HIV Antigen Antibody Combo; Future  -     HIV Antigen Antibody Combo    Alcohol use disorder  -     Comprehensive metabolic panel (BMP + Alb, Alk Phos, ALT, AST, Total. Bili, TP); Future  -     propranolol ER (INDERAL LA) 60 MG 24 hr capsule; Take 1 capsule (60 mg) by mouth daily  -     topiramate (TOPAMAX) 25 MG tablet; Take 1 tab at bedtime x 2 weeks, then increase to 1 tab bid thereafter  -     Comprehensive metabolic panel (BMP + Alb, Alk Phos, ALT, AST, Total. Bili, TP)    Methamphetamine use disorder, mild (H)    Dysuria  -     UA Macroscopic with reflex to Microscopic and Culture - Lab Collect; Future  -     Chlamydia trachomatis PCR; Future  -     Neisseria gonorrhoeae PCR; Future  -     UA Macroscopic with reflex to Microscopic and Culture - Lab Collect  -     Chlamydia trachomatis PCR  -     Neisseria gonorrhoeae PCR  -     UA Microscopic with Reflex to Culture  -     Urogenital Ureaplasma and Mycoplasma Species by PCR; Future    Screen for STD (sexually transmitted disease)  -     HIV Antigen Antibody Combo; Future  -     Hepatitis C Screen Reflex to HCV RNA Quant and Genotype; Future  -     Herpes Simplex Virus 1 and 2 IgG; Future  -     Treponema Abs w Reflex to RPR and Titer; Future  -     HIV Antigen Antibody  Combo  -     Hepatitis C Screen Reflex to HCV RNA Quant and Genotype  -     Herpes Simplex Virus 1 and 2 IgG  -     Treponema Abs w Reflex to RPR and Titer    JALEN (generalized anxiety disorder)  -     escitalopram (LEXAPRO) 5 MG tablet; Take 1 tablet (5 mg) by mouth daily    Urethritis  -     doxycycline hyclate (VIBRA-TABS) 100 MG tablet; Take 1 tablet (100 mg) by mouth 2 times daily for 10 days      Recheck in 1 mos   Signed Electronically by: Severo Armendariz PA-C

## 2024-02-13 LAB
C TRACH DNA SPEC QL NAA+PROBE: NEGATIVE
HSV1 IGG SERPL QL IA: 0.24 INDEX
HSV1 IGG SERPL QL IA: NORMAL
HSV2 IGG SERPL QL IA: 0.07 INDEX
HSV2 IGG SERPL QL IA: NORMAL
N GONORRHOEA DNA SPEC QL NAA+PROBE: NEGATIVE
T PALLIDUM AB SER QL: NONREACTIVE

## 2024-02-26 ENCOUNTER — THERAPY VISIT (OUTPATIENT)
Dept: PHYSICAL THERAPY | Facility: CLINIC | Age: 39
End: 2024-02-26
Payer: COMMERCIAL

## 2024-02-26 DIAGNOSIS — M79.18 MYOFASCIAL PAIN: ICD-10-CM

## 2024-02-26 DIAGNOSIS — G89.29 CHRONIC NONINTRACTABLE HEADACHE, UNSPECIFIED HEADACHE TYPE: ICD-10-CM

## 2024-02-26 DIAGNOSIS — M54.10 RADICULAR LEG PAIN: ICD-10-CM

## 2024-02-26 DIAGNOSIS — R10.32 LLQ ABDOMINAL PAIN: ICD-10-CM

## 2024-02-26 DIAGNOSIS — M54.50 CHRONIC MIDLINE LOW BACK PAIN WITHOUT SCIATICA: ICD-10-CM

## 2024-02-26 DIAGNOSIS — R51.9 CHRONIC NONINTRACTABLE HEADACHE, UNSPECIFIED HEADACHE TYPE: ICD-10-CM

## 2024-02-26 DIAGNOSIS — M25.561 CHRONIC PAIN OF BOTH KNEES: ICD-10-CM

## 2024-02-26 DIAGNOSIS — G89.29 CHRONIC MIDLINE LOW BACK PAIN WITHOUT SCIATICA: ICD-10-CM

## 2024-02-26 DIAGNOSIS — M25.562 CHRONIC PAIN OF BOTH KNEES: ICD-10-CM

## 2024-02-26 DIAGNOSIS — G89.29 CHRONIC PAIN OF BOTH KNEES: ICD-10-CM

## 2024-02-26 DIAGNOSIS — M54.2 NECK PAIN: ICD-10-CM

## 2024-02-26 PROCEDURE — 97161 PT EVAL LOW COMPLEX 20 MIN: CPT | Mod: GP | Performed by: PHYSICAL THERAPIST

## 2024-02-26 PROCEDURE — 97112 NEUROMUSCULAR REEDUCATION: CPT | Mod: GP | Performed by: PHYSICAL THERAPIST

## 2024-02-26 PROCEDURE — 97110 THERAPEUTIC EXERCISES: CPT | Mod: GP | Performed by: PHYSICAL THERAPIST

## 2024-02-26 NOTE — PROGRESS NOTES
"PHYSICAL THERAPY EVALUATION  Type of Visit: Evaluation    See electronic medical record for Abuse and Falls Screening details.    Subjective       Presenting condition or subjective complaint:  multiple joint pain, B knees, LBP, neck pain   Date of onset: 11/15/24    Relevant medical history:   Shahram Young is a 38 year old male with a past medical history significant for chronic abdominal pain, tobacco use, polysub use, depression/anxiety/ptsd, HTN, s/p left knee ligament repair in 2012, who presents with complaints of widespread pain, referred for low back and knee pain.      Widespread pain - onset of pain in adolescence, with progressive worsening over time. He has hx of left knee surgery and right foot surgery, s/p partial left sided colectomy. Of note, he also has hx of polysubstance use, including opioid/heroin and is currently on methadone therapy. No recent advanced lumbar imaging. Etiology of widespread pain is likely multifactorial though not entirely clear, suspect underlying degenerative spine changes contributing to an extent, suspect central sensitization component, with overlying myofascial component.     Employment:    not currently working  Hobbies/Interests:  motorcycle, snowboarding    Patient goals for therapy:  tolerate daily routine better, sit longer, decrease pain    Pain assessment:  current 6/10, range 5-8/10     Objective   EVALUATION  POSTURE: Sitting Posture: Rounded shoulders, Forward head, Lordosis decreased  GAIT:   Gait Deviations: Stride length decreased  Tendency into post pelvic tilt position, shldr IR po sition, min arm swing, trunk rotation  BALANCE/PROPRIOCEPTION: Single Leg Stance Eyes Open (seconds): 2\" R and L, restricted due to c/o knee pain  ROM:  lumbar WFL except ext 25%, neck WFL some ERP,  STRENGTH:  able to heel walk, toe walk.  Ability for 25% squat, limited by knee pain    Assessment & Plan   CLINICAL IMPRESSIONS  Medical Diagnosis: B knee pain, Chronic LBP, " neck pain, HAs, Abdominal pain LLQ, myofascial pain    Treatment Diagnosis: B knee pain, Chronic LBP, neck pain, HAs, Abdominal pain LLQ, myofascial pain, Chronic pain syndrome   Impression/Assessment: Patient is a 38 year old male with multiple jt pain, chronic pain syndrome complaints.  The following significant findings have been identified: Pain, Decreased ROM/flexibility, Decreased strength, Impaired gait, Impaired muscle performance, Decreased activity tolerance, and Impaired posture. These impairments interfere with their ability to perform self care tasks, recreational activities, and household chores as compared to previous level of function.     Clinical Decision Making (Complexity):  Clinical Presentation: Stable/Uncomplicated  Clinical Presentation Rationale: based on medical and personal factors listed in PT evaluation  Clinical Decision Making (Complexity): Low complexity    PLAN OF CARE  Treatment Interventions:  Interventions: Neuromuscular Re-education, Therapeutic Activity, Therapeutic Exercise, Self-Care/Home Management    Long Term Goals     PT Goal 1  Goal Identifier: sitting  Goal Description: able to sit 25 minutes, pain 6/10  Rationale: to maximize safety and independence with performance of ADLs and functional tasks;to maximize safety and independence within the home;to maximize safety and independence within the community;to maximize safety and independence with self cares  Target Date: 06/26/24      Frequency of Treatment: 1x wk x 4 wks, 2x month x 3 months  Duration of Treatment: 4 months    Education Assessment:        Risks and benefits of evaluation/treatment have been explained.   Patient/Family/caregiver agrees with Plan of Care.     Evaluation Time:     PT Eval, Low Complexity Minutes (07306): 30       Signing Clinician: Chandler Tadeo PT      [facility] [place] [facility]                                                                                   OUTPATIENT  PHYSICAL THERAPY      PLAN OF TREATMENT FOR OUTPATIENT REHABILITATION   Patient's Last Name, First Name, Shahram Barahona YOB: 1985   Provider's Name   [facility]   Medical Record No.  [id number]     Onset Date: 11/15/24  Start of Care Date: 02/26/24     Medical Diagnosis:  B knee pain, Chronic LBP, neck pain, HAs, Abdominal pain LLQ, myofascial pain      PT Treatment Diagnosis:  B knee pain, Chronic LBP, neck pain, HAs, Abdominal pain LLQ, myofascial pain, Chronic pain syndrome Plan of Treatment  Frequency/Duration: 1x wk x 4 wks, 2x month x 3 months/ 4 months    Certification date from 02/26/24 to 05/25/24         See note for plan of treatment details and functional goals     Chandler Tadeo, PT                         I CERTIFY THE NEED FOR THESE SERVICES FURNISHED UNDER        THIS PLAN OF TREATMENT AND WHILE UNDER MY CARE     (Physician attestation of this document indicates review and certification of the therapy plan).              Referring Provider:  Loyda Byrd    Initial Assessment  See Epic Evaluation- Start of Care Date: 02/26/24

## 2024-03-08 ENCOUNTER — OCCUPATIONAL HEALTH (OUTPATIENT)
Dept: URGENT CARE | Facility: CLINIC | Age: 39
End: 2024-03-08

## 2024-03-08 VITALS
DIASTOLIC BLOOD PRESSURE: 79 MMHG | HEART RATE: 54 BPM | HEIGHT: 69 IN | TEMPERATURE: 98 F | BODY MASS INDEX: 35.4 KG/M2 | SYSTOLIC BLOOD PRESSURE: 133 MMHG | WEIGHT: 239 LBS

## 2024-03-08 DIAGNOSIS — Z00.00 ENCOUNTER FOR PHYSICAL EXAMINATION: Primary | ICD-10-CM

## 2024-03-08 LAB
CTP QC/QA: YES
POC 10 PANEL DRUG SCREEN: NEGATIVE

## 2024-03-08 PROCEDURE — 99499 UNLISTED E&M SERVICE: CPT | Mod: S$GLB,,, | Performed by: FAMILY MEDICINE

## 2024-03-08 PROCEDURE — 80305 DRUG TEST PRSMV DIR OPT OBS: CPT | Mod: S$GLB,,, | Performed by: FAMILY MEDICINE

## 2024-03-26 ENCOUNTER — THERAPY VISIT (OUTPATIENT)
Dept: PHYSICAL THERAPY | Facility: CLINIC | Age: 39
End: 2024-03-26
Payer: COMMERCIAL

## 2024-03-26 DIAGNOSIS — G89.29 CHRONIC PAIN: Primary | Chronic | ICD-10-CM

## 2024-03-26 PROCEDURE — 97112 NEUROMUSCULAR REEDUCATION: CPT | Mod: GP | Performed by: PHYSICAL THERAPIST

## 2024-03-26 PROCEDURE — 97110 THERAPEUTIC EXERCISES: CPT | Mod: GP | Performed by: PHYSICAL THERAPIST

## 2024-03-28 ENCOUNTER — ANCILLARY PROCEDURE (OUTPATIENT)
Dept: GENERAL RADIOLOGY | Facility: CLINIC | Age: 39
End: 2024-03-28
Attending: PHYSICIAN ASSISTANT
Payer: COMMERCIAL

## 2024-03-28 ENCOUNTER — OFFICE VISIT (OUTPATIENT)
Dept: FAMILY MEDICINE | Facility: CLINIC | Age: 39
End: 2024-03-28
Payer: COMMERCIAL

## 2024-03-28 VITALS
WEIGHT: 289 LBS | OXYGEN SATURATION: 96 % | DIASTOLIC BLOOD PRESSURE: 124 MMHG | HEIGHT: 70 IN | SYSTOLIC BLOOD PRESSURE: 176 MMHG | HEART RATE: 69 BPM | BODY MASS INDEX: 41.37 KG/M2 | TEMPERATURE: 97.6 F | RESPIRATION RATE: 20 BRPM

## 2024-03-28 DIAGNOSIS — S29.9XXA CHEST WALL INJURY, INITIAL ENCOUNTER: ICD-10-CM

## 2024-03-28 DIAGNOSIS — G89.29 CHRONIC MIDLINE LOW BACK PAIN WITHOUT SCIATICA: ICD-10-CM

## 2024-03-28 DIAGNOSIS — F41.1 GAD (GENERALIZED ANXIETY DISORDER): ICD-10-CM

## 2024-03-28 DIAGNOSIS — M79.18 MYOFASCIAL PAIN: ICD-10-CM

## 2024-03-28 DIAGNOSIS — R10.32 LLQ ABDOMINAL PAIN: ICD-10-CM

## 2024-03-28 DIAGNOSIS — M25.562 CHRONIC PAIN OF BOTH KNEES: ICD-10-CM

## 2024-03-28 DIAGNOSIS — F51.04 PSYCHOPHYSIOLOGICAL INSOMNIA: ICD-10-CM

## 2024-03-28 DIAGNOSIS — R51.9 CHRONIC NONINTRACTABLE HEADACHE, UNSPECIFIED HEADACHE TYPE: ICD-10-CM

## 2024-03-28 DIAGNOSIS — M25.561 CHRONIC PAIN OF BOTH KNEES: ICD-10-CM

## 2024-03-28 DIAGNOSIS — M54.2 NECK PAIN: ICD-10-CM

## 2024-03-28 DIAGNOSIS — M54.50 CHRONIC MIDLINE LOW BACK PAIN WITHOUT SCIATICA: ICD-10-CM

## 2024-03-28 DIAGNOSIS — F10.90 ALCOHOL USE DISORDER: ICD-10-CM

## 2024-03-28 DIAGNOSIS — G89.29 CHRONIC PAIN OF BOTH KNEES: ICD-10-CM

## 2024-03-28 DIAGNOSIS — G89.4 CHRONIC PAIN SYNDROME: ICD-10-CM

## 2024-03-28 DIAGNOSIS — G89.29 CHRONIC NONINTRACTABLE HEADACHE, UNSPECIFIED HEADACHE TYPE: ICD-10-CM

## 2024-03-28 DIAGNOSIS — M54.10 RADICULAR LEG PAIN: ICD-10-CM

## 2024-03-28 DIAGNOSIS — I10 BENIGN ESSENTIAL HYPERTENSION: Primary | ICD-10-CM

## 2024-03-28 DIAGNOSIS — F10.10 ALCOHOL CONSUMPTION BINGE DRINKING: ICD-10-CM

## 2024-03-28 PROCEDURE — 99214 OFFICE O/P EST MOD 30 MIN: CPT | Performed by: PHYSICIAN ASSISTANT

## 2024-03-28 PROCEDURE — 71101 X-RAY EXAM UNILAT RIBS/CHEST: CPT | Mod: TC | Performed by: RADIOLOGY

## 2024-03-28 RX ORDER — MELOXICAM 15 MG/1
15 TABLET ORAL DAILY
Qty: 60 TABLET | Refills: 1 | Status: SHIPPED | OUTPATIENT
Start: 2024-03-28 | End: 2024-07-16

## 2024-03-28 RX ORDER — ESCITALOPRAM OXALATE 5 MG/1
10 TABLET ORAL DAILY
Status: SHIPPED
Start: 2024-03-28 | End: 2024-07-24

## 2024-03-28 RX ORDER — ACAMPROSATE CALCIUM 333 MG/1
666 TABLET, DELAYED RELEASE ORAL 3 TIMES DAILY
Qty: 180 TABLET | Refills: 2 | Status: SHIPPED | OUTPATIENT
Start: 2024-03-28 | End: 2024-07-16

## 2024-03-28 RX ORDER — TRAZODONE HYDROCHLORIDE 50 MG/1
50-100 TABLET, FILM COATED ORAL AT BEDTIME
Qty: 180 TABLET | Refills: 1 | Status: SHIPPED | OUTPATIENT
Start: 2024-03-28

## 2024-03-28 RX ORDER — TOPIRAMATE 25 MG/1
TABLET, FILM COATED ORAL
Qty: 180 TABLET | Refills: 1 | Status: SHIPPED | OUTPATIENT
Start: 2024-03-28

## 2024-03-28 RX ORDER — PREGABALIN 75 MG/1
75 CAPSULE ORAL 2 TIMES DAILY
Qty: 60 CAPSULE | Refills: 1 | Status: SHIPPED | OUTPATIENT
Start: 2024-03-28 | End: 2024-07-16

## 2024-03-28 RX ORDER — LISINOPRIL 20 MG/1
20 TABLET ORAL DAILY
Qty: 60 TABLET | Refills: 0 | Status: SHIPPED | OUTPATIENT
Start: 2024-03-28 | End: 2024-05-21

## 2024-03-28 ASSESSMENT — ANXIETY QUESTIONNAIRES
6. BECOMING EASILY ANNOYED OR IRRITABLE: NEARLY EVERY DAY
GAD7 TOTAL SCORE: 21
7. FEELING AFRAID AS IF SOMETHING AWFUL MIGHT HAPPEN: NEARLY EVERY DAY
GAD7 TOTAL SCORE: 21
GAD7 TOTAL SCORE: 21
IF YOU CHECKED OFF ANY PROBLEMS ON THIS QUESTIONNAIRE, HOW DIFFICULT HAVE THESE PROBLEMS MADE IT FOR YOU TO DO YOUR WORK, TAKE CARE OF THINGS AT HOME, OR GET ALONG WITH OTHER PEOPLE: EXTREMELY DIFFICULT
5. BEING SO RESTLESS THAT IT IS HARD TO SIT STILL: NEARLY EVERY DAY
3. WORRYING TOO MUCH ABOUT DIFFERENT THINGS: NEARLY EVERY DAY
2. NOT BEING ABLE TO STOP OR CONTROL WORRYING: NEARLY EVERY DAY
4. TROUBLE RELAXING: NEARLY EVERY DAY
1. FEELING NERVOUS, ANXIOUS, OR ON EDGE: NEARLY EVERY DAY
7. FEELING AFRAID AS IF SOMETHING AWFUL MIGHT HAPPEN: NEARLY EVERY DAY
8. IF YOU CHECKED OFF ANY PROBLEMS, HOW DIFFICULT HAVE THESE MADE IT FOR YOU TO DO YOUR WORK, TAKE CARE OF THINGS AT HOME, OR GET ALONG WITH OTHER PEOPLE?: EXTREMELY DIFFICULT

## 2024-03-28 ASSESSMENT — PATIENT HEALTH QUESTIONNAIRE - PHQ9
10. IF YOU CHECKED OFF ANY PROBLEMS, HOW DIFFICULT HAVE THESE PROBLEMS MADE IT FOR YOU TO DO YOUR WORK, TAKE CARE OF THINGS AT HOME, OR GET ALONG WITH OTHER PEOPLE: EXTREMELY DIFFICULT
SUM OF ALL RESPONSES TO PHQ QUESTIONS 1-9: 18
SUM OF ALL RESPONSES TO PHQ QUESTIONS 1-9: 18

## 2024-03-28 ASSESSMENT — PAIN SCALES - GENERAL: PAINLEVEL: MODERATE PAIN (5)

## 2024-03-28 NOTE — PROGRESS NOTES
"    Patrick Omalley is a 38 year old, presenting for the following health issues:  Recheck Medication        3/28/2024    11:13 AM   Additional Questions   Roomed by Claudia Fernando CMA   Accompanied by None         3/28/2024    11:13 AM   Patient Reported Additional Medications   Patient reports taking the following new medications none     History of Present Illness       Mental Health Follow-up:  Patient presents to follow-up on Depression & Anxiety.Patient's depression since last visit has been:  Better  The patient is having other symptoms associated with depression.  Patient's anxiety since last visit has been:  Better  The patient is having other symptoms associated with anxiety.  Any significant life events: relationship concerns, job concerns, financial concerns, housing concerns, grief or loss, health concerns and other  Patient is feeling anxious or having panic attacks.  Patient has concerns about alcohol or drug use.    Hypertension: He presents for follow up of hypertension.  He does not check blood pressure  regularly outside of the clinic. Outside blood pressures have been over 140/90. He does not follow a low salt diet.     Headaches:   Since the patient's last clinic visit, headaches are: no change  The patient is getting headaches:  Once or twice a month  He is not able to do normal daily activities when he has a migraine.  The patient is taking the following rescue/relief medications:  No rescue/relief medications   Patient states \"I get no relief\" from the rescue/relief medications.   The patient is taking the following medications to prevent migraines:  Topomax  In the past 4 weeks, the patient has gone to an Urgent Care or Emergency Room 0 times times due to headaches.    Reason for visit:  Med check I believe    He eats 2-3 servings of fruits and vegetables daily.He consumes 1 sweetened beverage(s) daily.He exercises with enough effort to increase his heart rate 10 to 19 minutes per day. " " He exercises with enough effort to increase his heart rate 5 days per week. He is missing 2 dose(s) of medications per week.       Mood has improved some. Clean and sober still.  Fall last week injuring his left rib cage.  No ha's /dizziness. No palpitations   Planning to start exercising again.         Review of Systems  Constitutional, neuro, ENT, endocrine, pulmonary, cardiac, gastrointestinal, genitourinary, musculoskeletal, integument and psychiatric systems are negative, except as otherwise noted.      Objective    BP (!) 176/124   Pulse 69   Temp 97.6  F (36.4  C) (Temporal)   Resp 20   Ht 1.778 m (5' 10\")   Wt 131.1 kg (289 lb)   SpO2 96%   BMI 41.47 kg/m    Body mass index is 41.47 kg/m .  Physical Exam   Eye exam - right eye normal lid, conjunctiva, cornea, pupil and fundus, left eye normal lid, conjunctiva, cornea, pupil and fundus.  Thyroid not palpable, not enlarged, no nodules detected.  CHEST:chest clear to IPPA, no tachypnea, retractions or cyanosis, and S1, S2 normal, no murmur, no gallop, rate regular.  Tenderness with palpation of ribs 3-5 on the left.   Pulses normal  Shahram was seen today for recheck medication.    Diagnoses and all orders for this visit:    Benign essential hypertension  -     lisinopril (ZESTRIL) 20 MG tablet; Take 1 tablet (20 mg) by mouth daily    JALEN (generalized anxiety disorder)  -     escitalopram (LEXAPRO) 5 MG tablet; Take 2 tablets (10 mg) by mouth daily    Chest wall injury, initial encounter  -     XR Ribs & Chest Left G/E 3 Views; Future    Alcohol consumption binge drinking  -     acamprosate (CAMPRAL) 333 MG EC tablet; Take 2 tablets (666 mg) by mouth 3 times daily    Chronic pain of both knees  -     meloxicam (MOBIC) 15 MG tablet; Take 1 tablet (15 mg) by mouth daily  -     pregabalin (LYRICA) 75 MG capsule; Take 1 capsule (75 mg) by mouth 2 times daily    Chronic midline low back pain without sciatica  -     meloxicam (MOBIC) 15 MG tablet; Take 1 " tablet (15 mg) by mouth daily  -     pregabalin (LYRICA) 75 MG capsule; Take 1 capsule (75 mg) by mouth 2 times daily    Neck pain  -     pregabalin (LYRICA) 75 MG capsule; Take 1 capsule (75 mg) by mouth 2 times daily    Chronic nonintractable headache, unspecified headache type  -     pregabalin (LYRICA) 75 MG capsule; Take 1 capsule (75 mg) by mouth 2 times daily    Radicular leg pain  -     pregabalin (LYRICA) 75 MG capsule; Take 1 capsule (75 mg) by mouth 2 times daily    LLQ abdominal pain  -     pregabalin (LYRICA) 75 MG capsule; Take 1 capsule (75 mg) by mouth 2 times daily    Myofascial pain  -     pregabalin (LYRICA) 75 MG capsule; Take 1 capsule (75 mg) by mouth 2 times daily    Chronic pain syndrome  -     pregabalin (LYRICA) 75 MG capsule; Take 1 capsule (75 mg) by mouth 2 times daily    Alcohol use disorder  -     topiramate (TOPAMAX) 25 MG tablet; Take 1 tab at bedtime x 2 weeks, then increase to 1 tab bid thereafter    Psychophysiological insomnia  -     traZODone (DESYREL) 50 MG tablet; Take 1-2 tablets ( mg) by mouth at bedtime    Inc lexapro to 10 mg daily.   Start lisinopril.  Recheck blood pressure in 1 mos   Advised supportive and symptomatic treatment.  Follow up with Provider - if condition persists or worsens.   work on lifestyle modification    Signed Electronically by: Severo Armendariz PA-C

## 2024-04-02 ENCOUNTER — THERAPY VISIT (OUTPATIENT)
Dept: PHYSICAL THERAPY | Facility: CLINIC | Age: 39
End: 2024-04-02
Payer: COMMERCIAL

## 2024-04-02 DIAGNOSIS — G89.4 CHRONIC PAIN SYNDROME: Primary | Chronic | ICD-10-CM

## 2024-04-02 PROCEDURE — 97530 THERAPEUTIC ACTIVITIES: CPT | Mod: GP | Performed by: PHYSICAL THERAPIST

## 2024-04-02 PROCEDURE — 97110 THERAPEUTIC EXERCISES: CPT | Mod: GP | Performed by: PHYSICAL THERAPIST

## 2024-04-02 PROCEDURE — 97112 NEUROMUSCULAR REEDUCATION: CPT | Mod: GP | Performed by: PHYSICAL THERAPIST

## 2024-04-19 ENCOUNTER — THERAPY VISIT (OUTPATIENT)
Dept: PHYSICAL THERAPY | Facility: CLINIC | Age: 39
End: 2024-04-19
Payer: COMMERCIAL

## 2024-04-19 DIAGNOSIS — G89.4 CHRONIC PAIN SYNDROME: Primary | Chronic | ICD-10-CM

## 2024-04-19 PROCEDURE — 97112 NEUROMUSCULAR REEDUCATION: CPT | Mod: GP | Performed by: PHYSICAL THERAPIST

## 2024-04-19 PROCEDURE — 97110 THERAPEUTIC EXERCISES: CPT | Mod: GP | Performed by: PHYSICAL THERAPIST

## 2024-05-29 ENCOUNTER — PATIENT OUTREACH (OUTPATIENT)
Dept: CARE COORDINATION | Facility: CLINIC | Age: 39
End: 2024-05-29
Payer: COMMERCIAL

## 2024-06-12 ENCOUNTER — PATIENT OUTREACH (OUTPATIENT)
Dept: CARE COORDINATION | Facility: CLINIC | Age: 39
End: 2024-06-12
Payer: COMMERCIAL

## 2024-07-22 ENCOUNTER — E-VISIT (OUTPATIENT)
Dept: URGENT CARE | Facility: CLINIC | Age: 39
End: 2024-07-22
Payer: COMMERCIAL

## 2024-07-22 DIAGNOSIS — T30.0 BURN: Primary | ICD-10-CM

## 2024-07-22 PROCEDURE — 99207 PR NON-BILLABLE SERV PER CHARTING: CPT | Performed by: NURSE PRACTITIONER

## 2024-07-22 NOTE — PATIENT INSTRUCTIONS
Dear Shahram Young,    We are sorry you are not feeling well. Based on the responses you provided, it is recommended that you be seen in-person in urgent care so we can better evaluate your symptoms. Please click here to find the nearest urgent care location to you.   You will not be charged for this Visit. Thank you for trusting us with your care.    Catia Hall, CNP

## 2024-07-24 ENCOUNTER — OFFICE VISIT (OUTPATIENT)
Dept: FAMILY MEDICINE | Facility: CLINIC | Age: 39
End: 2024-07-24
Payer: COMMERCIAL

## 2024-07-24 VITALS
RESPIRATION RATE: 18 BRPM | HEIGHT: 69 IN | TEMPERATURE: 97.6 F | OXYGEN SATURATION: 100 % | BODY MASS INDEX: 41.2 KG/M2 | SYSTOLIC BLOOD PRESSURE: 142 MMHG | HEART RATE: 72 BPM | DIASTOLIC BLOOD PRESSURE: 110 MMHG | WEIGHT: 278.2 LBS

## 2024-07-24 DIAGNOSIS — E66.01 MORBID OBESITY (H): ICD-10-CM

## 2024-07-24 DIAGNOSIS — I10 BENIGN ESSENTIAL HYPERTENSION: ICD-10-CM

## 2024-07-24 DIAGNOSIS — F41.1 GAD (GENERALIZED ANXIETY DISORDER): ICD-10-CM

## 2024-07-24 DIAGNOSIS — F32.2 CURRENT SEVERE EPISODE OF MAJOR DEPRESSIVE DISORDER WITHOUT PSYCHOTIC FEATURES, UNSPECIFIED WHETHER RECURRENT (H): ICD-10-CM

## 2024-07-24 DIAGNOSIS — F11.20 PATIENT ON METHADONE MAINTENANCE THERAPY (H): ICD-10-CM

## 2024-07-24 DIAGNOSIS — F90.2 ATTENTION DEFICIT HYPERACTIVITY DISORDER (ADHD), COMBINED TYPE: ICD-10-CM

## 2024-07-24 DIAGNOSIS — Z00.01 ENCOUNTER FOR ROUTINE ADULT HEALTH EXAMINATION WITH ABNORMAL FINDINGS: Primary | ICD-10-CM

## 2024-07-24 PROBLEM — F10.21 ALCOHOL DEPENDENCE IN REMISSION (H): Status: ACTIVE | Noted: 2024-07-24

## 2024-07-24 PROCEDURE — 99396 PREV VISIT EST AGE 40-64: CPT | Performed by: PHYSICIAN ASSISTANT

## 2024-07-24 PROCEDURE — 99214 OFFICE O/P EST MOD 30 MIN: CPT | Mod: 25 | Performed by: PHYSICIAN ASSISTANT

## 2024-07-24 RX ORDER — ATOMOXETINE 40 MG/1
40 CAPSULE ORAL DAILY
Qty: 45 CAPSULE | Refills: 0 | Status: SHIPPED | OUTPATIENT
Start: 2024-07-24 | End: 2024-09-04

## 2024-07-24 RX ORDER — CHLORTHALIDONE 25 MG/1
25 TABLET ORAL DAILY
Qty: 90 TABLET | Refills: 0 | Status: SHIPPED | OUTPATIENT
Start: 2024-07-24 | End: 2024-10-01

## 2024-07-24 ASSESSMENT — ANXIETY QUESTIONNAIRES
1. FEELING NERVOUS, ANXIOUS, OR ON EDGE: NEARLY EVERY DAY
5. BEING SO RESTLESS THAT IT IS HARD TO SIT STILL: NEARLY EVERY DAY
IF YOU CHECKED OFF ANY PROBLEMS ON THIS QUESTIONNAIRE, HOW DIFFICULT HAVE THESE PROBLEMS MADE IT FOR YOU TO DO YOUR WORK, TAKE CARE OF THINGS AT HOME, OR GET ALONG WITH OTHER PEOPLE: EXTREMELY DIFFICULT
GAD7 TOTAL SCORE: 21
6. BECOMING EASILY ANNOYED OR IRRITABLE: NEARLY EVERY DAY
3. WORRYING TOO MUCH ABOUT DIFFERENT THINGS: NEARLY EVERY DAY
2. NOT BEING ABLE TO STOP OR CONTROL WORRYING: NEARLY EVERY DAY
7. FEELING AFRAID AS IF SOMETHING AWFUL MIGHT HAPPEN: NEARLY EVERY DAY
GAD7 TOTAL SCORE: 21

## 2024-07-24 ASSESSMENT — PATIENT HEALTH QUESTIONNAIRE - PHQ9
10. IF YOU CHECKED OFF ANY PROBLEMS, HOW DIFFICULT HAVE THESE PROBLEMS MADE IT FOR YOU TO DO YOUR WORK, TAKE CARE OF THINGS AT HOME, OR GET ALONG WITH OTHER PEOPLE: EXTREMELY DIFFICULT
SUM OF ALL RESPONSES TO PHQ QUESTIONS 1-9: 27
5. POOR APPETITE OR OVEREATING: NEARLY EVERY DAY
SUM OF ALL RESPONSES TO PHQ QUESTIONS 1-9: 27

## 2024-07-24 NOTE — PROGRESS NOTES
Preventive Care Visit  [facility] AZEEM Armendariz PA-C, Family Medicine  Jul 24, 2024        Subjective   Shahram is a 39 year old, presenting for the following:  Physical, Hand Burn, Medication Refill, vaccines, Anxiety Depression, and Hypertension  High blood pressure 142/110, 148/118.  No chest pain/sob/palpitations/dizziness/ha's      Medication refills.      12/4/2023     5:19 PM 3/28/2024    10:54 AM 7/24/2024     1:42 PM   JALEN-7 SCORE   Total Score 21 (severe anxiety) 21 (severe anxiety)    Total Score 21 21 21            3/28/2024    10:50 AM 7/24/2024     1:25 PM 7/24/2024     1:42 PM   PHQ   PHQ-9 Total Score 18 27 27   Q9: Thoughts of better off dead/self-harm past 2 weeks Several days Nearly every day Nearly every day   F/U: Thoughts of suicide or self-harm No Yes    F/U: Self harm-plan  No    F/U: Self-harm action  No    F/U: Safety concerns No Yes           7/24/2024     1:35 PM   Additional Questions   Roomed by Arline JUNE         7/24/2024     1:35 PM   Patient Reported Additional Medications   Patient reports taking the following new medications methadone 38mg daily        Health Care Directive  Patient does not have a Health Care Directive or Living Will: Discussed advance care planning with patient; however, patient declined at this time.    Medication Refill    History of adhd.   Recheck of htn.  Using meth on very rare occasional . Drinking intermittently to rare. On methadone maintenance currently weaning down. Recheck of obesity. Discussed a lower calorie diet and consistent mix of aerobic exercise and strength training. This will help maintain/treat his blood pressure.        7/24/2024   General Health   How would you rate your overall physical health? (!) FAIR            7/24/2024   Nutrition   Three or more servings of calcium each day? Yes   Diet: Regular (no restrictions)   How many servings of fruit and vegetables per day? 4 or more   How many sweetened beverages  each day? 0-1            6/27/2023   Exercise   Frequency of exercise: None            11/30/2023   Social Factors   Worry food won't last until get money to buy more Yes   Food not last or not have enough money for food? Yes   Do you have housing? (Housing is defined as stable permanent housing and does not include staying ouside in a car, in a tent, in an abandoned building, in an overnight shelter, or couch-surfing.) Yes   Are you worried about losing your housing? Yes   Lack of transportation? No   Unable to get utilities (heat,electricity)? No   Want help with housing or utility concern? Yes            6/27/2023   Dental   Dentist two times every year? No             Today's PHQ-9 Score:       7/24/2024     1:42 PM   PHQ-9 SCORE   PHQ-9 Total Score 27         2/12/2024   Substance Use   If I could quit smoking, I would Somewhat agree   I want to quit somking, worry about health affects Completely agree   Willing to make a plan to quit smoking Completely agree   Willing to cut down before quitting Completely agree        Social History     Tobacco Use    Smoking status: Every Day     Current packs/day: 1.00     Average packs/day: 1 pack/day for 10.0 years (10.0 ttl pk-yrs)     Types: Cigarettes     Passive exposure: Never    Smokeless tobacco: Never    Tobacco comments:     Planning use of Nicotrol for stopping cigarettes. Smokes 6-8 cigarettes a day.   Vaping Use    Vaping status: Never Used   Substance Use Topics    Alcohol use: Not Currently     Comment: Pt has been drinking 1L whiskey per day    Drug use: Yes     Frequency: 7.0 times per week     Types: Marijuana     Comment: usually has 1-2 hits at night to sleep, past opiod use problems and methamphetamine, cocaine use       ASCVD Risk   The 10-year ASCVD risk score (Simon IRWIN, et al., 2019) is: 2.4%    Values used to calculate the score:      Age: 39 years      Sex: Male      Is Non- : No      Diabetic: No      Tobacco  smoker: Yes      Systolic Blood Pressure: 142 mmHg      Is BP treated: Yes      HDL Cholesterol: 86 mg/dL      Total Cholesterol: 200 mg/dL         No data to display                 Reviewed and updated as needed this visit by Provider                    Past Medical History:   Diagnosis Date    Alcohol dependence with intoxication with complication (H) 03/31/2022    Alcoholic hepatitis without ascites (H28) 03/13/2023    Anxiety     Arthritis     Depressive disorder     Diverticulitis 01/2013    S/p partial colectomy    Epididymitis 08/01/2011    PTSD (post-traumatic stress disorder)     Substance abuse (H)     Opiates, Alcohol    Suicidal ideation 07/2011    hosp [facility] 7/2011     Past Surgical History:   Procedure Laterality Date    ARTHROSCOPY KNEE RT/LT  07/10/2012    Left Knee. Joint debridement, ligament repair. [facility].    COLONOSCOPY  11/04/2011    Procedure:COLONOSCOPY; colonoscopy; Surgeon:ITZ MARTE; Location: GI    COLONOSCOPY  11/01/2011    Negative colonoscopy    COLONOSCOPY  06/26/2012    [facility]    ORTHOPEDIC SURGERY  2008    right foot had screw removed in 2008    Partial left colectomy 1-3-13  01/03/2013    diverticulitis    WRIST SURGERY Left 2015    ORIF     BP Readings from Last 3 Encounters:   07/24/24 (!) 142/110   03/28/24 (!) 176/124   02/12/24 138/82    Wt Readings from Last 3 Encounters:   07/24/24 126.2 kg (278 lb 3.2 oz)   03/28/24 131.1 kg (289 lb)   02/12/24 124.2 kg (273 lb 12.8 oz)                  Patient Active Problem List   Diagnosis    Anxiety disorder    Posttraumatic stress disorder    CARDIOVASCULAR SCREENING; LDL GOAL LESS THAN 160    Mild major depression (H)    Vitamin D deficiency    Alcohol use disorder    Chronic abdominal pain    HTN, goal below 140/90    Opioid dependence in remission (H)    Morbid obesity (H)    Polysubstance abuse (H)    Cannabis dependence (H)    Tobacco use disorder    Chronic pain    Chronic insomnia     Alcohol dependence in remission (H)     Past Surgical History:   Procedure Laterality Date    ARTHROSCOPY KNEE RT/LT  07/10/2012    Left Knee. Joint debridement, ligament repair. [facility].    COLONOSCOPY  11/04/2011    Procedure:COLONOSCOPY; colonoscopy; Surgeon:ITZ MARTE; Location:[facility]    COLONOSCOPY  11/01/2011    Negative colonoscopy    COLONOSCOPY  06/26/2012    [facility]    ORTHOPEDIC SURGERY  2008    right foot had screw removed in 2008    Partial left colectomy 1-3-13  01/03/2013    diverticulitis    WRIST SURGERY Left 2015    ORIF       Social History     Tobacco Use    Smoking status: Every Day     Current packs/day: 1.00     Average packs/day: 1 pack/day for 10.0 years (10.0 ttl pk-yrs)     Types: Cigarettes     Passive exposure: Never    Smokeless tobacco: Never    Tobacco comments:     Planning use of Nicotrol for stopping cigarettes. Smokes 6-8 cigarettes a day.   Substance Use Topics    Alcohol use: Not Currently     Comment: Pt has been drinking 1L whiskey per day     Family History   Problem Relation Age of Onset    Hypertension Father     Breast Cancer Maternal Grandmother     Cancer Paternal Grandmother     C.A.D. Paternal Grandfather     Diabetes No family hx of     Cancer - colorectal No family hx of     Prostate Cancer No family hx of          Current Outpatient Medications   Medication Sig Dispense Refill    acamprosate (CAMPRAL) 333 MG EC tablet TAKE 2 TABLETS BY MOUTH 3 TIMES A  tablet 0    atomoxetine (STRATTERA) 40 MG capsule Take 1 capsule (40 mg) by mouth daily 45 capsule 0    chlorthalidone (HYGROTON) 25 MG tablet Take 1 tablet (25 mg) by mouth daily 90 tablet 0    lisinopril (ZESTRIL) 20 MG tablet Take 1 tablet (20 mg) by mouth daily 30 tablet 0    meloxicam (MOBIC) 15 MG tablet TAKE 1 TABLET BY MOUTH EVERY DAY 60 tablet 1    methadone (DOLPHINE) 5 MG/5ML solution Take 55 mg by mouth daily      pregabalin (LYRICA) 75 MG capsule TAKE 1 CAPSULE  "BY MOUTH 2 TIMES DAILY. 60 capsule 1    propranolol ER (INDERAL LA) 60 MG 24 hr capsule TAKE 1 CAPSULE BY MOUTH EVERY DAY 30 capsule 0    topiramate (TOPAMAX) 25 MG tablet Take 1 tab at bedtime x 2 weeks, then increase to 1 tab bid thereafter 180 tablet 1    traZODone (DESYREL) 50 MG tablet Take 1-2 tablets ( mg) by mouth at bedtime 180 tablet 1    vortioxetine (TRINTELLIX) 10 MG tablet Take 1 tablet (10 mg) by mouth daily 45 tablet 0     Allergies   Allergen Reactions    Sulfa Antibiotics Swelling and Anaphylaxis    Pcn [Penicillins]      Possibly rash noted-- unsure     Recent Labs   Lab Test 02/12/24  1102 08/23/23  1550 06/28/23  1410 04/02/22  0646 04/01/22  0659 03/31/22  1348 01/12/22  1414 03/23/21  1419 03/07/21  0645 03/05/21  1504   A1C  --   --  5.7*  --   --   --   --   --   --   --    LDL  --   --   --   --  104*  --   --   --   --   --    HDL  --   --   --   --  86  --   --   --   --   --    TRIG  --   --   --   --  48  --   --   --   --   --    ALT 85*  --   --   --  117* 145*   < > 63   < > 265*   CR 0.98 0.68  --   --  0.66 0.56*   < > 0.78  --  0.75   GFRESTIMATED >90 >90  --   --  >90 >90   < > >90  --  >90   GFRESTBLACK  --   --   --   --   --   --   --  >90  --  >90   POTASSIUM 3.7 3.8  --    < > 3.2* 3.5   < > 4.5  --  3.5   TSH  --  2.02  --   --  3.15  --   --   --   --  2.09    < > = values in this interval not displayed.          Review of Systems  Constitutional, HEENT, cardiovascular, pulmonary, GI, , musculoskeletal, neuro, skin, endocrine and psych systems are negative, except as otherwise noted.     Objective    Exam  BP (!) 142/110   Pulse 72   Temp 97.6  F (36.4  C) (Temporal)   Resp 18   Ht 1.76 m (5' 9.29\")   Wt 126.2 kg (278 lb 3.2 oz)   SpO2 100%   BMI 40.74 kg/m     Estimated body mass index is 40.74 kg/m  as calculated from the following:    Height as of this encounter: 1.76 m (5' 9.29\").    Weight as of this encounter: 126.2 kg (278 lb 3.2 oz).    Physical " Exam  GENERAL: alert and no distress  EYES: Eyes grossly normal to inspection, PERRL and conjunctivae and sclerae normal  HENT: ear canals and TM's normal, nose and mouth without ulcers or lesions  NECK: no adenopathy, no asymmetry, masses, or scars  RESP: lungs clear to auscultation - no rales, rhonchi or wheezes  CV: regular rate and rhythm, normal S1 S2, no S3 or S4, no murmur, click or rub, no peripheral edema  ABDOMEN: soft, nontender, no hepatosplenomegaly, no masses and bowel sounds normal  MS: no gross musculoskeletal defects noted, no edema  SKIN: no suspicious lesions or rashes  NEURO: Normal strength and tone, mentation intact and speech normal  PSYCH: mentation appears normal, affect normal/bright  Right hand small (1cmx1.5cm) burn on palm. No signs of infx.  Shahram was seen today for physical, hand burn, medication refill, vaccines, anxiety depression and hypertension.    Diagnoses and all orders for this visit:    Encounter for routine adult health examination with abnormal findings    Benign essential hypertension  -     chlorthalidone (HYGROTON) 25 MG tablet; Take 1 tablet (25 mg) by mouth daily    JALEN (generalized anxiety disorder)  -     vortioxetine (TRINTELLIX) 10 MG tablet; Take 1 tablet (10 mg) by mouth daily    Current severe episode of major depressive disorder without psychotic features, unspecified whether recurrent (H)  -     vortioxetine (TRINTELLIX) 10 MG tablet; Take 1 tablet (10 mg) by mouth daily    Patient on methadone maintenance therapy (H)    Morbid obesity (H)    Attention deficit hyperactivity disorder (ADHD), combined type  -     atomoxetine (STRATTERA) 40 MG capsule; Take 1 capsule (40 mg) by mouth daily      Added in chlorthalidone.  Added in trintellix.  Recheck in 4-6 wks.       Signed Electronically by: Severo Armendariz PA-C    Answers submitted by the patient for this visit:  Patient Health Questionnaire (Submitted on 7/24/2024)  If you checked off any problems, how  difficult have these problems made it for you to do your work, take care of things at home, or get along with other people?: Extremely difficult  PHQ9 TOTAL SCORE: 27

## 2024-09-30 DIAGNOSIS — F41.1 GAD (GENERALIZED ANXIETY DISORDER): ICD-10-CM

## 2024-09-30 DIAGNOSIS — F10.10 ALCOHOL CONSUMPTION BINGE DRINKING: ICD-10-CM

## 2024-09-30 DIAGNOSIS — M25.562 CHRONIC PAIN OF BOTH KNEES: ICD-10-CM

## 2024-09-30 DIAGNOSIS — M25.561 CHRONIC PAIN OF BOTH KNEES: ICD-10-CM

## 2024-09-30 DIAGNOSIS — G89.29 CHRONIC PAIN OF BOTH KNEES: ICD-10-CM

## 2024-09-30 DIAGNOSIS — M54.50 CHRONIC MIDLINE LOW BACK PAIN WITHOUT SCIATICA: ICD-10-CM

## 2024-09-30 DIAGNOSIS — I10 BENIGN ESSENTIAL HYPERTENSION: ICD-10-CM

## 2024-09-30 DIAGNOSIS — F90.2 ATTENTION DEFICIT HYPERACTIVITY DISORDER (ADHD), COMBINED TYPE: ICD-10-CM

## 2024-09-30 DIAGNOSIS — G89.29 CHRONIC MIDLINE LOW BACK PAIN WITHOUT SCIATICA: ICD-10-CM

## 2024-09-30 DIAGNOSIS — F32.2 CURRENT SEVERE EPISODE OF MAJOR DEPRESSIVE DISORDER WITHOUT PSYCHOTIC FEATURES, UNSPECIFIED WHETHER RECURRENT (H): ICD-10-CM

## 2024-10-01 RX ORDER — MELOXICAM 15 MG/1
15 TABLET ORAL DAILY
Qty: 60 TABLET | Refills: 1 | Status: SHIPPED | OUTPATIENT
Start: 2024-10-01 | End: 2024-10-31

## 2024-10-01 RX ORDER — CHLORTHALIDONE 25 MG/1
25 TABLET ORAL DAILY
Qty: 30 TABLET | Refills: 0 | Status: SHIPPED | OUTPATIENT
Start: 2024-10-01 | End: 2024-10-24

## 2024-10-01 RX ORDER — ACAMPROSATE CALCIUM 333 MG/1
666 TABLET, DELAYED RELEASE ORAL 3 TIMES DAILY
Qty: 180 TABLET | Refills: 0 | Status: SHIPPED | OUTPATIENT
Start: 2024-10-01 | End: 2024-10-31

## 2024-10-01 RX ORDER — ATOMOXETINE 40 MG/1
40 CAPSULE ORAL DAILY
Qty: 30 CAPSULE | Refills: 0 | Status: SHIPPED | OUTPATIENT
Start: 2024-10-01 | End: 2024-10-24

## 2024-10-01 RX ORDER — VORTIOXETINE 10 MG/1
10 TABLET, FILM COATED ORAL DAILY
Qty: 30 TABLET | Refills: 0 | Status: SHIPPED | OUTPATIENT
Start: 2024-10-01 | End: 2024-10-31

## 2024-10-23 DIAGNOSIS — F90.2 ATTENTION DEFICIT HYPERACTIVITY DISORDER (ADHD), COMBINED TYPE: ICD-10-CM

## 2024-10-23 DIAGNOSIS — I10 BENIGN ESSENTIAL HYPERTENSION: ICD-10-CM

## 2024-10-24 RX ORDER — ATOMOXETINE 40 MG/1
40 CAPSULE ORAL DAILY
Qty: 30 CAPSULE | Refills: 0 | Status: SHIPPED | OUTPATIENT
Start: 2024-10-24 | End: 2024-10-31

## 2024-10-24 RX ORDER — CHLORTHALIDONE 25 MG/1
25 TABLET ORAL DAILY
Qty: 30 TABLET | Refills: 0 | Status: SHIPPED | OUTPATIENT
Start: 2024-10-24 | End: 2024-10-31

## 2024-10-25 NOTE — TELEPHONE ENCOUNTER
Patient is scheduled.      Deysi Robles   Lead    [facility] [place]

## 2024-10-31 ENCOUNTER — VIRTUAL VISIT (OUTPATIENT)
Dept: FAMILY MEDICINE | Facility: CLINIC | Age: 39
End: 2024-10-31
Payer: COMMERCIAL

## 2024-10-31 DIAGNOSIS — I10 BENIGN ESSENTIAL HYPERTENSION: ICD-10-CM

## 2024-10-31 DIAGNOSIS — M25.562 CHRONIC PAIN OF BOTH KNEES: ICD-10-CM

## 2024-10-31 DIAGNOSIS — G89.29 CHRONIC PAIN OF BOTH KNEES: ICD-10-CM

## 2024-10-31 DIAGNOSIS — G89.4 CHRONIC PAIN SYNDROME: ICD-10-CM

## 2024-10-31 DIAGNOSIS — R51.9 CHRONIC NONINTRACTABLE HEADACHE, UNSPECIFIED HEADACHE TYPE: ICD-10-CM

## 2024-10-31 DIAGNOSIS — M25.561 CHRONIC PAIN OF BOTH KNEES: ICD-10-CM

## 2024-10-31 DIAGNOSIS — M54.10 RADICULAR LEG PAIN: ICD-10-CM

## 2024-10-31 DIAGNOSIS — R10.32 LLQ ABDOMINAL PAIN: ICD-10-CM

## 2024-10-31 DIAGNOSIS — M54.50 CHRONIC MIDLINE LOW BACK PAIN WITHOUT SCIATICA: ICD-10-CM

## 2024-10-31 DIAGNOSIS — G89.29 CHRONIC NONINTRACTABLE HEADACHE, UNSPECIFIED HEADACHE TYPE: ICD-10-CM

## 2024-10-31 DIAGNOSIS — G89.29 CHRONIC MIDLINE LOW BACK PAIN WITHOUT SCIATICA: ICD-10-CM

## 2024-10-31 DIAGNOSIS — M54.2 NECK PAIN: ICD-10-CM

## 2024-10-31 DIAGNOSIS — M79.18 MYOFASCIAL PAIN: ICD-10-CM

## 2024-10-31 DIAGNOSIS — F32.2 CURRENT SEVERE EPISODE OF MAJOR DEPRESSIVE DISORDER WITHOUT PSYCHOTIC FEATURES, UNSPECIFIED WHETHER RECURRENT (H): Primary | ICD-10-CM

## 2024-10-31 PROCEDURE — 99214 OFFICE O/P EST MOD 30 MIN: CPT | Mod: 95 | Performed by: PHYSICIAN ASSISTANT

## 2024-10-31 RX ORDER — LISINOPRIL 20 MG/1
20 TABLET ORAL DAILY
Qty: 90 TABLET | Refills: 1 | Status: SHIPPED | OUTPATIENT
Start: 2024-10-31

## 2024-10-31 RX ORDER — PREGABALIN 75 MG/1
75 CAPSULE ORAL 2 TIMES DAILY
Qty: 180 CAPSULE | Refills: 1 | Status: SHIPPED | OUTPATIENT
Start: 2024-10-31

## 2024-10-31 ASSESSMENT — ANXIETY QUESTIONNAIRES
2. NOT BEING ABLE TO STOP OR CONTROL WORRYING: NEARLY EVERY DAY
IF YOU CHECKED OFF ANY PROBLEMS ON THIS QUESTIONNAIRE, HOW DIFFICULT HAVE THESE PROBLEMS MADE IT FOR YOU TO DO YOUR WORK, TAKE CARE OF THINGS AT HOME, OR GET ALONG WITH OTHER PEOPLE: EXTREMELY DIFFICULT
7. FEELING AFRAID AS IF SOMETHING AWFUL MIGHT HAPPEN: NEARLY EVERY DAY
GAD7 TOTAL SCORE: 21
5. BEING SO RESTLESS THAT IT IS HARD TO SIT STILL: NEARLY EVERY DAY
3. WORRYING TOO MUCH ABOUT DIFFERENT THINGS: NEARLY EVERY DAY
1. FEELING NERVOUS, ANXIOUS, OR ON EDGE: NEARLY EVERY DAY
6. BECOMING EASILY ANNOYED OR IRRITABLE: NEARLY EVERY DAY
GAD7 TOTAL SCORE: 21

## 2024-10-31 ASSESSMENT — PATIENT HEALTH QUESTIONNAIRE - PHQ9
10. IF YOU CHECKED OFF ANY PROBLEMS, HOW DIFFICULT HAVE THESE PROBLEMS MADE IT FOR YOU TO DO YOUR WORK, TAKE CARE OF THINGS AT HOME, OR GET ALONG WITH OTHER PEOPLE: EXTREMELY DIFFICULT
5. POOR APPETITE OR OVEREATING: NEARLY EVERY DAY
SUM OF ALL RESPONSES TO PHQ QUESTIONS 1-9: 26
SUM OF ALL RESPONSES TO PHQ QUESTIONS 1-9: 26

## 2024-10-31 NOTE — PROGRESS NOTES
Shahram is a 39 year old who is being evaluated via a billable video visit.    How would you like to obtain your AVS? MyChart  If the video visit is dropped, the invitation should be resent by: Text to cell phone: [phone]  Will anyone else be joining your video visit? No        Subjective   Shahram is a 39 year old, presenting for the following health issues:  RECHECK (medication)        10/31/2024     4:04 PM   Additional Questions   Roomed by Claudia Fernando CMA   Accompanied by None         10/31/2024     4:04 PM   Patient Reported Additional Medications   Patient reports taking the following new medications none     Video Start Time: 4:21 PM    HPI   Depression and Anxiety   How are you doing with your depression since your last visit? Worsened   How are you doing with your anxiety since your last visit?  Worsened   Are you having other symptoms that might be associated with depression or anxiety? No  Have you had a significant life event? Relationship Concerns, Job Concerns, Financial Concerns, Housing Concerns, Grief or Loss, Health Concerns, and OTHER: Legal    Do you have any concerns with your use of alcohol or other drugs? No  Doing well lately with regard to CD. Using cannabis once a week. No etoh lately. No meth lately.  Depression . No anxiety.  Lyrica helped his neuropathy.  Not checking blood pressure.     Social History     Tobacco Use    Smoking status: Every Day     Current packs/day: 1.00     Average packs/day: 1 pack/day for 10.0 years (10.0 ttl pk-yrs)     Types: Cigarettes     Passive exposure: Never    Smokeless tobacco: Never    Tobacco comments:     Planning use of Nicotrol for stopping cigarettes. Smokes 6-8 cigarettes a day.   Vaping Use    Vaping status: Never Used   Substance Use Topics    Alcohol use: Not Currently     Comment: Pt has been drinking 1L whiskey per day    Drug use: Yes     Frequency: 7.0 times per week     Types: Marijuana     Comment: usually has 1-2 hits at night  to sleep, past opiod use problems and methamphetamine, cocaine use         7/24/2024     1:42 PM 10/31/2024     4:08 PM 10/31/2024     4:15 PM   PHQ   PHQ-9 Total Score 27 26 26    Q9: Thoughts of better off dead/self-harm past 2 weeks Nearly every day More than half the days Nearly every day        Patient-reported         3/28/2024    10:54 AM 7/24/2024     1:42 PM 10/31/2024     4:13 PM   JALEN-7 SCORE   Total Score 21 (severe anxiety)     Total Score 21 21 21         10/31/2024     4:15 PM   Last PHQ-9   1.  Little interest or pleasure in doing things 3    2.  Feeling down, depressed, or hopeless 3    3.  Trouble falling or staying asleep, or sleeping too much 3    4.  Feeling tired or having little energy 2    5.  Poor appetite or overeating 3    6.  Feeling bad about yourself 3    7.  Trouble concentrating 3    8.  Moving slowly or restless 3    Q9: Thoughts of better off dead/self-harm past 2 weeks 3    PHQ-9 Total Score 26        Patient-reported         10/31/2024     4:13 PM   JALEN-7    1. Feeling nervous, anxious, or on edge 3   2. Not being able to stop or control worrying 3   3. Worrying too much about different things 3   4. Trouble relaxing 3   5. Being so restless that it is hard to sit still 3   6. Becoming easily annoyed or irritable 3   7. Feeling afraid, as if something awful might happen 3   JALEN-7 Total Score 21   If you checked any problems, how difficult have they made it for you to do your work, take care of things at home, or get along with other people? Extremely difficult             Review of Systems  Constitutional, HEENT, cardiovascular, pulmonary, GI, , musculoskeletal, neuro, skin, endocrine and psych systems are negative, except as otherwise noted.      Objective           Vitals:  No vitals were obtained today due to virtual visit.    Physical Exam   GENERAL: alert and no distress  EYES: Eyes grossly normal to inspection.  No discharge or erythema, or obvious scleral/conjunctival  abnormalities.  RESP: No audible wheeze, cough, or visible cyanosis.    SKIN: Visible skin clear. No significant rash, abnormal pigmentation or lesions.  NEURO: Cranial nerves grossly intact.  Mentation and speech appropriate for age.  PSYCH: Appropriate affect, tone, and pace of words    Shahram was seen today for recheck.    Diagnoses and all orders for this visit:    Current severe episode of major depressive disorder without psychotic features, unspecified whether recurrent (H)  -     dextromethorphan-buPROPion ER (AUVELITY)  MG ER tablet; Take 1 tablet by mouth daily.  -     Adult Mental Health  Referral; Future    Chronic pain of both knees  -     pregabalin (LYRICA) 75 MG capsule; Take 1 capsule (75 mg) by mouth 2 times daily.    Chronic midline low back pain without sciatica  -     pregabalin (LYRICA) 75 MG capsule; Take 1 capsule (75 mg) by mouth 2 times daily.    Neck pain  -     pregabalin (LYRICA) 75 MG capsule; Take 1 capsule (75 mg) by mouth 2 times daily.    Chronic nonintractable headache, unspecified headache type  -     pregabalin (LYRICA) 75 MG capsule; Take 1 capsule (75 mg) by mouth 2 times daily.    Radicular leg pain  -     pregabalin (LYRICA) 75 MG capsule; Take 1 capsule (75 mg) by mouth 2 times daily.    LLQ abdominal pain  -     pregabalin (LYRICA) 75 MG capsule; Take 1 capsule (75 mg) by mouth 2 times daily.    Myofascial pain  -     pregabalin (LYRICA) 75 MG capsule; Take 1 capsule (75 mg) by mouth 2 times daily.    Chronic pain syndrome  -     pregabalin (LYRICA) 75 MG capsule; Take 1 capsule (75 mg) by mouth 2 times daily.    Benign essential hypertension  -     lisinopril (ZESTRIL) 20 MG tablet; Take 1 tablet (20 mg) by mouth daily.            Video-Visit Details    Type of service:  Video Visit   Video End Time:4:56 PM  Originating Location (pt. Location): Home    Distant Location (provider location):  On-site  Platform used for Video Visit: [employer]kley [employer]oo  Electronically by: Severo Armendariz PA-C    Answers submitted by the patient for this visit:  Patient Health Questionnaire (Submitted on 10/31/2024)  If you checked off any problems, how difficult have these problems made it for you to do your work, take care of things at home, or get along with other people?: Extremely difficult  PHQ9 TOTAL SCORE: 26

## 2024-11-12 NOTE — PLAN OF CARE
Goal Outcome Evaluation:    Plan of Care Reviewed With: patient          Problem: Alcohol Withdrawal  Goal: Alcohol Withdrawal Symptom Control  Outcome: Ongoing, Progressing      Patient is tolerating intakes, Endorses anxiety and depression 10/10 PRN Atarax 25 mg twice. MSSA was 2, no Valium given.  BP was 98/97 and HR was 58, encouraged fluids.   At noon BP was 157/107 HR 53 MSSA was 5, patient requested f Valium but had not meet parameters per protocol.   Patient went to sleep , states kept coming out to desk hearing people stepping in his room.Writer asked pt if notified staff , he stated that none of them is listening.  Writer reassessed patient and scored him a 8 and gave him 10 mg of Valium at 1445.  Patient has family visit at 1400.  Continue with POC.   Pt is functionally independent and not in need of skilled PT, will no longer follow

## 2025-06-24 ENCOUNTER — PATIENT OUTREACH (OUTPATIENT)
Dept: CARE COORDINATION | Facility: CLINIC | Age: 40
End: 2025-06-24
Payer: COMMERCIAL

## 2025-07-08 ENCOUNTER — PATIENT OUTREACH (OUTPATIENT)
Dept: CARE COORDINATION | Facility: CLINIC | Age: 40
End: 2025-07-08
Payer: COMMERCIAL

## 2025-08-06 ENCOUNTER — OFFICE VISIT (OUTPATIENT)
Dept: FAMILY MEDICINE | Facility: CLINIC | Age: 40
End: 2025-08-06
Payer: COMMERCIAL

## 2025-08-06 VITALS
WEIGHT: 250.75 LBS | HEIGHT: 69 IN | HEART RATE: 72 BPM | SYSTOLIC BLOOD PRESSURE: 120 MMHG | OXYGEN SATURATION: 97 % | DIASTOLIC BLOOD PRESSURE: 80 MMHG | BODY MASS INDEX: 37.14 KG/M2 | RESPIRATION RATE: 18 BRPM

## 2025-08-06 DIAGNOSIS — F90.0 ATTENTION DEFICIT HYPERACTIVITY DISORDER (ADHD), PREDOMINANTLY INATTENTIVE TYPE: ICD-10-CM

## 2025-08-06 DIAGNOSIS — Z00.00 WELLNESS EXAMINATION: Primary | ICD-10-CM

## 2025-08-06 DIAGNOSIS — F41.1 GAD (GENERALIZED ANXIETY DISORDER): ICD-10-CM

## 2025-08-06 DIAGNOSIS — E66.813 CLASS 3 SEVERE OBESITY WITHOUT SERIOUS COMORBIDITY IN ADULT: ICD-10-CM

## 2025-08-06 PROCEDURE — 99396 PREV VISIT EST AGE 40-64: CPT | Mod: S$GLB,,, | Performed by: FAMILY MEDICINE

## 2025-08-06 PROCEDURE — 3079F DIAST BP 80-89 MM HG: CPT | Mod: CPTII,S$GLB,, | Performed by: FAMILY MEDICINE

## 2025-08-06 PROCEDURE — 3074F SYST BP LT 130 MM HG: CPT | Mod: CPTII,S$GLB,, | Performed by: FAMILY MEDICINE

## 2025-08-06 PROCEDURE — 3008F BODY MASS INDEX DOCD: CPT | Mod: CPTII,S$GLB,, | Performed by: FAMILY MEDICINE

## 2025-08-06 PROCEDURE — 1159F MED LIST DOCD IN RCRD: CPT | Mod: CPTII,S$GLB,, | Performed by: FAMILY MEDICINE

## 2025-08-06 PROCEDURE — 1160F RVW MEDS BY RX/DR IN RCRD: CPT | Mod: CPTII,S$GLB,, | Performed by: FAMILY MEDICINE

## 2025-08-06 PROCEDURE — 99999 PR PBB SHADOW E&M-EST. PATIENT-LVL III: CPT | Mod: PBBFAC,,, | Performed by: FAMILY MEDICINE

## 2025-08-06 RX ORDER — ALPRAZOLAM 0.5 MG/1
0.5 TABLET ORAL 3 TIMES DAILY
Qty: 90 TABLET | Refills: 0 | Status: SHIPPED | OUTPATIENT
Start: 2025-08-06 | End: 2025-09-05

## 2025-08-06 RX ORDER — DEXTROAMPHETAMINE SACCHARATE, AMPHETAMINE ASPARTATE, DEXTROAMPHETAMINE SULFATE AND AMPHETAMINE SULFATE 5; 5; 5; 5 MG/1; MG/1; MG/1; MG/1
1 TABLET ORAL DAILY
Qty: 60 TABLET | Refills: 0 | Status: SHIPPED | OUTPATIENT
Start: 2025-08-06

## 2025-08-06 NOTE — PROGRESS NOTES
Patient ID: Aniceto Corona is a 40 y.o. male.    Chief Complaint: Annual Exam and Establish Care    History of Present Illness    CHIEF COMPLAINT:  Patient presents today to establish care.    ADHD:  He reports a diagnosis of ADHD currently without active treatment and expresses interest in initiating medication management. He acknowledges elevated anxiety levels, which he attributes to untreated ADHD. He is motivated to explore pharmacological intervention to address both ADHD symptoms and associated anxiety.    MEDICAL HISTORY:  He has no chronic ongoing medical illnesses.      ROS:  General: -fever, -chills, -fatigue, -weight gain, -weight loss  Eyes: -vision changes, -redness, -discharge  ENT: -ear pain, -nasal congestion, -sore throat  Cardiovascular: -chest pain, -palpitations, -lower extremity edema  Respiratory: -cough, -shortness of breath  Gastrointestinal: -abdominal pain, -nausea, -vomiting, -diarrhea, -constipation, -blood in stool  Genitourinary: -dysuria, -hematuria, -frequency  Musculoskeletal: -joint pain, -muscle pain  Skin: -rash, -lesion  Neurological: -headache, -dizziness, -numbness, -tingling  Psychiatric: +anxiety, -depression, -sleep difficulty         Physical Exam    Vitals: BMI: 37. Obese. Blood pressure: 120/[unknown]. Pulse: 72, regular rhythm. Afebrile.  General: No acute distress. Well-developed. Well-nourished.  Eyes: EOMI. Sclerae anicteric.  HENT: Normocephalic. Atraumatic. Nares patent. Moist oral mucosa.  Ears: Bilateral TMs clear. Bilateral EACs clear.  Cardiovascular: Regular rate. Regular rhythm. No murmurs. No rubs. No gallops. Normal S1, S2.  Respiratory: Normal respiratory effort. Clear to auscultation bilaterally. No rales. No rhonchi. No wheezing.  Abdomen: Soft. Non-tender. Non-distended. Normoactive bowel sounds.  Musculoskeletal: No  obvious deformity.  Extremities: No lower extremity edema.  Neurological: Alert & oriented x3. No slurred speech. Normal  gait.  Psychiatric: Normal mood. Normal affect. Good insight. Good judgment.  Skin: Warm. Dry. No rash.         Assessment & Plan    F90.9 Attention-deficit hyperactivity disorder, unspecified type  F41.9 Anxiety disorder, unspecified  E66.9 Obesity, unspecified  Z68.37 Body mass index [BMI] 37.0-37.9, adult    ATTENTION-DEFICIT HYPERACTIVITY DISORDER (ADHD):  - Diagnosed the patient with ADHD.  - Patient expressed interest in medication management.  - Prescribed Adderall 20 mg twice daily, to be used as needed for work or home activities.    ANXIETY DISORDER:  - Evaluated the patient's anxiety levels and found them to be elevated.  - Determined that anxiety is likely due to untreated ADHD.    OBESITY:  - Discussed the patient's current weight and BMI of 37.  - Considered prescribing Zepbound for weight management and explained its mechanisms of action to the patient.  - Will initiate Zepbound therapy once insurance coverage is confirmed.    GENERAL HEALTH MAINTENANCE:  - Established care for 40-year-old with no chronic ongoing medical illnesses.  - Reviewed health maintenance status.  - Follow up in 1 year.            Follow up in about 1 year (around 8/6/2026).    This note was generated with the assistance of ambient listening technology. Verbal consent was obtained by the patient and accompanying visitor(s) for the recording of patient appointment to facilitate this note. I attest to having reviewed and edited the generated note for accuracy, though some syntax or spelling errors may persist. Please contact the author of this note for any clarification.

## 2025-08-10 ENCOUNTER — PATIENT MESSAGE (OUTPATIENT)
Dept: FAMILY MEDICINE | Facility: CLINIC | Age: 40
End: 2025-08-10
Payer: COMMERCIAL

## 2025-08-29 ENCOUNTER — OFFICE VISIT (OUTPATIENT)
Dept: FAMILY MEDICINE | Facility: CLINIC | Age: 40
End: 2025-08-29
Payer: COMMERCIAL

## 2025-08-29 VITALS
HEIGHT: 69 IN | BODY MASS INDEX: 36.8 KG/M2 | WEIGHT: 248.44 LBS | DIASTOLIC BLOOD PRESSURE: 78 MMHG | HEART RATE: 56 BPM | OXYGEN SATURATION: 97 % | SYSTOLIC BLOOD PRESSURE: 130 MMHG

## 2025-08-29 DIAGNOSIS — F90.0 ATTENTION DEFICIT HYPERACTIVITY DISORDER (ADHD), PREDOMINANTLY INATTENTIVE TYPE: Primary | ICD-10-CM

## 2025-08-29 PROCEDURE — 99999 PR PBB SHADOW E&M-EST. PATIENT-LVL III: CPT | Mod: PBBFAC,,, | Performed by: FAMILY MEDICINE

## 2025-08-29 RX ORDER — LISDEXAMFETAMINE DIMESYLATE 50 MG/1
50 CAPSULE ORAL EVERY MORNING
Qty: 30 CAPSULE | Refills: 0 | Status: SHIPPED | OUTPATIENT
Start: 2025-08-29